# Patient Record
Sex: FEMALE | Race: ASIAN | NOT HISPANIC OR LATINO | Employment: UNEMPLOYED | URBAN - METROPOLITAN AREA
[De-identification: names, ages, dates, MRNs, and addresses within clinical notes are randomized per-mention and may not be internally consistent; named-entity substitution may affect disease eponyms.]

---

## 2017-03-20 ENCOUNTER — OFFICE VISIT (OUTPATIENT)
Dept: FAMILY MEDICINE | Facility: CLINIC | Age: 32
End: 2017-03-20

## 2017-03-20 ENCOUNTER — ALLIED HEALTH/NURSE VISIT (OUTPATIENT)
Dept: FAMILY MEDICINE | Facility: CLINIC | Age: 32
End: 2017-03-20

## 2017-03-20 VITALS
DIASTOLIC BLOOD PRESSURE: 73 MMHG | HEART RATE: 108 BPM | HEIGHT: 59 IN | SYSTOLIC BLOOD PRESSURE: 107 MMHG | BODY MASS INDEX: 28.51 KG/M2 | WEIGHT: 141.4 LBS

## 2017-03-20 DIAGNOSIS — Z72.0 TOBACCO ABUSE: ICD-10-CM

## 2017-03-20 DIAGNOSIS — Z60.3 LANGUAGE BARRIER, CULTURAL DIFFERENCES: ICD-10-CM

## 2017-03-20 DIAGNOSIS — O09.92 SUPERVISION OF HIGH RISK PREGNANCY IN SECOND TRIMESTER: ICD-10-CM

## 2017-03-20 DIAGNOSIS — N76.0 BACTERIAL VAGINOSIS: ICD-10-CM

## 2017-03-20 DIAGNOSIS — O09.893 SUPERVISION OF OTHER HIGH RISK PREGNANCIES, THIRD TRIMESTER: Primary | ICD-10-CM

## 2017-03-20 DIAGNOSIS — O09.32 LATE PRENATAL CARE AFFECTING PREGNANCY IN SECOND TRIMESTER: Primary | ICD-10-CM

## 2017-03-20 DIAGNOSIS — B96.89 BACTERIAL VAGINOSIS: ICD-10-CM

## 2017-03-20 LAB
BACTERIA: NORMAL
BILIRUBIN UR: NEGATIVE
BLOOD UR: NEGATIVE
CLUE CELLS: NORMAL
GLUCOSE URINE: NEGATIVE
HBV SURFACE AG SERPL QL IA: NEGATIVE
HEMOGLOBIN: 12.4 G/DL (ref 11.7–15.7)
HIV 1+2 AB+HIV1 P24 AG SERPL QL IA: NEGATIVE
KETONES UR QL: NEGATIVE
LEUKOCYTE ESTERASE UR: ABNORMAL
MOTILE TRICHOMONAS: NEGATIVE
NITRITE UR QL STRIP: NEGATIVE
ODOR: NORMAL
PH UR STRIP: 7.5 [PH] (ref 5–7)
PH WET PREP: NORMAL
PLATELET # BLD AUTO: 243 K/UL (ref 150–450)
PROTEIN UR: NEGATIVE
SP GR UR STRIP: 1.01
UROBILINOGEN UR STRIP-ACNC: ABNORMAL
WBC WET PREP: NORMAL
YEAST: NORMAL

## 2017-03-20 RX ORDER — PRENATAL VIT/IRON FUM/FOLIC AC 27MG-0.8MG
1 TABLET ORAL DAILY
Qty: 100 TABLET | Refills: 3 | Status: SHIPPED | OUTPATIENT
Start: 2017-03-20 | End: 2018-05-31

## 2017-03-20 RX ORDER — ACETAMINOPHEN 325 MG/1
650 TABLET ORAL EVERY 4 HOURS PRN
Qty: 100 TABLET | Refills: 0 | Status: SHIPPED | OUTPATIENT
Start: 2017-03-20 | End: 2019-05-10

## 2017-03-20 SDOH — SOCIAL STABILITY - SOCIAL INSECURITY: ACCULTURATION DIFFICULTY: Z60.3

## 2017-03-20 NOTE — MR AVS SNAPSHOT
After Visit Summary   3/20/2017    Sampson Fonseca    MRN: [id number]           Patient Information     Date Of Birth          1985        Visit Information        Provider Department      3/20/2017 2:30 PM Corine Ibarra MD [facility]        Today's Diagnoses     Supervision of other high risk pregnancies, third trimester    -  1    Tobacco abuse          Care Instructions    For pregnancy:  1. Take prenatal vitamin everyday  2. Schedule US for fetal survey and confirm dates  3. Okay to use tylenol as needed for pain    For tobacco abuse:  1. Schedule lifestyle clinic visit    Follow up with Dr. Ibarra in 2 weeks for OB check  We will do glucose screening test at your next visit        Follow-ups after your visit        Additional Services     MENTAL HEALTH REFERRAL       Your provider has referred you to: Lifestyle clinic with Dr. Brown for help quitting chewing tobacco and betelnut    All scheduling is subject to the client's specific insurance plan & benefits, provider/location availability, and provider clinical specialities.  Please arrive 15 minutes early for your first appointment and bring your completed paperwork.    Please be aware that coverage of these services is subject to the terms and limitations of your health insurance plan.  Call member services at your health plan with any benefit or coverage questions.                  Future tests that were ordered for you today     Open Future Orders        Priority Expected Expires Ordered    US OB 14 +WKS SINGLE OR FIRST GESTATION Routine  3/20/2018 3/20/2017            Who to contact     Please call your clinic at [phone] to:    Ask questions about your health    Make or cancel appointments    Discuss your medicines    Learn about your test results    Speak to your doctor   If you have compliments or concerns about an experience at your clinic, or if you wish to file a complaint, please contact [facility]  "Physicians Patient Relations at [phone] or email us at [email or web address]         Additional Information About Your Visit        eZWay Information     [employer]Way is an electronic gateway that provides easy, online access to your medical records. With eZWay, you can request a clinic appointment, read your test results, renew a prescription or communicate with your care team.     To sign up for e[employer]Way visit the website at [email or web address]   You will be asked to enter the access code listed below, as well as some personal information. Please follow the directions to create your username and password.     Your access code is: [email or web address]  Expires: 2017  3:18 PM     Your access code will  in 90 days. If you need help or a new code, please contact your [facility] or call [phone] for assistance.        Care EveryWhere ID     This is your Care EveryWhere ID. This could be used by other organizations to access your [place] medical records  [id number]        Your Vitals Were     Pulse Height Last Period BMI (Body Mass Index)          108 4' 11\" (149.9 cm) 2016 (Exact Date) 28.56 kg/m2         Blood Pressure from Last 3 Encounters:   17 107/73    Weight from Last 3 Encounters:   17 141 lb 6.4 oz (64.1 kg)              We Performed the Following     ABO/Rh Type-HML ([facility])     Antibody Screen ([facility])     Chlamydia/Gono Amplified ([facility])     Culture Urine ([facility])     GYN Cytology ([facility])     Hemoglobin (HGB) ([facility])     Hepatitis B Surface Ab ([facility])     Hepatitis B Surface Ag ([facility])     HIV Ag/Ab Screen [place] ([facility])     Lead, Blood ([facility])     MENTAL HEALTH REFERRAL     Platelets ([facility])     Rubella  IgG ([facility])     Syphilis Screen [place] ([facility])     Urinalysis([facility])     Abdelrahman Zoster Imm Status Aleena ([facility])          Today's Medication Changes          These " changes are accurate as of: 3/20/17  3:18 PM.  If you have any questions, ask your nurse or doctor.               Start taking these medicines.        Dose/Directions    acetaminophen 325 MG tablet   Commonly known as:  TYLENOL   Used for:  Supervision of other high risk pregnancies, third trimester   Started by:  Corine Ibarra MD        Dose:  650 mg   Take 2 tablets (650 mg) by mouth every 4 hours as needed for mild pain   Quantity:  100 tablet   Refills:  0       prenatal multivitamin  plus iron 27-0.8 MG Tabs per tablet   Used for:  Supervision of other high risk pregnancies, third trimester   Started by:  Corine Ibarra MD        Dose:  1 tablet   Take 1 tablet by mouth daily   Quantity:  100 tablet   Refills:  3            Where to get your medicines      These medications were sent to [facility] - [place], [place] - [address], [place] [address]     Phone:  [phone]     acetaminophen 325 MG tablet    prenatal multivitamin  plus iron 27-0.8 MG Tabs per tablet                Primary Care Provider Office Phone # Fax #    Corine Ibarra [phone]       [address] [address]        Thank you!     Thank you for choosing [facility]  for your care. Our goal is always to provide you with excellent care. Hearing back from our patients is one way we can continue to improve our services. Please take a few minutes to complete the written survey that you may receive in the mail after your visit with us. Thank you!             Your Updated Medication List - Protect others around you: Learn how to safely use, store and throw away your medicines at www.disposemymeds.org.          This list is accurate as of: 3/20/17  3:18 PM.  Always use your most recent med list.                   Brand Name Dispense Instructions for use    acetaminophen 325 MG tablet    TYLENOL    100 tablet    Take 2 tablets  (650 mg) by mouth every 4 hours as needed for mild pain       prenatal multivitamin  plus iron 27-0.8 MG Tabs per tablet     100 tablet    Take 1 tablet by mouth daily

## 2017-03-20 NOTE — PATIENT INSTRUCTIONS
For pregnancy:  1. Take prenatal vitamin everyday  2. Schedule US for fetal survey and confirm dates  3. Okay to use tylenol as needed for pain    For tobacco abuse:  1. Schedule lifestyle clinic visit    Follow up with Dr. Ibarra in 2 weeks for OB check  We will do glucose screening test at your next visit    LIFESTYLE REFERRAL:  Date:  Wednesday April 12, 2017  Time:  1:30 PM with Dr. Brown for 1 hour.    OB Ultrasound referral:  [facility]  [address] [place] [address]  [phone]  Date:  Friday March 31, 2017  Time:  1:00 PM  Please have a full bladder by drinking at least 32 ounces of water and refrain from using the bathroom 1 hour prior to appointment.   Sanjiv Bravo has been requested. ([facility])  Given to Sanjiv Shah  3/23/17    Referral Tracking: Lifestyle Clinic  Patient met with Dr. Brown on 5/26/17  Completed 5/26/17  Madeline Shah  6/7/17

## 2017-03-20 NOTE — PROGRESS NOTES
"  First Obstetric Visit       HPI       Sampson Fonseca is a 31 year old woman who presents for an initial prenatal visit at 26w1d weeks of pregnancy with DUNCAN of 2017 by LMP of Patient's last menstrual period was 2016 (exact date)..      She has not had bleeding since her LMP.   She has had mild nausea. This has improved significantly.  Weight loss has not occurred.    This was a planned pregnancy.  She had been on nexplanon, so periods were not regular. The nexplanon came out at the beginning of September of last year.    OTHER CONCERNS: No concerns.    Moved here from [place] in January. This will be her first delivery in the .  She moved here with her family 3 years ago.            Labor Risk Assessment     Is the patient's age <18 or >40?     No  Patint's BMI is Body mass index is 28.56 kg/(m^2).   Does patient have a BMI < 18.5?     No  Prior delivery within 6 months?      No  Ever delivered prior to 37 weeks gestation?  No  Pregnancy occur via In Vitro Fertilization?   No  Are you carrying twins?       No    The patient has the following additional risk factors for  labor:   none     Labor Risk Summary:  Pt is high risk for  Labor  No             Past Medical History     Past Medical History   Diagnosis Date     NO ACTIVE PROBLEMS      See nurse history note, reviewed in detail.             Current Medications      Current Outpatient Prescriptions   Medication Sig Dispense Refill     Prenatal Vit-Fe Fumarate-FA (PRENATAL MULTIVITAMIN  PLUS IRON) 27-0.8 MG TABS per tablet Take 1 tablet by mouth daily 100 tablet 3     acetaminophen (TYLENOL) 325 MG tablet Take 2 tablets (650 mg) by mouth every 4 hours as needed for mild pain 100 tablet 0           Review of Systems      12 point ROS is negative, other than mentioned above.  ====================================================           Physical Exam      /73  Pulse 108  Ht 4' 11\" (149.9 cm)  Wt 141 lb 6.4 oz (64.1 kg)  " LMP 09/18/2016 (Exact Date)  BMI 28.56 kg/m2  GENERAL: healthy, alert and no distress  EYES: Eyes grossly normal to inspection, extraocular movements - intact, and PERRL  HENT: ear canals- normal; TMs- normal; Nose- normal; Mouth- no ulcers, no lesions  NECK: no tenderness, no adenopathy, no asymmetry, no masses, no stiffness; thyroid- normal to palpation  RESP: lungs clear to auscultation - no rales, no rhonchi, no wheezes  BREAST: no masses, no tenderness, no nipple discharge, no palpable axillary masses or adenopathy  CV: regular rates and rhythm, normal S1 S2, no S3 or S4 and no murmur, no click or rub -  ABDOMEN: soft, no tenderness, no  hepatosplenomegaly, no masses, normal bowel sounds. , Fundal height 26.  MS: extremities- no gross deformities noted, no edema  SKIN: no suspicious lesions, no rashes  GENITALIA:  BUS WNL, no lesions noted   VAGINA:  Pink, normal rugae and discharge white  CERVIX:  smooth, without discharge or CMT and parous os,   firm/ closed  UTERUS: Anteverted, nontender 26 weeks in size.  ADNEXA: Without masses or tenderness    Past labs reviewed:  None available      =========================================         Assessment and Plan     Diagnoses and all orders for this visit:    Supervision of other high risk pregnancies, third trimester.  New OB.  Recently moved here from [place], patient has not had prenatal care yet this pregnancy.  Based on her LMP she is approximately 26 weeks and 1 day.  Her fundal height is consistent with this date.  She has not had any ultrasounds since being pregnant.  We'll plan to obtain fetal survey as well as confirm dates as soon as possible.  She will be due for her glucose challenge test at follow-up visit.  Labs obtained today.  Pap smear performed today.  -     Lead, Blood ([facility])  -     Platelets ([facility])  -     Prenatal Vit-Fe Fumarate-FA (PRENATAL MULTIVITAMIN  PLUS IRON) 27-0.8 MG TABS per tablet; Take 1 tablet by mouth daily  -      acetaminophen (TYLENOL) 325 MG tablet; Take 2 tablets (650 mg) by mouth every 4 hours as needed for mild pain  -     ABO/Rh Type-HML ([facility])  -     Antibody Screen ([facility])  -     Urinalysis([facility])  -     Hemoglobin (HGB) ([facility])  -     Hepatitis B Surface Ag ([facility])  -     Syphilis Screen [place] ([facility])  -     HIV Ag/Ab Screen [place] ([facility])  -     Culture Urine ([facility])  -     Rubella  IgG ([facility])  -     Cancel: Chlamydia/Gono Amplified ([facility])  -     Hepatitis B Surface Ab ([facility])  -     Abdelrahman Zoster Imm Status Aleena ([facility])  -     US OB 14 +WKS SINGLE OR FIRST GESTATION; Future  -     GYN Cytology ([facility])  -     Wet Prep ([facility])  -     Chlamydia/Gono Amplified ([facility])  -     Glucose Challenge  1 Hr  ([facility]); Future    Tobacco abuse  -     MENTAL HEALTH REFERRAL ([facility])    Bacterial vaginosis. Wet prep with clue cells   - 1 week PO metronidazole sent to pharmacy, will have  call patient.    31 year old  , 26w1d weeks of pregnancy with DUNCAN of 2017 by LMP of Patient's last menstrual period was 2016 (exact date)..      Pregnancy Risk Assessment: High Risk pregnancy  Discussed high risk conditions as follows: Late prenatal care, non-English speaking    -Dating US obtained and dating confirmed?   NO (Recommended)  -Taking PNV/Folate?     NO (Recommended)      - Ordered new OB labs: blood type and antibody screen, HIV, VDRL, hep B, rubella, UA/UC, gonorrhea/chlamydia, Pap smear done today.  - Discussed genetic screening. Patient is not in correct [place] for this.  - The patient does not h/o severe, early preeclampsia with delivery <34weeks, preeclampsia in more than one pregnancy, pre-gestational diabetes, chronic hypertension, renal disease, or autoimmune disease so WILL NOT start low dose aspirin (81mg) and calcium supplementation (1g-1.5g/day) to prevent preeclampsia.  - Prenatal vitamins ordered.    Counseling given:    - Follow up in 2 weeks for return OB visit.  - Recommended weight gain for pregnancy: 25-35 lbs.     - Instructed on best evidence for: healthy diet and foods to avoid; exercise and activity during pregnancy; avoiding exposure to toxoplasmosis; safe use of seatbelts during pregnancy; and maintenance of a generally healthy lifestyle  -Patient to see OB educator/ RN today and/or next visit.    Discussed the harms, benefits, side effects and alternative therapies for current prescribed and OTC medications.       Options for treatment and follow-up care were reviewed with the patient and/or guardian. Sampson D Mo and/or guardian engaged in the decision making process and verbalized understanding of the options discussed and agreed with the final plan.    Staffed with Dr. Soliman.    Corine Ibarra MD ([place]) PGY-2  [facility]  3/21/2017

## 2017-03-20 NOTE — PROGRESS NOTES
Past Medical History     Do you have a history of any of the following medical conditions?    Condition No/Yes/Details   Hypertension No    Heart disease, mitral valve prolapse, rheumatic fever No    Asthma or another chronic lung disease No    An autoimmune disorder No    Kidney disease No    Frequent urinary tract infections No    Epilepsy, seizures, or spells No    Migraine headaches No    Stroke, loss of sensation/function, seizures, or other neuro problem No    Diabetes No    Thyroid problems or have you taken thyroid medication No    Hepatitis, liver disease, jaundice No    Blood clots, phlebitis, pulmonary embolism or varicose veins No    Excessive bleeding after surgery or dental work No    Do you have more bleeding than other women after a cut or a scratch? No    Anemia No    Blood transfusions No         Would you refuse a blood transfusion?       No   If yes, then ask next question. If no, skip next question.   Would you rather die than receive a blood transfusion? No    Breast problems No    Have you ever ?  YES plans to breastfeed   Abnormalities of the uterus No    Abnormal pap smear No    Have you ever been treated for depression? No    Are you having problems with crying spells or loss of self-esteem? No    Have you ever required psychiatric care? No    Have you been physically, sexually, or emotionally hurt by someone? No    Have you been in a major accident or suffered serious trauma? No    Have you ever had any gynecological surgical procedures such as cervical conization, LEEP, laser treatment, cryosurgery of the cervix, or a dilatation and curettage? No    Have you had any other surgical procedures? No         Have you ever had any complications from anesthesia? No    Have you ever been hospitalized for a nonsurgical reason? No             Substance use and exposure     Does anyone in your home smoke? No    Do you use tobacco products or betel nut?  YES chews betel nut and  tobacco up to 10 times per day and interested in quitting   Do you drink beer, wine, or hard liquor? No    Do you use any of the following: marijuana, speed, cocaine, heroin, hallucinogens, or other drugs? No               Symptoms since Last Menstrual Period     Do you currently have any of the following symptoms: No/Yes/Details        Abdominal pain No         Blood in the stools or urine No         Chest pain No         Shortness of breath No         coughing or vomiting up blood No         Your heart racing or skipping beats No         Nausea or vomiting  YES once a month feels like heart is beating faster on and off for 1 day.  Drinks lemon juice and it goes away for the last year.        Pain on urination  YES burning with urination 2 x's per month in the last 2 months         Vaginal discharge or bleeding No                Genetic Screening          Is the patient 35 years or older? No      Do you have a history of any of the following No/Yes/Details        A metabolic disorder (e.g. Insulin-dependent DM, PKU) No         Recurrent pregnancy loss or still birth No      Do you, the baby's father, or anyone in your families have          Thalassemia AND MCV <80 No         Hemophilia No         Neural tube defect No }        Congenital heart defect No         Sickle cell disease or trait No         Muscular dystrophy No         Cystic fibrosis No         Mental retardation or autism No         Down's syndrome No         Ladarius-Sach's disease No         Oakley's chorea No         Any other inherited genetic or chromosomal disorder No         A child with birth defects not listed above No                Infection History     Ever treated for tuberculosis or had a positive skin test No    Ever had genital herpes (or has your partner) No    Had a rash or viral illness since LMP No    Ever had a sexually transmitted infection No    Ever had chicken pox or the vaccine  YES had chicken pox as a child   Have you  had a sexual partner who is HIV positive No    Ever had any other serious infectious disease No                Risk Assessment     Average Risk Category  No significant risk factors: Yes    At Risk Category (up to 3)  Teen pregnancy: No  Poor social situation: No  Domestic abuse: No  Financial difficulties: No  Smoker: Yes- chewing tobacco and betel nut  H/O  deliver: No  H/O drug abuse: No  Non-English speaking: Yes  Advanced maternal age: No  GDM risks: No  Previous C/S: No  H/O PIH: No  H/O STIs: No  H/O mental health concerns: No  Onset care > 20 weeks: Yes      High Risk Category (4 or more At Risk or)  Diabetes/GDM: No  Multiple gestation: No  Chronic hypertension: No  Significant hx of asthma: No  Fetal demise > 20 weeks: No  Positive tox screen: No  Current mental health treatment: No      Risk: High Risk   Date determined: 3/20/17

## 2017-03-20 NOTE — MR AVS SNAPSHOT
After Visit Summary   3/20/2017    Sampson Fonseca    MRN: [id number]           Patient Information     Date Of Birth          1985        Visit Information        Provider Department      3/20/2017 1:40 PM Educator, Brown [facility]        Today's Diagnoses     Late prenatal care affecting pregnancy in second trimester    -  1    Supervision of high risk pregnancy in second trimester        Language barrier, cultural differences           Follow-ups after your visit        Your next 10 appointments already scheduled     Apr 06, 2017  1:30 PM CDT   RETURN OB with Corine Ibarra MD   [facility] ([facility])    [address] [place] [address]   [phone]            Apr 12, 2017  1:30 PM CDT   Return Lifestyle with Marsha Brown PhD   [facility] ([facility])    [address] [place] [address]   [phone]            Apr 26, 2017  1:30 PM CDT   Return Visit with Marsha Brown, PhD   [facility] ([facility])    [address] [place] [address]   [phone]              Future tests that were ordered for you today     Open Future Orders        Priority Expected Expires Ordered    Glucose Challenge  1 Hr  ([facility]) Routine 4/3/2017 6/20/2017 3/20/2017     OB 14 +WKS SINGLE OR FIRST GESTATION Routine  3/20/2018 3/20/2017            Who to contact     Please call your clinic at [phone] to:    Ask questions about your health    Make or cancel appointments    Discuss your medicines    Learn about your test results    Speak to your doctor   If you have compliments or concerns about an experience at your clinic, or if you wish to file a complaint, please contact [facility] Patient Relations at [phone] or email us at [email or web address]         Additional Information About Your Visit        MyChart Information     Rylat is an electronic gateway that provides easy, online access to your medical  records. With Nettle, you can request a clinic appointment, read your test results, renew a prescription or communicate with your care team.     To sign up for Nettle visit the website at [email or web address]   You will be asked to enter the access code listed below, as well as some personal information. Please follow the directions to create your username and password.     Your access code is: [email or web address]  Expires: 2017  3:18 PM     Your access code will  in 90 days. If you need help or a new code, please contact your [facility] or call [phone] for assistance.        Care EveryWhere ID     This is your Care EveryWhere ID. This could be used by other organizations to access your [facility] medical records  [id number]        Your Vitals Were     Last Period                   2016 (Exact Date)            Blood Pressure from Last 3 Encounters:   17 107/73    Weight from Last 3 Encounters:   17 141 lb 6.4 oz (64.1 kg)              We Performed the Following     AT RISK ANTEPARTUM MANAGEMENT     CARE COORDINATION          Today's Medication Changes          These changes are accurate as of: 3/20/17 11:59 PM.  If you have any questions, ask your nurse or doctor.               Start taking these medicines.        Dose/Directions    acetaminophen 325 MG tablet   Commonly known as:  TYLENOL   Used for:  Supervision of other high risk pregnancies, third trimester   Started by:  Corine Ibarra MD        Dose:  650 mg   Take 2 tablets (650 mg) by mouth every 4 hours as needed for mild pain   Quantity:  100 tablet   Refills:  0       metroNIDAZOLE 500 MG tablet   Commonly known as:  FLAGYL   Used for:  Bacterial vaginosis   Started by:  Corine Ibarra MD        Dose:  500 mg   Take 1 tablet (500 mg) by mouth 2 times daily   Quantity:  14 tablet   Refills:  0       prenatal multivitamin  plus iron 27-0.8 MG Tabs per tablet   Used  for:  Supervision of other high risk pregnancies, third trimester   Started by:  Corine Ibarra MD        Dose:  1 tablet   Take 1 tablet by mouth daily   Quantity:  100 tablet   Refills:  3            Where to get your medicines      These medications were sent to [facility] - [place], [place] - [address], [place] [address]     Phone:  [phone]     acetaminophen 325 MG tablet    metroNIDAZOLE 500 MG tablet    prenatal multivitamin  plus iron 27-0.8 MG Tabs per tablet                Primary Care Provider Office Phone # Fax #    Corine Ibarra [phone]       [facility] [address] [facility] [address]        Thank you!     Thank you for choosing [facility]  for your care. Our goal is always to provide you with excellent care. Hearing back from our patients is one way we can continue to improve our services. Please take a few minutes to complete the written survey that you may receive in the mail after your visit with us. Thank you!             Your Updated Medication List - Protect others around you: Learn how to safely use, store and throw away your medicines at www.disposemymeds.org.          This list is accurate as of: 3/20/17 11:59 PM.  Always use your most recent med list.                   Brand Name Dispense Instructions for use    acetaminophen 325 MG tablet    TYLENOL    100 tablet    Take 2 tablets (650 mg) by mouth every 4 hours as needed for mild pain       metroNIDAZOLE 500 MG tablet    FLAGYL    14 tablet    Take 1 tablet (500 mg) by mouth 2 times daily       prenatal multivitamin  plus iron 27-0.8 MG Tabs per tablet     100 tablet    Take 1 tablet by mouth daily

## 2017-03-21 PROBLEM — Z60.3 LANGUAGE BARRIER, CULTURAL DIFFERENCES: Status: ACTIVE | Noted: 2017-03-20

## 2017-03-21 PROBLEM — O09.32 LATE PRENATAL CARE AFFECTING PREGNANCY IN SECOND TRIMESTER: Status: ACTIVE | Noted: 2017-03-20

## 2017-03-21 PROBLEM — O09.92 SUPERVISION OF HIGH RISK PREGNANCY IN SECOND TRIMESTER: Status: ACTIVE | Noted: 2017-03-20

## 2017-03-21 LAB
ABO + RH BLD: NORMAL
BLD GP AB SCN SERPL QL: NEGATIVE
C TRACH RRNA SPEC QL NAA+PROBE: NEGATIVE
CULTURE: NORMAL
HBV SURFACE AB SER-ACNC: NEGATIVE M[IU]/ML
N GONORRHOEA RRNA SPEC QL NAA+PROBE: NEGATIVE
REPEAT ABO/RH TYPING (HML): NORMAL
RPR SER QL: NORMAL
RUBV IGG SERPL QL IA: NORMAL

## 2017-03-21 RX ORDER — METRONIDAZOLE 500 MG/1
500 TABLET ORAL 2 TIMES DAILY
Qty: 14 TABLET | Refills: 0 | Status: SHIPPED | OUTPATIENT
Start: 2017-03-21 | End: 2017-05-01

## 2017-03-21 NOTE — NURSING NOTE
Family Medicine OB Education    I provided the following OB education to Sampson Fonseca.    Discussed that Dr Ibarra should be the doctor that she sees for her prenatal visits and that Dr Ibarra will try hard to be the one to deliver her baby.  Discussed that if Dr Ibarra was unavailable that one of our other physicians would deliver the baby and that this could be a male or female provider.  Briefly discussed residency program and that multiple doctors would be present at time of delivery.  Sheet given and discussed fetal growth and development.  Sheet given and discussed warning signs with reasons to call clinic, L&D, or 24 hr HE  line with questions or concerns (phone numbers given).  Sheet given and discussed Tdap to be given after 27 weeks gestation and the importance of family members get immunized before delivery.  Proof of pregnancy completed and given to pt.  Gave pt preregistration form for [facility] to complete.  See questionnaire and pregnancy risk assessment for further information in provider encounter.       Name of provider who requested the OB education: Dr Ibarra  Name of provider on site at the time of performing the OB education: Dr Janny Mccollum, RN BSN

## 2017-03-21 NOTE — PROGRESS NOTES
Preceptor attestation:  Patient seen and discussed with the resident. Assessment and plan reviewed with resident and agreed upon.  Supervising physician: Gregg Soliman  [facility]

## 2017-03-22 PROBLEM — Z23 NEED FOR VARICELLA VACCINE: Status: ACTIVE | Noted: 2017-03-20

## 2017-03-22 LAB
COLLECTION METHOD: NORMAL
LEAD BLD-MCNC: <1.9 UG/DL
VZV IGG SER QL IF: ABNORMAL

## 2017-03-24 ENCOUNTER — RECORDS - HEALTHEAST (OUTPATIENT)
Dept: ADMINISTRATIVE | Facility: OTHER | Age: 32
End: 2017-03-24

## 2017-03-24 LAB
HIGH RISK?: NO
HPV REFLEX?: NORMAL
INTERPRETATION: NORMAL
INTERPRETER: NORMAL
LAB AP ABNORMAL BLEEDING: NO
LAB AP BIRTH CONTROL/HORMONES: NORMAL
LAB AP CASE REPORT: NORMAL
LAB AP CERVICAL APPEARANCE: NORMAL
LAB AP GYN INTERPRETATION: NORMAL
LAB AP MALIGNANT?: NORMAL
LAB AP PATIENT STATUS: NORMAL
LAB AP PREVIOUS ABNL: NORMAL
LAB AP PREVIOUS NORMAL: NORMAL
LAST MENS PERIOD: NORMAL
SPECIMEN ADEQUACY:: NORMAL

## 2017-04-03 DIAGNOSIS — O09.893 SUPERVISION OF OTHER HIGH RISK PREGNANCIES, THIRD TRIMESTER: ICD-10-CM

## 2017-04-06 ENCOUNTER — OFFICE VISIT (OUTPATIENT)
Dept: FAMILY MEDICINE | Facility: CLINIC | Age: 32
End: 2017-04-06

## 2017-04-06 VITALS
HEIGHT: 59 IN | OXYGEN SATURATION: 100 % | BODY MASS INDEX: 29.43 KG/M2 | SYSTOLIC BLOOD PRESSURE: 107 MMHG | DIASTOLIC BLOOD PRESSURE: 70 MMHG | TEMPERATURE: 98.6 F | WEIGHT: 146 LBS | HEART RATE: 100 BPM

## 2017-04-06 DIAGNOSIS — L98.9 SCALP LESION: ICD-10-CM

## 2017-04-06 DIAGNOSIS — O09.893 SUPERVISION OF OTHER HIGH RISK PREGNANCIES, THIRD TRIMESTER: Primary | ICD-10-CM

## 2017-04-06 DIAGNOSIS — M54.50 PREGNANCY WITH LOW BACK PAIN IN THIRD TRIMESTER, ANTEPARTUM: ICD-10-CM

## 2017-04-06 DIAGNOSIS — O26.893 PREGNANCY WITH LOW BACK PAIN IN THIRD TRIMESTER, ANTEPARTUM: ICD-10-CM

## 2017-04-06 DIAGNOSIS — R30.0 DYSURIA: ICD-10-CM

## 2017-04-06 DIAGNOSIS — Z23 NEED FOR VACCINATION: ICD-10-CM

## 2017-04-06 LAB
BILIRUBIN UR: NEGATIVE
BLOOD UR: NEGATIVE
GLU GEST SCREEN 1HR 50G: 101 (ref 0–129)
GLUCOSE URINE: NEGATIVE
KETONES UR QL: NEGATIVE
LEUKOCYTE ESTERASE UR: NEGATIVE
NITRITE UR QL STRIP: NEGATIVE
PH UR STRIP: 6.5 [PH] (ref 5–7)
PROTEIN UR: NEGATIVE
SP GR UR STRIP: 1.01
UROBILINOGEN UR STRIP-ACNC: NORMAL

## 2017-04-06 NOTE — PATIENT INSTRUCTIONS
For bumps for head:  1. Use warm packs or warm rags to help with pain and possible drainage    For pain:  1. Use tylenol 2 tablets as needed for pain  2. Use warm or cold packs to help with pain  3. We will check to make sure you don't have a urinary tract infection    Follow up with Dr. Ibarra in 2 weeks

## 2017-04-06 NOTE — MR AVS SNAPSHOT
After Visit Summary   4/6/2017    Sampson Fonseca    MRN: [id number]           Patient Information     Date Of Birth          1985        Visit Information        Provider Department      4/6/2017 1:30 PM Corine Ibarra MD [facility]        Today's Diagnoses     Supervision of other high risk pregnancies, third trimester    -  1    Dysuria          Care Instructions    For bumps for head:  1. Use warm packs or warm rags to help with pain and possible drainage    For pain:  1. Use tylenol 2 tablets as needed for pain  2. Use warm or cold packs to help with pain  3. We will check to make sure you don't have a urinary tract infection    Follow up with Dr. Ibarra in 2 weeks        Follow-ups after your visit        Your next 10 appointments already scheduled     Apr 12, 2017  1:30 PM CDT   Return Lifestyle with Marsha Brown PhD   [facility] ([facility])    [address] [place] [id number] [phone]            Apr 26, 2017  1:30 PM CDT   Return Visit with Marsha Brown PhD   [facility] ([facility])    [address] [place] [address]   [phone]              Who to contact     Please call your clinic at [phone] to:    Ask questions about your health    Make or cancel appointments    Discuss your medicines    Learn about your test results    Speak to your doctor   If you have compliments or concerns about an experience at your clinic, or if you wish to file a complaint, please contact [facility] Patient Relations at [phone] or email us at [email or web address]         Additional Information About Your Visit        Socialarehart Information     HigherNext is an electronic gateway that provides easy, online access to your medical records. With HigherNext, you can request a clinic appointment, read your test results, renew a prescription or communicate with your care team.     To sign up for HigherNext visit the website at  "[email or web address]   You will be asked to enter the access code listed below, as well as some personal information. Please follow the directions to create your username and password.     Your access code is: [email or web address]  Expires: 2017  3:18 PM     Your access code will  in 90 days. If you need help or a new code, please contact your [facility] or call [phone] for assistance.        Care EveryWhere ID     This is your Care EveryWhere ID. This could be used by other organizations to access your [facility] medical records  [id number]        Your Vitals Were     Pulse Temperature Height Last Period Pulse Oximetry BMI (Body Mass Index)    100 98.6  F (37  C) (Oral) 4' 10.75\" (149.2 cm) 2016 (Exact Date) 100% 29.74 kg/m2       Blood Pressure from Last 3 Encounters:   17 107/70   17 107/73    Weight from Last 3 Encounters:   17 146 lb (66.2 kg)   17 141 lb 6.4 oz (64.1 kg)              We Performed the Following     Urinalysis([facility])     Urine Culture ([facility])        Primary Care Provider Office Phone # Fax #    Corine Alivia Ibarra [phone]       [address] [address]        Thank you!     Thank you for choosing [facility]  for your care. Our goal is always to provide you with excellent care. Hearing back from our patients is one way we can continue to improve our services. Please take a few minutes to complete the written survey that you may receive in the mail after your visit with us. Thank you!             Your Updated Medication List - Protect others around you: Learn how to safely use, store and throw away your medicines at www.disposemymeds.org.          This list is accurate as of: 17  2:11 PM.  Always use your most recent med list.                   Brand Name Dispense Instructions for use    acetaminophen 325 MG tablet    TYLENOL    100 tablet    Take 2 " tablets (650 mg) by mouth every 4 hours as needed for mild pain       metroNIDAZOLE 500 MG tablet    FLAGYL    14 tablet    Take 1 tablet (500 mg) by mouth 2 times daily       prenatal multivitamin  plus iron 27-0.8 MG Tabs per tablet     100 tablet    Take 1 tablet by mouth daily

## 2017-04-06 NOTE — PROGRESS NOTES
"Subjective  Concerns: She states that things are going well.    About 4-5 days ago, she started having pain in the right side of her low back.  Pain is worse at night, when laying down.  When she is awake she does not have any problems.  Tylenol has not been helpful.  She has not tried warm or ice packs.      She does endorse dysuria.  This started 2 days ago.  She denies any blood in the urine.  Denies fevers or chills.  She has had UTIs in the past, last .      She has a bump on her scalp, and she has had this in the past.  She had to have these drained in the past.  There has not been drainage from this recently.    Headache absent Changes in vision absent Chest pain  absent Dyspnea absent Nausea absent without vomiting. Abdominal pain absent. Contractions absent  Dysuria present, see above. Vaginal Discharge absent Vaginal bleeding absent.  Loss of Fluid absent   Extremity swelling absent.   Fetal movement present.    STD risk screening   Have you used alcohol or illicit drugs?No    Do you have multiple sex partners? No    Have you had a sexually transmitted disease?No    Have you had syphilis?No    Have you had a sexual partner who is HIV positive?No    Have you travelled during the pregnancy?No  Have your sexual partner(s) travelled during the pregnancy?No      Patient Active Problem List   Diagnosis     Supervision of high risk pregnancy in second trimester     Late prenatal care affecting pregnancy in second trimester     Language barrier, cultural differences     Need for varicella vaccine       Sampson Fonseca speaks Omani so an  was used today.    Guidance:  seatbelt use  fetal growth and movement  signs of  labor    Objective  /70  Pulse 100  Temp 98.6  F (37  C) (Oral)  Ht 4' 10.75\" (149.2 cm)  Wt 146 lb (66.2 kg)  LMP 2016 (Exact Date)  SpO2 100%  BMI 29.74 kg/m2  No distress.  Gravid abdomen.  .  Fundal height 28 cm.  no edema.  Scalp reveals tender, 0.5 cm " firm nodule overlying the right parietal bone, near sagittal suture; no evidence of infection--erythema, warmth, drainage.  There is tenderness over right paraspinal muscles and pain over the greater trochanter.  Mild right CVA tenderness.    Results  Blood type: B POS  Results for orders placed or performed in visit on 03/20/17   Lead, Blood ([facility])   Result Value Ref Range    Lead <1.9 <5.0 ug/dL    Collection Method Venous     Narrative    Test performed by:  [facility] [address] [place] [address]   Platelets ([facility])   Result Value Ref Range    Platelets 243.0 150.0 - 450.0 K/uL   ABO/Rh Type-HML ([facility])   Result Value Ref Range    ABO/Rh(D) B POS     Repeat ABO/Rh Typing ([facility]) B POS     Narrative    Test performed by:  [facility] [address], [place] [address]   Antibody Screen ([facility])   Result Value Ref Range    Antibody Screen Negative Negative    Narrative    Test performed by:  [facility] [address], [place] [address]   Urinalysis([facility])   Result Value Ref Range    Specific Gravity Urine 1.015 1.005 - 1.030    pH Urine 7.5 4.5 - 8.0    Leukocyte Esterase UR Trace (A) NEGATIVE    Nitrite Urine Negative NEGATIVE    Protein UR Negative NEGATIVE    Glucose Urine Negative NEGATIVE    Ketones Urine Negative NEGATIVE    Urobilinogen mg/dL 0.2 E.U./dL 0.2 E.U./dL    Bilirubin UR Negative NEGATIVE    Blood UR Negative NEGATIVE   Hemoglobin (HGB) ([facility])   Result Value Ref Range    Hemoglobin 12.4 11.7 - 15.7 g/dL   Hepatitis B Surface Ag ([facility])   Result Value Ref Range    Hepatitis B Surface Antigen Negative Negative    Narrative    Test performed by:  [facility] [address] [place] [address]   Syphilis Screen Bison ([facility])   Result Value Ref Range    Syphilis Screen Cascade Non-Reactive Non-Reactive    Narrative    Test performed by:  [facility] [address] [place] [address]   HIV Ag/Ab Screen [place]  ([facility])   Result Value Ref Range    HIV Antigen/Antibody Negative Negative    Narrative    Test performed by:  [facility] [address] [place] [address]   Culture Urine ([facility])   Result Value Ref Range    Culture SEE RESULTS BELOW     Narrative    Test performed by:  [facility] [address] [place] [address]   Rubella  IgG ([facility])   Result Value Ref Range    Rubella IgG Immune Immune    Narrative    Test performed by:  [facility] [address] [place] [address]   Hepatitis B Surface Ab ([facility])   Result Value Ref Range    Hepatitis B Surface Antibody Negative Negative    Narrative    Test performed by:  [facility] [address] [place] [address]   Abdelrahman Zoster Imm Status Aleena ([facility])   Result Value Ref Range    V.zoster Immune Status Equivocal (A) Immune    Narrative    Test performed by:  [facility] [address] [place] [address]   GYN Cytology ([facility])   Result Value Ref Range    Lab AP Case Report SEE RESULTS BELOW     Lab AP Gyn Interpretation SEE RESULTS BELOW     Lab AP Malignant? Normal Normal    Specimen adequacy:       Satisfactory for evaluation, endocervical/transformation zone component present    HPV Reflex? Yes regardless of result     High Risk? No     Last Mens Period 9/2016     Lab AP Abnormal Bleeding No     Lab AP Patient Status pregnant     Lab AP Birth Control/Hormones None     Lab AP Previous Normal unsure     Lab AP Previous Abnl never     Lab AP Cervical Appearance normal     Narrative    Test performed by:  [facility] [address] [place] [address]   Wet Prep ([facility])   Result Value Ref Range    Yeast Wet Prep None none    Motile Trichomonas Wet Prep Negative Negative    Clue Cells Wet Prep Present <20% NONE    WBC WET PREP 5-10 2 - 5    Bacteria Wet Prep Many None    pH Wet Prep Not performed 3.8 - 4.5    Odor Wet Prep None NONE    Chlam[facility]Gono [facility]ified [facility]   Result Value Ref Range    Chlamydia trac,Amplified Prb Negative Negative    N gonorrhoeae,Amplified Prb Negative Negative    Narrative    Test performed by:  [facility] [address] [place] [address]   HPV Bledsoe PCR ([facility])   Result Value Ref Range    Interpretation No HPV Type(s) Detected No HPV Type(s) Detected, No High Risk HP     Deniz Matos MD, [facility]     Narrative    Test performed by:  [facility] [address] [place] [address]  No HPV Type(s) Detected  Interpretation: The sample is negative for the presence of DNA from one or   more of the following high risk HPV types (16, 18, 31, 33, 35, 39, 45, 51, 52,   56, 58, 66, and 68) and/or probable high or low risk HPV types (2a, 6, 11,   26, 30, 32, 34, 42, 43, 44, 53, 54, 55, 57, 61, 62, 67, 69, 70, 71, 72, 73,   74, 77, 81, 82, 83, 84, 85, 87, 89).  High risk types are classified by the   IARC as carcinogenicity, probably, or possible carcinogenic to humans.    According to the IARC, low risk types are not classifiable as to their   carcingenicity to humans. These results do not exclude the possibility of   additional low or high risk HPV types not detected due to adequacy and   representativeness of sampling or assay sensitivity.  Comments: The absence of low and or high risk HPV types reduces the collective   risk for the development of cervical dysplasia or neoplasia.  This result, in   association with the morphology assessment of the Pap sample are vallejo   information for the determination of follow-up testing and in the clinical   management of the patient.  Methodology:  Genomic DNA was extracted from the submitted specimen and   amplified by the polymerase chain reaction (PCR) using consensus   oligonucleotide primers for the L1 region of the human papillomavirus (HPV)   genome.  Concurrently, the integrity of the extracted DNA was evaluated  by the   amplification of beta-globin, a common housekeeping gene.  Result   interpretation is performed by analysis of peak height and size data generated   through fluorescence detection by automated electrophoresis.  HPV DNA   positive PCR products were subjected to digestion by the restriction   endonucleases Hae III, Pst 1, and Rsa 1.  Digested DNA fragments were    by automated electrophoresis.  Digital electropherograms and gel   images of data were generated and the specific HPV type was determined by   matching the restriction fragment patterns of the respective specimens to that   of known HPV restriction fragment patterns.  The analytical and performance   characteristics of this laboratory-developed test (LDT) were determined by   [facility] pursuant to Clinical Laboratory Improvement Amendments (CLIA 88)   requirements.  It has not been cleared or approved by the U.S. Food and Drug   Administration (FDA).  The FDA has determined that such clearance or approval   is not a requirement prior to use for clinical purposes.       Assessment & Plan  31 year old  at 28w4d with DUNCAN 2017 based on LMP and second trimester US.    Sampson was seen today for prenatal care and other.    Diagnoses and all orders for this visit:    Supervision of other high risk pregnancies, third trimester  Patient is doing well today, has a few concerns (see below).  BP normal, FHT reassuring, and size consistent with dates. Due for 1 hour glucose test today, and it is normal.  Non-immune to hep B and varicella.  Will start Hep B series today, plan for varicella post partum.  Also due for Tdap today.  US showed normal survey, except unable to visualize posterior fossa.  Will need repeat at the end of the month  -     Glucose Challenge  1 Hr  ([facility])  - Tdap  - Hep B  - Follow up fetal survey for posterior fossa in 3 weeks.    Pregnancy with low back pain in third trimester, antepartum  Patient complaining of low  back pain at night when she sleeps, not alleviated by tylenol.  Exam consistent with MSK pain.   - Tylenol   - Warm packs and ice packs.    Dysuria  Burning with urination for 2 days.  UA negative, but will await urine culture.  -     Urinalysis([facility])  -     Urine Culture ([facility])    Scalp lesion  Solitary scalp nodule on right upper parietal area with no evidence of infection.   - Warm packs    Need for vaccination  -     ADMIN VACCINE, INITIAL  -     ADMIN VACCINE, EACH ADDITIONAL  -     HEPATITIS B VACCINE,ADULT,IM  -     TDAP VACCINE ([facility])      Return to clinic in 2 weeks.    Corine Ibarra PGY-2  I precepted today with Mabel James MD.

## 2017-04-07 LAB — CULTURE: NORMAL

## 2017-04-07 NOTE — PROGRESS NOTES
"Preceptor attestation:  Vital signs reviewed: /70  Pulse 100  Temp 98.6  F (37  C) (Oral)  Ht 4' 10.75\" (149.2 cm)  Wt 146 lb (66.2 kg)  LMP 09/18/2016 (Exact Date)  SpO2 100%  BMI 29.74 kg/m2    Patient seen and discussed with the resident. Assessment and plan reviewed with resident and agreed upon.    Supervising physician: Mabel James MD  [facility]  "

## 2017-04-12 ENCOUNTER — OFFICE VISIT (OUTPATIENT)
Dept: PSYCHOLOGY | Facility: CLINIC | Age: 32
End: 2017-04-12

## 2017-04-12 DIAGNOSIS — Z72.0 TOBACCO ABUSE: Primary | ICD-10-CM

## 2017-04-12 PROBLEM — O99.330 TOBACCO USE IN PREGNANCY: Status: ACTIVE | Noted: 2017-04-12

## 2017-04-12 NOTE — Clinical Note
Dr. Ibarra and Desiree CHEN. She's reducing to 4x/day for now, with ultimate goal of totally stopping.

## 2017-04-12 NOTE — PATIENT INSTRUCTIONS
- Use betel nut+tobacco 4 times per day,  Use gum instead on other two times.    - Follow-up with Dr. Brown in 2-3 weeks.

## 2017-04-12 NOTE — PROGRESS NOTES
Health & Behavior Assessment    Visit type: initial  Length of visit:  35  Others present:  (PASQUALE Branch, Vietnamese)    Subjective: Sampson Fonseca is a 31 year old, Vietnamese female who was referred for behavioral health assessment and treatment by Dr. Ibarra to help with the psychosocial aspects of reducing tobacco use during pregnancy.    Patient Goals: Quitting tobacco+betel nut through reducing gradually over the first couple weeks. She would like to stop use completely.    Motivations: Wants to quit for the health of her baby. Has heard that tobacco use during pregnancy can contribute to reduced birth weight.    History of problem/perception of problem: Has been using betel nut+ tobacco 6 times per day recently. She has used for the past 3-4 years. Initially she would only use 1-2 times per day, and use increased gradually over time. Triggers for wanting to use more includes: finishing meals and spending time with friends.     Previous strategies used for change: Has never tried to quit.      Barriers to change: Rates confidence as 3/3. Cannot think of any barriers. Her  and father (who lives nearby) both use tobacco+betel nut.    Strengths: High reported motivation and confidence. Supportive family. Able to generate ideas for change (gradually reducing, substituting with gum).    Medical conditions:   Patient Active Problem List   Diagnosis     Supervision of high risk pregnancy in second trimester     Late prenatal care affecting pregnancy in second trimester     Language barrier, cultural differences     Need for varicella vaccine     Tobacco abuse     Psychiatric conditions: Denied having any current or past anxiety, depression, or other mental health problems.    Substance use history: Does not use alcohol or other drugs.    Sleep: Sleep is typically good, but worsens during pregnancy typically, as it has in this pregnancy as well. She sleeps about 7-8 hours per night, but has trouble staying  "asleep and wakes throughout the night.     Other relevant history: Lives with her  and their 3 children.     Objective: Ms. Fonseca is awake and alert for today's visit.        Importance level: 3/3   Confidence level: 3/3      Assessment: Ms. Fonseca is a Afghan female who was [facility] to [facility] for assistance with quitting tobacco use. She appeared to be active and engaged in today's meet and appeared receptive to goal-setting and feedback. Mood was reportedly \"good\" and affect was euthymic. Insight and judgement appears to be good. She has no past experience trying to quit tobacco, so provided education about urges and cravings and how to manage these. She had reasonable and creative ideas for change, which I support. Made a concrete plan for change and plan to meet again in about 2 weeks to continue reducing use.     Stage of change: PREPARATION (Decided to change - considering how)   Diagnosis: Tobacco Abuse    Plan:  1.  Described integrated care team and shared chart with primary care provider.  2.  Made the plan for Sampson to reduce tobacco+betel nut use gradually. She would like to start by reducing to only 4 times/day for the next 2 weeks. Will replace with gum.   3. Discussed distracting her mind from urges or cravings, as they are temporary. Also described picturing the craving as a wave, and she can imagine surfing over that wave.  "

## 2017-04-12 NOTE — MR AVS SNAPSHOT
After Visit Summary   4/12/2017    Sampson Fonseca    MRN: [id number]           Patient Information     Date Of Birth          1985        Visit Information        Provider Department      4/12/2017 1:30 PM Marsha Brown, PhD [facility]        Care Instructions    - Use betel nut+tobacco 4 times per day,  Use gum instead on other two times.    - Follow-up with Dr. Brown in 2-3 weeks.        Follow-ups after your visit        Your next 10 appointments already scheduled     Apr 17, 2017  8:20 AM CDT   RETURN OB with Corine Ibarra MD   [facility] ([facility])    [address] [place] [address]   [phone]            Apr 26, 2017  1:30 PM CDT   Return Visit with Marsha Brown PhD   [facility] ([facility])    [address] [place] [address]   [phone]            May 01, 2017  8:20 AM CDT   RETURN OB with Corine Ibarra MD   [facility] ([facility])    [address] [place] [address]   [phone]              Who to contact     Please call your clinic at [phone] to:    Ask questions about your health    Make or cancel appointments    Discuss your medicines    Learn about your test results    Speak to your doctor   If you have compliments or concerns about an experience at your clinic, or if you wish to file a complaint, please contact [facility] Patient Relations at [phone] or email us at [email or web address]         Additional Information About Your Visit        MyChart Information     [employer] is an electronic gateway that provides easy, online access to your medical records. With [employer], you can request a clinic appointment, read your test results, renew a prescription or communicate with your care team.     To sign up for [employer] visit the website at [email or web address]   You will be asked to enter the access code listed below, as well as some personal information.  Please follow the directions to create your username and password.     Your access code is: [email or web address]  Expires: 2017  3:18 PM     Your access code will  in 90 days. If you need help or a new code, please contact your [facility] or call [phone] for assistance.        Care EveryWhere ID     This is your Care EveryWhere ID. This could be used by other organizations to access your [facility] medical records  [id number]        Your Vitals Were     Last Period                   2016 (Exact Date)            Blood Pressure from Last 3 Encounters:   17 107/70   17 107/73    Weight from Last 3 Encounters:   17 146 lb (66.2 kg)   17 141 lb 6.4 oz (64.1 kg)              Today, you had the following     No orders found for display       Primary Care Provider Office Phone # Fax #    Corine Alivia Ibarra [phone]       [place] [address]        Thank you!     Thank you for choosing [facility]  for your care. Our goal is always to provide you with excellent care. Hearing back from our patients is one way we can continue to improve our services. Please take a few minutes to complete the written survey that you may receive in the mail after your visit with us. Thank you!             Your Updated Medication List - Protect others around you: Learn how to safely use, store and throw away your medicines at www.disposemymeds.org.          This list is accurate as of: 17  1:47 PM.  Always use your most recent med list.                   Brand Name Dispense Instructions for use    acetaminophen 325 MG tablet    TYLENOL    100 tablet    Take 2 tablets (650 mg) by mouth every 4 hours as needed for mild pain       metroNIDAZOLE 500 MG tablet    FLAGYL    14 tablet    Take 1 tablet (500 mg) by mouth 2 times daily       prenatal multivitamin  plus iron 27-0.8 MG Tabs per tablet     100 tablet     Take 1 tablet by mouth daily

## 2017-04-13 NOTE — PROGRESS NOTES
I have reviewed and agree with the behavioral health fellow's documentation for this visit.  I did not personally see the patient.  Marie Sweet, PhD., LP

## 2017-04-17 ENCOUNTER — OFFICE VISIT (OUTPATIENT)
Dept: FAMILY MEDICINE | Facility: CLINIC | Age: 32
End: 2017-04-17

## 2017-04-17 VITALS
TEMPERATURE: 98 F | DIASTOLIC BLOOD PRESSURE: 67 MMHG | BODY MASS INDEX: 29.31 KG/M2 | HEART RATE: 94 BPM | HEIGHT: 59 IN | SYSTOLIC BLOOD PRESSURE: 100 MMHG | WEIGHT: 145.4 LBS

## 2017-04-17 DIAGNOSIS — J30.2 SEASONAL ALLERGIC RHINITIS, UNSPECIFIED ALLERGIC RHINITIS TRIGGER: ICD-10-CM

## 2017-04-17 DIAGNOSIS — O09.893 SUPERVISION OF OTHER HIGH RISK PREGNANCIES, THIRD TRIMESTER: Primary | ICD-10-CM

## 2017-04-17 RX ORDER — LORATADINE 10 MG/1
10 TABLET ORAL DAILY
Qty: 30 TABLET | Refills: 0 | Status: SHIPPED | OUTPATIENT
Start: 2017-04-17 | End: 2017-05-01

## 2017-04-17 NOTE — PATIENT INSTRUCTIONS
For allergies:  1. Take loratidine 10 mg every day    For pregnancy;  1. Schedule ultrasound to look at baby's brain again after 4/28/17    Follow up with Dr. Ibarra in 2 weeks    OB ULTRASOUND REFERRAL:  [facility]  Radiology Department  [address] [place] [address]  [phone]  Date:  Monday May 1, 2017  Time:  9:15 AM  Please have a full bladder by drinking at least 24 ounces of water and refrain from using the bathroom 1 hour prior to appointment.  Jose Manuel  has been requested.  Given to Sanjiv Bravo.  Madeline SAENZ Pack  4/19/17

## 2017-04-17 NOTE — MR AVS SNAPSHOT
After Visit Summary   4/17/2017    Sampson Fonseca    MRN: [id number]           Patient Information     Date Of Birth          1985        Visit Information        Provider Department      4/17/2017 8:20 AM Corine Ibarra MD [facility]        Today's Diagnoses     Seasonal allergic rhinitis, unspecified allergic rhinitis trigger    -  1    Supervision of other high risk pregnancies, third trimester          Care Instructions    For allergies:  1. Take loratidine 10 mg every day    For pregnancy;  1. Schedule ultrasound to look at baby's brain again after 4/28/17    Follow up with Dr. Ibarra in 2 weeks        Follow-ups after your visit        Your next 10 appointments already scheduled     Apr 26, 2017  1:30 PM CDT   Return Visit with Marsha Brown, PhD   [facility] ([facility])    [address] [place] [id number] [phone]            May 01, 2017  8:20 AM CDT   RETURN OB with Corine Ibarra MD   [facility] ([facility])    [address] [place] [id number] [phone]              Future tests that were ordered for you today     Open Future Orders        Priority Expected Expires Ordered    US OB LIMITED FOLLOW UP/REPEAT Routine 7/16/2017 4/17/2018 4/17/2017            Who to contact     Please call your clinic at [phone] to:    Ask questions about your health    Make or cancel appointments    Discuss your medicines    Learn about your test results    Speak to your doctor   If you have compliments or concerns about an experience at your clinic, or if you wish to file a complaint, please contact [facility] Patient Relations at [phone] or email us at [email or web address]         Additional Information About Your Visit        MyChart Information     [employer] is an electronic gateway that provides easy, online access to your medical records. With [employer], you can request a clinic appointment, read  "your test results, renew a prescription or communicate with your care team.     To sign up for [employer] visit the website at [email or web address]   You will be asked to enter the access code listed below, as well as some personal information. Please follow the directions to create your username and password.     Your access code is: [email or web address]  Expires: 2017  3:18 PM     Your access code will  in 90 days. If you need help or a new code, please contact your [facility] or call [phone] for assistance.        Care EveryWhere ID     This is your Care EveryWhere ID. This could be used by other organizations to access your [facility] medical records  [id number]        Your Vitals Were     Pulse Temperature Height Last Period BMI (Body Mass Index)       94 98  F (36.7  C) (Oral) 4' 11.25\" (150.5 cm) 2016 (Exact Date) 29.12 kg/m2        Blood Pressure from Last 3 Encounters:   17 100/67   17 107/70   17 107/73    Weight from Last 3 Encounters:   17 145 lb 6.4 oz (66 kg)   17 146 lb (66.2 kg)   17 141 lb 6.4 oz (64.1 kg)                 Today's Medication Changes          These changes are accurate as of: 17  9:00 AM.  If you have any questions, ask your nurse or doctor.               Start taking these medicines.        Dose/Directions    loratadine 10 MG tablet   Commonly known as:  CLARITIN   Used for:  Seasonal allergic rhinitis, unspecified allergic rhinitis trigger   Started by:  Corine Ibarra MD        Dose:  10 mg   Take 1 tablet (10 mg) by mouth daily   Quantity:  30 tablet   Refills:  0            Where to get your medicines      These medications were sent to [facility] - [place], [place] - [address], [place] [address]     Phone:  [phone]     loratadine 10 MG tablet                Primary Care Provider Office Phone # Fax #    Corine Ibarra MD " [phone]       [address] [address]        Thank you!     Thank you for choosing [facility]  for your care. Our goal is always to provide you with excellent care. Hearing back from our patients is one way we can continue to improve our services. Please take a few minutes to complete the written survey that you may receive in the mail after your visit with us. Thank you!             Your Updated Medication List - Protect others around you: Learn how to safely use, store and throw away your medicines at www.disposemymeds.org.          This list is accurate as of: 4/17/17  9:00 AM.  Always use your most recent med list.                   Brand Name Dispense Instructions for use    acetaminophen 325 MG tablet    TYLENOL    100 tablet    Take 2 tablets (650 mg) by mouth every 4 hours as needed for mild pain       loratadine 10 MG tablet    CLARITIN    30 tablet    Take 1 tablet (10 mg) by mouth daily       metroNIDAZOLE 500 MG tablet    FLAGYL    14 tablet    Take 1 tablet (500 mg) by mouth 2 times daily       prenatal multivitamin  plus iron 27-0.8 MG Tabs per tablet     100 tablet    Take 1 tablet by mouth daily

## 2017-04-17 NOTE — PROGRESS NOTES
Preceptor attestation:  Patient seen and discussed with the resident. Assessment and plan reviewed with resident and agreed upon.  Supervising physician: Mitul Trimble  [facility]

## 2017-04-17 NOTE — PROGRESS NOTES
"Subjective  Concerns: She has had a runny nose for the last 2 days.  Things seem to be staying about the same. No fever. She has also had a cough--non-productive.  No ill contacts. She states that her eyes are itchy all the time.  She has tried tylenol with little relief.  Has never been told she has allergies.    She is trying to cut back on betel nut--now chewing 5 times per day, down from 6 times per day.     Headache absent  Changes in vision absent  Chest pain: chest tightness with cough  Dyspnea absent  Nausea absent without vomiting.  Abdominal pain absent.  Contractions present, 3-4 times per day, [place]  Dysuria absent  Vaginal Discharge absent  Vaginal bleeding absent.  Loss of Fluid absent   Extremity swelling absent.   Fetal movement present.    STD risk screening   Have you used alcohol or illicit drugs?No    Do you have multiple sex partners? No    Have you had a sexually transmitted disease?No    Have you had syphilis?No    Have you had a sexual partner who is HIV positive?No    Patient Active Problem List   Diagnosis     Supervision of high risk pregnancy in second trimester     Late prenatal care affecting pregnancy in second trimester     Language barrier, cultural differences     Need for varicella vaccine     Tobacco abuse       Sampson Fonseca speaks Romanian so an  was used today.    Guidance:  seatbelt use  fetal growth and movement  signs of  labor    Objective  /67  Pulse 94  Temp 98  F (36.7  C) (Oral)  Ht 4' 11.25\" (150.5 cm)  Wt 145 lb 6.4 oz (66 kg)  LMP 2016 (Exact Date)  BMI 29.12 kg/m2  No distress.  Nasal turbinates pale and boggy bilaterally. Gravid abdomen.  .  Fundal height 31 cm.  no edema.    Results  Blood type: B POS  Results for orders placed or performed in visit on 17   Urinalysis([facility])   Result Value Ref Range    Specific Gravity Urine 1.010 1.005 - 1.030    pH Urine 6.5 4.5 - 8.0    Leukocyte Esterase UR Negative NEGATIVE "    Nitrite Urine Negative NEGATIVE    Protein UR Negative NEGATIVE    Glucose Urine Negative NEGATIVE    Ketones Urine Negative NEGATIVE    Urobilinogen mg/dL 0.2 E.U./dL 0.2 E.U./dL    Bilirubin UR Negative NEGATIVE    Blood UR Negative NEGATIVE   Urine Culture ([facility])   Result Value Ref Range    Culture SEE RESULTS BELOW     Narrative    Test performed by:  [facility]  [address] [place] [address]   Glucose Challenge  1 Hr  ([facility])   Result Value Ref Range    Glu Gest Screen 1hr 50g 101 0 - 129       Assessment & Plan  31 year old  at 30w1d with DUNCAN 2017 based on LMP and second trimester US.    Sampson was seen today for prenatal care.    Diagnoses and all orders for this visit:    Supervision of other high risk pregnancies, third trimester  Patient is doing well today, no pregnancy related concerns.  FHT reassuring, fundal height consistent with dates.  BP normal today.  High risk because of language barrier and late onset PNC.  Fetal survey normal, and confirmed dates.  Unable to visualize posterior fossa due to baby's position.  -     US OB LIMITED FOLLOW UP/REPEAT; follow up posterior fossa    Seasonal allergic rhinitis, unspecified allergic rhinitis trigger  Patient with runny nose, itchy eyes, and and cough starting this week.  Peak allergy season. Nasal turbinates are boggy and pale, consistent with allergic rhinitis.  -     loratadine (CLARITIN) 10 MG tablet; Take 1 tablet (10 mg) by mouth daily      Return to clinic in 2 weeks.    Corine Ibarra PGY-2  I precepted today with Mitul Trimble MD.

## 2017-04-26 ENCOUNTER — OFFICE VISIT (OUTPATIENT)
Dept: PSYCHOLOGY | Facility: CLINIC | Age: 32
End: 2017-04-26

## 2017-04-26 DIAGNOSIS — Z72.0 TOBACCO ABUSE: Primary | ICD-10-CM

## 2017-04-26 NOTE — PROGRESS NOTES
"[facility] Follow Up Note    Length of visit: 30 minutes  Others present:  (PASQUALE Branch Burmese)  Number of visits post initial assessment: 1    Identifying Information and Presenting Problem:  The patient is a 31 year old  female who was referred to [facility] by Dr. Ibarra for help with reducing tobacco use during pregnancy.    Visit Summary:  -Sampson has reduced tobacco+betel nut use from 6x/day to 4-5x/day. Our goal from last visit was to reduce to 4x/day. Congratulated her on this success. She said that she was successful with this goal because she committed herself to achieving it and reminded herself of her main motivation for reducing, her baby's health, whenever she felt cravings (usually after eating).     -Sampson was ambivalent about reducing more today. Used Motivational Interviewing approach to explore ambivalence. She feels unable to totally quit because of the cravings, its constant availability in the home, and because she worries she would be \"bored\" if she never used. Cravings last about 5-10 minutes, and then go away. Discussed reducing boredom to help her with this change. She enjoys watching her children read and play, so we discussed doing that when she is starting to feel either cravings or boredom. At this point it does not seem feasible to remove tobacco from the home because other family members use it and are not willing to quit. Will discuss at upcoming visit strategies to limit access.    -After this discussion she was willing to reduce to 3-4 times per day. She felt 2/3 (somewhat) confident she can make this change and said she will try her best.      Objective: Pt appears awake and alert for today's visit.       Diagnosis: Tobacco Abuse    Plan:  1. Follow-up in 2 weeks.  2. Reduce tobacco use to 3-4x/day  3. Plan to explore if family would be willing to limit her access to tobacco to support her behavior change.      "

## 2017-05-01 ENCOUNTER — OFFICE VISIT (OUTPATIENT)
Dept: FAMILY MEDICINE | Facility: CLINIC | Age: 32
End: 2017-05-01

## 2017-05-01 ENCOUNTER — RECORDS - HEALTHEAST (OUTPATIENT)
Dept: ADMINISTRATIVE | Facility: OTHER | Age: 32
End: 2017-05-01

## 2017-05-01 VITALS
HEIGHT: 59 IN | TEMPERATURE: 98 F | DIASTOLIC BLOOD PRESSURE: 71 MMHG | SYSTOLIC BLOOD PRESSURE: 107 MMHG | BODY MASS INDEX: 30.04 KG/M2 | HEART RATE: 98 BPM | WEIGHT: 149 LBS

## 2017-05-01 DIAGNOSIS — Z72.0 TOBACCO ABUSE: ICD-10-CM

## 2017-05-01 DIAGNOSIS — O09.93 SUPERVISION OF HIGH RISK PREGNANCY IN THIRD TRIMESTER: Primary | ICD-10-CM

## 2017-05-01 DIAGNOSIS — N89.8 VAGINAL DISCHARGE: ICD-10-CM

## 2017-05-01 PROBLEM — O09.92 SUPERVISION OF HIGH RISK PREGNANCY IN SECOND TRIMESTER: Status: RESOLVED | Noted: 2017-03-20 | Resolved: 2017-05-01

## 2017-05-01 LAB
BACTERIA: NORMAL
CLUE CELLS: NORMAL
HEMOGLOBIN: 11.7 G/DL (ref 11.7–15.7)
MOTILE TRICHOMONAS: NEGATIVE
ODOR: NORMAL
PH WET PREP: NORMAL
WBC WET PREP: NORMAL
YEAST: NORMAL

## 2017-05-01 NOTE — PROGRESS NOTES
"Subjective  Concerns: No concerns today, feeling well.     She stopped the allergy pill already, because    She is down to chewing betel nut 3-4 times per day.  It is very difficult for her, because her family chews it frequently.  She has been going to lifestyle clinic with Dr. Brown and finds this helpful.    OB Review of Systems  Headache only once in a while, uses Tylenol with good relief  Changes in vision absent   Chest pain  absent   Dyspnea absent   Nausea absent without vomiting.   Abdominal pain absent.   Contractions absent    Dysuria absent   Vaginal Discharge present, white discharge  Vaginal bleeding absent.    Loss of Fluid absent     Extremity swelling absent.     Fetal movement absent.    STD risk screening     Have you used alcohol or illicit drugs?No    Do you have multiple sex partners? No    Have you had a sexually transmitted disease?No    Have you had syphilis?No    Have you had a sexual partner who is HIV positive?No    Have you travelled during the pregnancy?No  Have your sexual partner(s) travelled during the pregnancy?No     Patient Active Problem List   Diagnosis     Late prenatal care affecting pregnancy in second trimester     Language barrier, cultural differences     Need for varicella vaccine     Tobacco abuse     Supervision of high risk pregnancy in third trimester       Sampson MAIN Fonseca speaks Singaporean so an  was used today.    Guidance:  Triage phone number  signs of  labor    Objective  /71  Pulse 98  Temp 98  F (36.7  C) (Oral)  Ht 4' 11\" (149.9 cm)  Wt 149 lb (67.6 kg)  LMP 2016 (Exact Date)  Breastfeeding? No  BMI 30.09 kg/m2  No distress.  Gravid abdomen.  .  Fundal height 32 cm.  no edema.   appears to have physiologic discharge    Results  Blood type: B POS  Results for orders placed or performed in visit on 17   Urinalysis([facility])   Result Value Ref Range    Specific Gravity Urine 1.010 1.005 - 1.030    pH Urine 6.5 4.5 - 8.0    " Leukocyte Esterase UR Negative NEGATIVE    Nitrite Urine Negative NEGATIVE    Protein UR Negative NEGATIVE    Glucose Urine Negative NEGATIVE    Ketones Urine Negative NEGATIVE    Urobilinogen mg/dL 0.2 E.U./dL 0.2 E.U./dL    Bilirubin UR Negative NEGATIVE    Blood UR Negative NEGATIVE   Urine Culture ([facility])   Result Value Ref Range    Culture SEE RESULTS BELOW     Narrative    Test performed by:  [facility]  [address] [place] [address]   Glucose Challenge  1 Hr  ([facility])   Result Value Ref Range    Glu Gest Screen 1hr 50g 101 0 - 129       Assessment & Plan  31 year old  at 32w1d with DUNCAN 2017 based on LMP    Diagnoses and all orders for this visit:    Supervision of high risk pregnancy in third trimester  Patient doing well today, no concerns.  High risk for language barrier, tobacco use during pregnancy, and late onset prenatal care.   During ROS, does note some increased vaginal discharge--appears physiologic, but will check wet prep. FHT reassuring, FH consistent with dates. BP normal.  She had normal GCT last week.  Due for hemoglobin and RPR today (did not have at last visit due to late onset PNC).  Plan for GBS at next visit or the following.    -     Hemoglobin (HGB) ([facility])  -     Syphilis Screen [place] (RPR/VDRL) ([facility])  -  Wet prep    Tobacco abuse  She has been going to lifestyle clinic with Dr. Brown to work on tobacco cessation, as she chews Betel nut.  She has cut back to 3-4 times per day, which was her identified goal at her last visit with Dr. Brown.   - Continue lifestyle clinic    Unsure total weight gain, as patient does not know how much she weighed before getting pregnant, but weight gain velocity has been appropriate since she established at our clinic.    Return to clinic in 2 weeks.    Corine Ibarra PGY-2  I precepted today with Gregg Soliman MD.

## 2017-05-01 NOTE — PROGRESS NOTES
Preceptor attestation:  Patient seen and discussed with the resident. Assessment and plan reviewed with resident and agreed upon.  Supervising physician: Gregg Soliman  [facility]

## 2017-05-01 NOTE — PATIENT INSTRUCTIONS
[facility]   Phone number: [phone]    [facility]  Phone number: [phone]  Address: [address]     Follow up with Dr. Ibarra in 2 weeks

## 2017-05-01 NOTE — MR AVS SNAPSHOT
After Visit Summary   5/1/2017    Sampson Fonseca    MRN: [id number]           Patient Information     Date Of Birth          1985        Visit Information        Provider Department      5/1/2017 8:20 AM Corine Ibarra MD [facility]        Today's Diagnoses     Supervision of high risk pregnancy in third trimester    -  1    Tobacco abuse        Vaginal discharge          Care Instructions    [facility]   Phone number: [phone]    [facility]  Phone number: [phone]  Address: [address] [place] [address]     Follow up with Dr. Ibarra in 2 weeks        Follow-ups after your visit        Your next 10 appointments already scheduled     May 10, 2017  4:00 PM CDT   Return Visit with Marsha Brown, PhD   [facility] ([facility])    [address] [place] [address] [phone]            May 15, 2017  1:10 PM CDT   RETURN OB with Corine Ibarra MD   [facility] ([facility])    [address] [place] [address] [phone]              Who to contact     Please call your clinic at [phone] to:    Ask questions about your health    Make or cancel appointments    Discuss your medicines    Learn about your test results    Speak to your doctor   If you have compliments or concerns about an experience at your clinic, or if you wish to file a complaint, please contact [facility] Patient Relations at [phone] or email us at [email or web address]         Additional Information About Your Visit        MyChart Information     Wireless Generationt is an electronic gateway that provides easy, online access to your medical records. With TruQC, you can request a clinic appointment, read your test results, renew a prescription or communicate with your care team.     To sign up for Wireless Generationt visit the website at [email or web address]   You will be asked to enter the access code listed below, as well as  "some personal information. Please follow the directions to create your username and password.     Your access code is: [email or web address]  Expires: 2017  3:18 PM     Your access code will  in 90 days. If you need help or a new code, please contact your [facility] or call [phone] for assistance.        Care EveryWhere ID     This is your Care EveryWhere ID. This could be used by other organizations to access your [place] medical records  [id number]        Your Vitals Were     Pulse Temperature Height Last Period Breastfeeding? BMI (Body Mass Index)    98 98  F (36.7  C) (Oral) 4' 11\" (149.9 cm) 2016 (Exact Date) No 30.09 kg/m2       Blood Pressure from Last 3 Encounters:   17 107/71   17 100/67   17 107/70    Weight from Last 3 Encounters:   17 149 lb (67.6 kg)   17 145 lb 6.4 oz (66 kg)   17 146 lb (66.2 kg)              We Performed the Following     Hemoglobin (HGB) ([facility])     Syphilis Screen [place] (RPR/VDRL) ([facility])     Wet Prep ([facility])          Today's Medication Changes          These changes are accurate as of: 17  9:06 AM.  If you have any questions, ask your nurse or doctor.               Stop taking these medicines if you haven't already. Please contact your care team if you have questions.     loratadine 10 MG tablet   Commonly known as:  CLARITIN   Stopped by:  Corine Ibarra MD                    Primary Care Provider Office Phone # Fax #    Corine Ibarra [phone]       [address] [address]        Thank you!     Thank you for choosing [facility]  for your care. Our goal is always to provide you with excellent care. Hearing back from our patients is one way we can continue to improve our services. Please take a few minutes to complete the written survey that you may receive in the mail after your visit with us. Thank you!   "           Your Updated Medication List - Protect others around you: Learn how to safely use, store and throw away your medicines at www.disposemymeds.org.          This list is accurate as of: 5/1/17  9:06 AM.  Always use your most recent med list.                   Brand Name Dispense Instructions for use    acetaminophen 325 MG tablet    TYLENOL    100 tablet    Take 2 tablets (650 mg) by mouth every 4 hours as needed for mild pain       prenatal multivitamin  plus iron 27-0.8 MG Tabs per tablet     100 tablet    Take 1 tablet by mouth daily

## 2017-05-02 LAB — RPR SER QL: NORMAL

## 2017-05-10 ENCOUNTER — OFFICE VISIT (OUTPATIENT)
Dept: PSYCHOLOGY | Facility: CLINIC | Age: 32
End: 2017-05-10

## 2017-05-10 DIAGNOSIS — Z72.0 TOBACCO ABUSE: Primary | ICD-10-CM

## 2017-05-10 NOTE — MR AVS SNAPSHOT
After Visit Summary   5/10/2017    Sampson Fonseca    MRN: [id number]           Patient Information     Date Of Birth          1985        Visit Information        Provider Department      5/10/2017 4:00 PM Marsha Brown, PhD [facility]        Today's Diagnoses     Tobacco abuse    -  1       Follow-ups after your visit        Follow-up notes from your care team     Return in about 2 weeks (around 2017).      Your next 10 appointments already scheduled     May 15, 2017  1:10 PM CDT   RETURN OB with Corine Ibarra MD   [facility] ([facility])    [address] [place] [address]   [phone]            May 26, 2017  8:30 AM CDT   Return Visit with Marsha Brown PhD   [facility] ([facility])    [address] [place] [address]   [phone]              Who to contact     Please call your clinic at [phone] to:    Ask questions about your health    Make or cancel appointments    Discuss your medicines    Learn about your test results    Speak to your doctor   If you have compliments or concerns about an experience at your clinic, or if you wish to file a complaint, please contact [facility] Patient Relations at [phone] or email us at [email or web address]         Additional Information About Your Visit        MyChart Information     Fourth Wall Studiost is an electronic gateway that provides easy, online access to your medical records. With Overture Technologies, you can request a clinic appointment, read your test results, renew a prescription or communicate with your care team.     To sign up for Fourth Wall Studiost visit the website at [email or web address]   You will be asked to enter the access code listed below, as well as some personal information. Please follow the directions to create your username and password.     Your access code is: [email or web address]  Expires: 2017  3:18 PM     Your access code will  in 90 days. If you need  help or a new code, please contact your [facility] or call [phone] for assistance.        Care EveryWhere ID     This is your Care EveryWhere ID. This could be used by other organizations to access your [place] medical records  [id number]        Your Vitals Were     Last Period                   09/18/2016 (Exact Date)            Blood Pressure from Last 3 Encounters:   05/01/17 107/71   04/17/17 100/67   04/06/17 107/70    Weight from Last 3 Encounters:   05/01/17 149 lb (67.6 kg)   04/17/17 145 lb 6.4 oz (66 kg)   04/06/17 146 lb (66.2 kg)              We Performed the Following     Health & Behavior Intervention 15 min (91966, 1 unit)        Primary Care Provider Office Phone # Fax #    Corine Alivia Ibarra [phone]       [place] [address]        Thank you!     Thank you for choosing [facility]  for your care. Our goal is always to provide you with excellent care. Hearing back from our patients is one way we can continue to improve our services. Please take a few minutes to complete the written survey that you may receive in the mail after your visit with us. Thank you!             Your Updated Medication List - Protect others around you: Learn how to safely use, store and throw away your medicines at www.disposemymeds.org.          This list is accurate as of: 5/10/17 11:59 PM.  Always use your most recent med list.                   Brand Name Dispense Instructions for use    acetaminophen 325 MG tablet    TYLENOL    100 tablet    Take 2 tablets (650 mg) by mouth every 4 hours as needed for mild pain       prenatal multivitamin  plus iron 27-0.8 MG Tabs per tablet     100 tablet    Take 1 tablet by mouth daily

## 2017-05-10 NOTE — PROGRESS NOTES
[facility] Follow Up Note    Length of visit: 20 minutes  Others present:  (APSQUALE Branch Burmese)  Number of visits post initial assessment: 2    Identifying Information and Presenting Problem:  The patient is a 31 year old  female who was referred to [facility] by Dr. Ibarra for help with reducing tobacco use during pregnancy.    Visit Summary:  Sampson has maintained use at typically 4x/day, whereas during last visit she was using 4-5x/day, so congratulated her on this continued downtrend in use. Her main trigger is seeing her  and father use around her. She has not asked them to limit use around her or possibly limit her access to tobacco, but was willing to have this conversation with them. She is not sure if they will be willing to make these changes, and will talk with them to see what they think. They have been supportive of her own change, and I encouraged her to share that her main trigger is seeing their use, so that they are aware this is an important area they could support her.    Also discussed how the betel nut combination she uses is a combination of betel nut, lime, and tobacco. Asked if she would be willing to continue using 4x a day, but cut out the tobacco portion of the combination. She was willing to try this strategy.      Objective: Pt appears awake and alert for today's visit.       Diagnosis: Tobacco Abuse    Plan:  1. Follow-up in 2-3 weeks.  2. Continue to use betel nut 4x/day but do not use tobacco.  3. Discuss with family willingness to limit use of betel nut around Sampson.

## 2017-05-15 ENCOUNTER — OFFICE VISIT (OUTPATIENT)
Dept: FAMILY MEDICINE | Facility: CLINIC | Age: 32
End: 2017-05-15

## 2017-05-15 VITALS
HEART RATE: 118 BPM | SYSTOLIC BLOOD PRESSURE: 103 MMHG | TEMPERATURE: 98.6 F | WEIGHT: 150.2 LBS | DIASTOLIC BLOOD PRESSURE: 69 MMHG | BODY MASS INDEX: 30.34 KG/M2

## 2017-05-15 DIAGNOSIS — O09.93 SUPERVISION OF HIGH RISK PREGNANCY IN THIRD TRIMESTER: Primary | ICD-10-CM

## 2017-05-15 NOTE — PROGRESS NOTES
Preceptor attestation:  Patient seen and discussed with the resident. Assessment and plan reviewed with resident and agreed upon.  Supervising physician: Reggie Restrepo  [facility]

## 2017-05-15 NOTE — PROGRESS NOTES
Subjective  Concerns: Doing well today, no concerns.    Seeing Dr. Brown at [facility], back to 4 times per day with betelnut.     Headache occasionally (3 times per week), tylenol helps  Changes in vision absent   Chest pain  absent   Dyspnea absent   Nausea absent without vomiting.  Abdominal pain absent.   Contractions present, having them 3-4 times per day--described as tightening of the belly   Dysuria absent   Vaginal Discharge absent   Vaginal bleeding absent.    Loss of Fluid absent     Extremity swelling mild.     Fetal movement present.    Have you travelled during the pregnancy?No  Have your sexual partner(s) travelled during the pregnancy?No    Patient Active Problem List   Diagnosis     Late prenatal care affecting pregnancy in second trimester     Language barrier, cultural differences     Need for varicella vaccine     Tobacco abuse     Supervision of high risk pregnancy in third trimester       Sampson Fonseca speaks Citizen of the [place] so an  was used today.    Guidance:  birth control  travel  warning signs/PIH    Do you need help getting a car seat? YES  Do you need help getting a breast pump? No    Objective  /69 (BP Location: Left arm, Patient Position: Chair, Cuff Size: Adult Regular)  Pulse 118  Temp 98.6  F (37  C) (Oral)  Wt 150 lb 3.2 oz (68.1 kg)  LMP 09/18/2016 (Exact Date)  BMI 30.34 kg/m2  No distress.  Gravid abdomen.  .  Fundal height 33 cm.  no edema.    Results  Blood type: B POS  Results for orders placed or performed in visit on 05/01/17   Hemoglobin (HGB) ([facility])   Result Value Ref Range    Hemoglobin 11.7 11.7 - 15.7 g/dL   Syphilis Screen [place] (RPR/VDRL) ([facility])   Result Value Ref Range    Syphilis Screen [place] Non-Reactive Non-Reactive    Narrative    Test performed by:  [facility] [address] [place] [address]   Wet Prep ([facility])   Result Value Ref Range    Yeast Wet Prep None none    Motile Trichomonas Wet Prep Negative  Negative    Clue Cells Wet Prep None NONE    WBC WET PREP 2-5 2 - 5    Bacteria Wet Prep Few None    pH Wet Prep Not performed 3.8 - 4.5    Odor Wet Prep None NONE       Assessment & Plan  31 year old  at 34w1d with DUNCAN 2017 based on LMP    Sampson was seen today for prenatal care.    Diagnoses and all orders for this visit:    Supervision of high risk pregnancy in third trimester  Doing well today, no concerns.  High risk due to tobacco use, language barrier, and late prenatal care.  BP normal today. FHT reassuring. Size consistent with dates.  Unable to make it to her follow up US due to schedule conflicts, planning to reschedule this week.  Does not have a car seat and needs help getting one.   -  Needs follow up US for posterior fossa as soon as possible   - Will have OB coordinator help locate car seat   -  GBS to be done next week   - Plan for position and cervical check next week      Weight gain velocity is normal.    Return to clinic in 2 weeks.    Corine Ibarra PGY-2  I precepted today with Reggie Restrepo MD.

## 2017-05-15 NOTE — PATIENT INSTRUCTIONS
Schedule ultrasound as soon as possible    Follow up in 2 weeks with Dr. Ibarra    [facility] Labor and Delivery: [phone]  [address] [facility]: [phone]

## 2017-05-15 NOTE — MR AVS SNAPSHOT
After Visit Summary   5/15/2017    Sampson Fonseca    MRN: [id number]           Patient Information     Date Of Birth          1985        Visit Information        Provider Department      5/15/2017 1:10 PM Corine Ibarra MD [facility]        Today's Diagnoses     Supervision of high risk pregnancy in third trimester    -  1      Care Instructions    Schedule ultrasound as soon as possible    Follow up in 2 weeks with Dr. Ibarra    [facility] Labor and Delivery: [phone]  [address] [facility]: [phone]        Follow-ups after your visit        Your next 10 appointments already scheduled     May 26, 2017  8:30 AM CDT   Return Visit with Marsha Brown PhD   [facility] ([facility])    [address] [facility] [id number] [phone]              Who to contact     Please call your clinic at [phone] to:    Ask questions about your health    Make or cancel appointments    Discuss your medicines    Learn about your test results    Speak to your doctor   If you have compliments or concerns about an experience at your clinic, or if you wish to file a complaint, please contact [facility] Patient Relations at [phone] or email us at [email or web address]         Additional Information About Your Visit        MyChart Information     Starfish Retention Solutionst is an electronic gateway that provides easy, online access to your medical records. With Fermentalg, you can request a clinic appointment, read your test results, renew a prescription or communicate with your care team.     To sign up for Starfish Retention Solutionst visit the website at [email or web address]   You will be asked to enter the access code listed below, as well as some personal information. Please follow the directions to create your username and password.     Your access code is: [email or web address]  Expires: 2017  3:18 PM     Your access code will  in 90 days. If  you need help or a new code, please contact your [facility] or call [phone] for assistance.        Care EveryWhere ID     This is your Care EveryWhere ID. This could be used by other organizations to access your [place] medical records  [id number]        Your Vitals Were     Pulse Temperature Last Period BMI (Body Mass Index)          118 98.6  F (37  C) (Oral) 09/18/2016 (Exact Date) 30.34 kg/m2         Blood Pressure from Last 3 Encounters:   05/15/17 103/69   05/01/17 107/71   04/17/17 100/67    Weight from Last 3 Encounters:   05/15/17 150 lb 3.2 oz (68.1 kg)   05/01/17 149 lb (67.6 kg)   04/17/17 145 lb 6.4 oz (66 kg)              Today, you had the following     No orders found for display       Primary Care Provider Office Phone # Fax #    Corine Alivia Ibarra [phone]       [place] [address]        Thank you!     Thank you for choosing [facility]  for your care. Our goal is always to provide you with excellent care. Hearing back from our patients is one way we can continue to improve our services. Please take a few minutes to complete the written survey that you may receive in the mail after your visit with us. Thank you!             Your Updated Medication List - Protect others around you: Learn how to safely use, store and throw away your medicines at www.disposemymeds.org.          This list is accurate as of: 5/15/17  1:47 PM.  Always use your most recent med list.                   Brand Name Dispense Instructions for use    acetaminophen 325 MG tablet    TYLENOL    100 tablet    Take 2 tablets (650 mg) by mouth every 4 hours as needed for mild pain       prenatal multivitamin  plus iron 27-0.8 MG Tabs per tablet     100 tablet    Take 1 tablet by mouth daily

## 2017-05-15 NOTE — PROGRESS NOTES
Completed online referral for free car seat from [employer].  They will call pt to arrange delivery and education on how to use the car seat./NG

## 2017-05-26 ENCOUNTER — OFFICE VISIT (OUTPATIENT)
Dept: PSYCHOLOGY | Facility: CLINIC | Age: 32
End: 2017-05-26

## 2017-05-26 DIAGNOSIS — Z72.0 TOBACCO ABUSE: Primary | ICD-10-CM

## 2017-05-26 NOTE — MR AVS SNAPSHOT
After Visit Summary   5/26/2017    Sampson Fonseca    MRN: [id number]           Patient Information     Date Of Birth          1985        Visit Information        Provider Department      5/26/2017 8:30 AM Marsha Brown, PhD [facility]        Today's Diagnoses     Tobacco abuse    -  1       Follow-ups after your visit        Your next 10 appointments already scheduled     Jun 01, 2017  1:30 PM CDT   RETURN OB with Corine Ibarra MD   [facility] ([facility])    [address] [place] [address]   [phone]            Jun 08, 2017  1:30 PM CDT   RETURN OB with Corine Ibarra MD   [facility] ([facility])    [address] [place] [id number] [phone]            Jeanmarie 15, 2017  1:30 PM CDT   RETURN OB with Corine Ibarra MD   [facility] ([facility])    [address] [place] [address]   [phone]              Who to contact     Please call your clinic at [phone] to:    Ask questions about your health    Make or cancel appointments    Discuss your medicines    Learn about your test results    Speak to your doctor   If you have compliments or concerns about an experience at your clinic, or if you wish to file a complaint, please contact [facility] Patient Relations at [phone] or email us at [email or web address]         Additional Information About Your Visit        MyChart Information     ENBALA Power Networks is an electronic gateway that provides easy, online access to your medical records. With ENBALA Power Networks, you can request a clinic appointment, read your test results, renew a prescription or communicate with your care team.     To sign up for Snuppst visit the website at [email or web address]   You will be asked to enter the access code listed below, as well as some personal information. Please follow the directions to create your username and password.     Your access code is:  [email or web address]  Expires: 2017  3:18 PM     Your access code will  in 90 days. If you need help or a new code, please contact your [facility] or call [phone] for assistance.        Care EveryWhere ID     This is your Care EveryWhere ID. This could be used by other organizations to access your [place] medical records  [id number]        Your Vitals Were     Last Period                   2016 (Exact Date)            Blood Pressure from Last 3 Encounters:   05/15/17 103/69   17 107/71   17 100/67    Weight from Last 3 Encounters:   05/15/17 150 lb 3.2 oz (68.1 kg)   17 149 lb (67.6 kg)   17 145 lb 6.4 oz (66 kg)              We Performed the Following     Health & Behavior Intervention 15 min (37900, 1 unit)        Primary Care Provider Office Phone # Fax #    Corine Alivia Ibarra [phone]       [place] [address]        Thank you!     Thank you for choosing [facility]  for your care. Our goal is always to provide you with excellent care. Hearing back from our patients is one way we can continue to improve our services. Please take a few minutes to complete the written survey that you may receive in the mail after your visit with us. Thank you!             Your Updated Medication List - Protect others around you: Learn how to safely use, store and throw away your medicines at www.disposemymeds.org.          This list is accurate as of: 17 11:59 PM.  Always use your most recent med list.                   Brand Name Dispense Instructions for use    acetaminophen 325 MG tablet    TYLENOL    100 tablet    Take 2 tablets (650 mg) by mouth every 4 hours as needed for mild pain       prenatal multivitamin  plus iron 27-0.8 MG Tabs per tablet     100 tablet    Take 1 tablet by mouth daily

## 2017-06-01 ENCOUNTER — OFFICE VISIT (OUTPATIENT)
Dept: FAMILY MEDICINE | Facility: CLINIC | Age: 32
End: 2017-06-01

## 2017-06-01 VITALS
DIASTOLIC BLOOD PRESSURE: 67 MMHG | BODY MASS INDEX: 30.36 KG/M2 | SYSTOLIC BLOOD PRESSURE: 103 MMHG | TEMPERATURE: 98.1 F | WEIGHT: 150.6 LBS | HEART RATE: 98 BPM | HEIGHT: 59 IN

## 2017-06-01 DIAGNOSIS — O09.93 SUPERVISION OF HIGH RISK PREGNANCY IN THIRD TRIMESTER: Primary | ICD-10-CM

## 2017-06-01 NOTE — MR AVS SNAPSHOT
After Visit Summary   2017    Sampson Fonseca    MRN: [id number]           Patient Information     Date Of Birth          1985        Visit Information        Provider Department      2017 1:30 PM Corine Ibarra MD [facility]        Today's Diagnoses     Supervision of high risk pregnancy in third trimester    -  1      Care Instructions    Delivery Plan     I NEED A Gibraltarian      Take me to: [facility]  [address] [place], [place]  Phone number: [phone]    My name is: Sampson Fonseca  : 1985    My Doctor is: Corine Ibarra   Attending Physician: [facility]  Prenatal care was at [facility] [phone].    31 year old         -para:   Estimated Date of Delivery: 2017  At 36w4d on 2017  Delivery type: Vaginal Delivery    Patient Active Problem List   Diagnosis     Late prenatal care affecting pregnancy in second trimester     Language barrier, cultural differences     Need for varicella vaccine     Tobacco abuse     Supervision of high risk pregnancy in third trimester     Allergies:No Known Allergies    Lab Results:  Blood Type: B POS  GBS: pending Date completed: 17    Rubella IgG   Date/Time Value Ref Range Status   2017 03:12 PM Immune Immune Final     HIV Antigen/Antibody   Date/Time Value Ref Range Status   2017 03:12 PM Negative Negative Final     Syphilis Screen Cascade   Date/Time Value Ref Range Status   2017 09:08 AM Non-Reactive Non-Reactive Final     Hepatitis B Surface Antigen   Date/Time Value Ref Range Status   2017 03:12 PM Negative Negative Final     Hemoglobin   Date Value Ref Range Status   2017 11.7 11.7 - 15.7 g/dL Final   2017 12.4 11.7 - 15.7 g/dL Final       Last cervical check: 2017 Cervical Dilation:  1 cm, 10%, -2, posterior. Cephalic position    Date of Flu shot: N/A  Date of TDAP: 17    Immunizations  needed postpartum: Varicella    Who will be present at the delivery?  (if he is not at work), father, and sister in law    Do you have a ? No    What are your plans for pain control? Natural    Who will cut the umbilical cord?     Do you plan to breast feed?Yes    If your baby is a boy, would you like him circumcised?N/A    Name of baby's clinic: [facility]      Next Appointment is: 1 week            Follow-ups after your visit        Your next 10 appointments already scheduled     Jun 08, 2017  1:30 PM CDT   RETURN OB with Corine Ibarra MD   [facility] ([facility])    [address] [place] [address]   [phone]            Jeanmarie 15, 2017  1:30 PM CDT   RETURN OB with Corine Ibarra MD   [facility] ([facility])    [address] [place] [address]   [phone]              Who to contact     Please call your clinic at [phone] to:    Ask questions about your health    Make or cancel appointments    Discuss your medicines    Learn about your test results    Speak to your doctor   If you have compliments or concerns about an experience at your clinic, or if you wish to file a complaint, please contact [facility] Patient Relations at [phone] or email us at [email or web address]         Additional Information About Your Visit        Motallyhart Information     [employer] is an electronic gateway that provides easy, online access to your medical records. With [employer], you can request a clinic appointment, read your test results, renew a prescription or communicate with your care team.     To sign up for Mak[employer] visit the website at [email or web address]   You will be asked to enter the access code listed below, as well as some personal information. Please follow the directions to create your username and password.     Your access code is: [email or web address]  Expires: 6/18/2017  3:18 PM     Your access code  "will  in 90 days. If you need help or a new code, please contact your [facility] or call [phone] for assistance.        Care EveryWhere ID     This is your Care EveryWhere ID. This could be used by other organizations to access your [place] medical records  [id number]        Your Vitals Were     Pulse Temperature Height Last Period BMI (Body Mass Index)       98 98.1  F (36.7  C) (Oral) 4' 10.75\" (149.2 cm) 2016 (Exact Date) 30.68 kg/m2        Blood Pressure from Last 3 Encounters:   17 103/67   05/15/17 103/69   17 107/71    Weight from Last 3 Encounters:   17 150 lb 9.6 oz (68.3 kg)   05/15/17 150 lb 3.2 oz (68.1 kg)   17 149 lb (67.6 kg)              We Performed the Following     Clt. Grp B Strp Screen ([facility])        Primary Care Provider Office Phone # Fax #    Corine Alivia Ibarra [phone]       [place] [address]        Thank you!     Thank you for choosing [facility]  for your care. Our goal is always to provide you with excellent care. Hearing back from our patients is one way we can continue to improve our services. Please take a few minutes to complete the written survey that you may receive in the mail after your visit with us. Thank you!             Your Updated Medication List - Protect others around you: Learn how to safely use, store and throw away your medicines at www.disposemymeds.org.          This list is accurate as of: 17  2:22 PM.  Always use your most recent med list.                   Brand Name Dispense Instructions for use    acetaminophen 325 MG tablet    TYLENOL    100 tablet    Take 2 tablets (650 mg) by mouth every 4 hours as needed for mild pain       prenatal multivitamin  plus iron 27-0.8 MG Tabs per tablet     100 tablet    Take 1 tablet by mouth daily         "

## 2017-06-01 NOTE — PROGRESS NOTES
Primary Care Behavioral Health Consult Note    Meeting lasted: 5 minutes  Others present:  (Jose Manuel Branch KTTS)    Identifying Information and Presenting Problem:    Dr. Ibarra requested behavioral health consultation for this patient regarding ongoing assistance with tobacco cessation.  The patient is a 31 year old American female and agreed to be seen by behavioral health today.    Topics Discussed/Interventions Provided:       Sampson is very proud to report she is only using tobacco 1x/day. The fast for Ramadan is going well. The tobacco does not make her feel too dizzy. She would like to reduce to no use during Ramadan. Congratulated her on her amazing progress and encouraged her continued progress toward her goal.    Plan:      Will check-in with Sampson in 2 weeks, at upcoming [facility] visit with Dr. Ibarra on 6/15/17

## 2017-06-01 NOTE — PROGRESS NOTES
"Subjective  Concerns: She is feeling happy today.  Had some contractions last night from 3-9 pm.  Had some pain in lower belly and sometimes in the back. 2-3 times per hour.  Had been fasting that day.  Resolved once fast was broken.    Ramadan is going well.  Fasting some days, but not other days.    She continues to work on quitting betelnut, only chewing 1 time per day.  She is working on cutting back to no use.     She is worried about leg cramping during delivery.     Headache absent   Changes in vision absent   Chest pain  absent   Dyspnea absent   Nausea absent without vomiting.   Abdominal pain absent.   Contractions present   Dysuria absent   Vaginal Discharge absent   Vaginal bleeding absent.    Loss of Fluid absent     Extremity swelling absent.     Fetal movement present.      Patient Active Problem List   Diagnosis     Late prenatal care affecting pregnancy in second trimester     Language barrier, cultural differences     Need for varicella vaccine     Tobacco abuse     Supervision of high risk pregnancy in third trimester       Sampson Fonseca speaks Mongolian so an  was used today.    Guidance:  warning signs/PIH    Do you need help getting a car seat? No--already received her car seat.  Do you need help getting a breast pump? No    Objective  /67  Pulse 98  Temp 98.1  F (36.7  C) (Oral)  Ht 4' 10.75\" (149.2 cm)  Wt 150 lb 9.6 oz (68.3 kg)  LMP 09/18/2016 (Exact Date)  BMI 30.68 kg/m2  No distress.  Gravid abdomen.  .  Fundal height 35 cm.  no edema.  Cervical exam: 1 cm, 10% effaced, -2 station, posterior.   Position: Cephalic--suture lines palpable on cervical check    Results  Blood type: B POS  Results for orders placed or performed in visit on 05/01/17   Hemoglobin (HGB) ([facility])   Result Value Ref Range    Hemoglobin 11.7 11.7 - 15.7 g/dL   Syphilis Screen Curry (RPR/VDRL) ([facility])   Result Value Ref Range    Syphilis Screen Cascade Non-Reactive Non-Reactive    " Narrative    [facility] by:  [facility]  [address] [place] [address]   Wet Prep ([facility])   Result Value Ref Range    Yeast Wet Prep None none    Motile Trichomonas Wet Prep Negative Negative    Clue Cells Wet Prep None NONE    WBC WET PREP 2-5 2 - 5    Bacteria Wet Prep Few None    pH Wet Prep Not performed 3.8 - 4.5    Odor Wet Prep None NONE       Assessment & Plan  31 year old  at 36w4d with DUNCAN 2017 based on LMP    Sampson was seen today for prenatal care.    Diagnoses and all orders for this visit:    Supervision of high risk pregnancy in third trimester  Patient is doing well today.  High risk for language barrier, late onset prenatal care, tobacco use in pregnancy.  She is doing very well.  Has some concerns about the logistics of delivery.  BP normal, FHT reassuring, size consistent for dates.  Fasting for Ramadan at times. Has follow up ultrasound for posterior fossa (unable to see on fetal survey due to position--she has missed previous appointments.  -     Clt. Grp B Strp Screen ([facility])  - Go to follow up US tomorrow for posterior fossa  - Delivery plan completed today      Weight gain adequate    Return to clinic in 1 weeks.    Corine Ibarra PGY-2  I precepted today with Mabel James MD.

## 2017-06-01 NOTE — PATIENT INSTRUCTIONS
Delivery Plan     I NEED A Taiwanese      Take me to:   [address] [place], [place]  Phone number: [phone]    My name is: Sampson Fonseca  : 1985    My Doctor is: Corine Ibarra   Attending Physician: [facility]  Prenatal care was at [facility] [phone].    31 year old         -para:   Estimated Date of Delivery: 2017  At 36w4d on 2017  Delivery type: Vaginal Delivery    Patient Active Problem List   Diagnosis     Late prenatal care affecting pregnancy in second trimester     Language barrier, cultural differences     Need for varicella vaccine     Tobacco abuse     Supervision of high risk pregnancy in third trimester     Allergies:No Known Allergies    Lab Results:  Blood Type: B POS  GBS: pending Date completed: 17    Rubella IgG   Date/Time Value Ref Range Status   2017 03:12 PM Immune Immune Final     HIV Antigen/Antibody   Date/Time Value Ref Range Status   2017 03:12 PM Negative Negative Final     Syphilis Screen Cascade   Date/Time Value Ref Range Status   2017 09:08 AM Non-Reactive Non-Reactive Final     Hepatitis B Surface Antigen   Date/Time Value Ref Range Status   2017 03:12 PM Negative Negative Final     Hemoglobin   Date Value Ref Range Status   2017 11.7 11.7 - 15.7 g/dL Final   2017 12.4 11.7 - 15.7 g/dL Final       Last cervical check: 2017 Cervical Dilation:  1 cm, 10%, -2, posterior. Cephalic position    Date of Flu shot: N/A  Date of TDAP: 17    Immunizations needed postpartum: Varicella    Who will be present at the delivery?  (if he is not at work), father, and sister in law    Do you have a ? No    What are your plans for pain control? Natural    Who will cut the umbilical cord?     Do you plan to breast feed?Yes    If your baby is a boy, would you like him circumcised?N/A    Name of baby's clinic[facility]      Next Appointment is: 1 week

## 2017-06-01 NOTE — PROGRESS NOTES
"Preceptor attestation:  Vital signs reviewed: /67  Pulse 98  Temp 98.1  F (36.7  C) (Oral)  Ht 4' 10.75\" (149.2 cm)  Wt 150 lb 9.6 oz (68.3 kg)  LMP 09/18/2016 (Exact Date)  BMI 30.68 kg/m2    Patient seen and discussed with the resident.  Assessment and plan reviewed with resident and agreed upon.    Supervising physician: Mabel James MD  [facility]  "

## 2017-06-02 DIAGNOSIS — O09.893 SUPERVISION OF OTHER HIGH RISK PREGNANCIES, THIRD TRIMESTER: ICD-10-CM

## 2017-06-03 LAB
ALLERGIC TO PENICILLIN: NO
GP B STREP AG SPEC QL LA: NEGATIVE

## 2017-06-08 ENCOUNTER — OFFICE VISIT (OUTPATIENT)
Dept: FAMILY MEDICINE | Facility: CLINIC | Age: 32
End: 2017-06-08

## 2017-06-08 VITALS
DIASTOLIC BLOOD PRESSURE: 63 MMHG | BODY MASS INDEX: 30.76 KG/M2 | HEART RATE: 93 BPM | HEIGHT: 59 IN | SYSTOLIC BLOOD PRESSURE: 101 MMHG | TEMPERATURE: 98 F | WEIGHT: 152.6 LBS

## 2017-06-08 DIAGNOSIS — O09.93 SUPERVISION OF HIGH RISK PREGNANCY IN THIRD TRIMESTER: Primary | ICD-10-CM

## 2017-06-08 NOTE — PROGRESS NOTES
"Subjective  Concerns: No concerns today, feeling well.    Has really cut back on betelnut, and is now chewing just once every other day.    Fasting at times for Ramadan.  Occasionally having dizziness, but she eats on those days and feels better after eating.     Clarified today that she plans on delivering at [facility].  She had originally thought she was supposed to go to [facility].    Headaches are occasional. Last 15-20 min. No vision changes.    Has had some contractions--mostly in the late afternoon the last couple of days. No more than 3 contractions per hour. Moderate in intensity.     Headache present, see above  Changes in vision absent   Chest pain  absent   Dyspnea absent   Nausea absent without vomiting.   Abdominal pain absent.   Contractions present, see above  Dysuria absent   Vaginal Discharge absent   Vaginal bleeding absent.    Loss of Fluid absent     Extremity swelling present.     Fetal movement present.    Patient Active Problem List   Diagnosis     Late prenatal care affecting pregnancy in second trimester     Language barrier, cultural differences     Need for varicella vaccine     Tobacco abuse     Supervision of high risk pregnancy in third trimester       Sampson D Mo speaks Albanian so an  was used today.    Guidance:  post-partum care  signs of labor    Do you need help getting a car seat? No  Do you need help getting a breast pump? No    Objective  /63  Pulse 93  Temp 98  F (36.7  C)  Ht 4' 10.5\" (148.6 cm)  Wt 152 lb 9.6 oz (69.2 kg)  LMP 09/18/2016 (Exact Date)  BMI 31.35 kg/m2  No distress.  Gravid abdomen.  .  Fundal height 36 cm.  trace edema.  Cervix: not examined    Results  Blood type: B POS  Results for orders placed or performed in visit on 06/01/17   Clt. Grp B Strp Screen ([facility])   Result Value Ref Range    Group B Strep Antigen Negative Negative    Allergic To Penicillin No     Narrative    Test performed by:  [facility]  [address] [place] [address]  Intended Use:  The illumigene Group B Streptococcus (GBS) DNA amplification assay is a   qualitative in-vitro diagnostic for the detection of Streptococcus agalactiae   in enriched cultures obtained from vaginal/rectal swabs from antepartum women.    Results from this assay can be used as an aide in establishing the GBS   colonization status of antepartum women.  This assay does not diagnose or   monitor treatment for GBS infections.  The illumigene GBS assay is intended   for use in hospital, reference or state laboratory settings.  The device is   not intended for point-of-care use.  Methodology:  The illumigene GBS molecular assay is based on loop mediated amplification   technology, which uses specifically designed primers to Streptococcus   agalactiae to provide for specific and continuous isothermal DNA   amplification.  A by-product of this amplification is magnesium pyrophosphate,   which forms a white precipitate leading to a turbid reaction solution.    Change in sample absorbance indicates the presence of GBS and is reported as   positive.  The absence of target DNA results is no detectable change in   absorbance and is reported as negative.  The illumigene GBS assay specifically   targets a highly conserved 213 base pair sequence of the Streptococcus   agalactiae genome.       Assessment & Plan  31 year old  at 37w4d with DUNCAN 2017 based on LMP    Sampson was seen today for prenatal care.    Diagnoses and all orders for this visit:    Supervision of high risk pregnancy in third trimester  Doing well today no concerns.  Occasional contractions, but no more than 3 contractions per hour.  Only chewing betelnut every other day.  FHT reassuring today, FH consistent with dates, BP normal.  Delivery plan completed today, plans to deliver at [facility].  Did not check cervix today per patient's request   - Follow up in 1 week    Weight gain has been adequate.    Return to  clinic in 1 week.    Corine Ibarra PGY-2  I precepted today with Mary Muhammad MD.

## 2017-06-08 NOTE — PROGRESS NOTES
Preceptor attestation:  Patient seen and discussed with the resident.  Assessment and plan reviewed with resident and agreed upon.  Supervising physician: Mary Muhammad MD  [facility]

## 2017-06-08 NOTE — MR AVS SNAPSHOT
After Visit Summary   2017    Sampson Fonseca    MRN: [id number]           Patient Information     Date Of Birth          1985        Visit Information        Provider Department      2017 1:30 PM Corine Ibarra MD [facility]        Today's Diagnoses     Supervision of high risk pregnancy in third trimester    -  1      Care Instructions    Delivery Plan      I NEED A Puerto Rican       Take me to: [facility]  [address] [place], [place]  Phone number: [phone]     My name is: Sampson Fonseca  : 1985     My Doctor is: Corine Ibarra   Attending Physician: [facility]  Prenatal care was at [facility] [phone].     30yearold                                                                                                                                                      -para:   Estimated Date of Delivery: 2017  At 36w4d on 2017  Delivery type: Vaginal Delivery     Patient Active Problem List   Diagnosis     Late prenatal care affecting pregnancy in second trimester     Language barrier, cultural differences     Need for varicella vaccine     Tobacco abuse     Supervision of high risk pregnancy in third trimester      Allergies:No Known Allergies     Lab Results:  Blood Type: B POS  GBS: pending Date completed: 17           Rubella IgG   Date/Time Value Ref Range Status   2017 03:12 PM Immune Immune Final            HIV Antigen/Antibody   Date/Time Value Ref Range Status   2017 03:12 PM Negative Negative Final            Syphilis Screen Cascade   Date/Time Value Ref Range Status   2017 09:08 AM Non-Reactive Non-Reactive Final            Hepatitis B Surface Antigen   Date/Time Value Ref Range Status   2017 03:12 PM Negative Negative Final            Hemoglobin   Date Value Ref Range Status   2017 11.7 11.7 - 15.7 g/dL Final   2017 12.4 11.7 -  15.7 g/dL Final         Last cervical check: June 1, 2017 Cervical Dilation:  1 cm, 10%, -2, posterior. Cephalic position     Date of Flu shot: N/A  Date of TDAP: 4/6/17     Immunizations needed postpartum: Varicella     Who will be present at the delivery?  (if he is not at work), father, and sister in law     Do you have a ? No     What are your plans for pain control? Natural     Who will cut the umbilical cord?      Do you plan to breast feed?Yes     If your baby is a boy, would you like him circumcised?N/A     Name of baby's clinic: [facility]    Delivery meds: Okay with hepatitis b, erythromycin ointment, and vitamin K injection        Next Appointment is: 1 week            Follow-ups after your visit        Your next 10 appointments already scheduled     Jeanmarie 15, 2017  1:30 PM CDT   RETURN OB with Corine Ibarra MD   [facility] ([facility])    [address] [place] [address]   [phone]              Who to contact     Please call your clinic at [phone] to:    Ask questions about your health    Make or cancel appointments    Discuss your medicines    Learn about your test results    Speak to your doctor   If you have compliments or concerns about an experience at your clinic, or if you wish to file a complaint, please contact [facility] Patient Relations at [phone] or email us at [email or web address]         Additional Information About Your Visit        MyChart Information     [email or web address] is an electronic gateway that provides easy, online access to your medical records. With [email or web address], you can request a clinic appointment, read your test results, renew a prescription or communicate with your care team.     To sign up for 5to1t visit the website at [email or web address]   You will be asked to enter the access code listed below, as well as some personal information. Please follow the directions to create your  "username and password.     Your access code is: [email or web address]  Expires: 2017  3:18 PM     Your access code will  in 90 days. If you need help or a new code, please contact your [facility] or call [phone] for assistance.        Care EveryWhere ID     This is your Care EveryWhere ID. This could be used by other organizations to access your [place] medical records  [id number]        Your Vitals Were     Pulse Temperature Height Last Period BMI (Body Mass Index)       93 98  F (36.7  C) 4' 10.5\" (148.6 cm) 2016 (Exact Date) 31.35 kg/m2        Blood Pressure from Last 3 Encounters:   17 101/63   17 103/67   05/15/17 103/69    Weight from Last 3 Encounters:   17 152 lb 9.6 oz (69.2 kg)   17 150 lb 9.6 oz (68.3 kg)   05/15/17 150 lb 3.2 oz (68.1 kg)              Today, you had the following     No orders found for display       Primary Care Provider Office Phone # Fax #    Corine Alivia Ibarra [phone]       [place] [address]        Thank you!     Thank you for choosing [facility]  for your care. Our goal is always to provide you with excellent care. Hearing back from our patients is one way we can continue to improve our services. Please take a few minutes to complete the written survey that you may receive in the mail after your visit with us. Thank you!             Your Updated Medication List - Protect others around you: Learn how to safely use, store and throw away your medicines at www.disposemymeds.org.          This list is accurate as of: 17  2:00 PM.  Always use your most recent med list.                   Brand Name Dispense Instructions for use    acetaminophen 325 MG tablet    TYLENOL    100 tablet    Take 2 tablets (650 mg) by mouth every 4 hours as needed for mild pain       prenatal multivitamin  plus iron 27-0.8 MG Tabs per tablet     100 tablet    Take 1 " tablet by mouth daily

## 2017-06-08 NOTE — PATIENT INSTRUCTIONS
Delivery Plan      I NEED A Danish       Take me to: [facility]  [address] [place], [place]  Phone number: [phone]     My name is: Sampson Fonseca  : 1985     My Doctor is: Corine Ibarra   Attending Physician: [facility] Faculty  Prenatal care was at [facility] [phone].     32yearold                                                                                                                                                      -para:   Estimated Date of Delivery: 2017  At 36w4d on 2017  Delivery type: Vaginal Delivery     Patient Active Problem List   Diagnosis     Late prenatal care affecting pregnancy in second trimester     Language barrier, cultural differences     Need for varicella vaccine     Tobacco abuse     Supervision of high risk pregnancy in third trimester      Allergies:No Known Allergies     Lab Results:  Blood Type: B POS  GBS: pending Date completed: 17           Rubella IgG   Date/Time Value Ref Range Status   2017 03:12 PM Immune Immune Final            HIV Antigen/Antibody   Date/Time Value Ref Range Status   2017 03:12 PM Negative Negative Final            Syphilis Screen Cascade   Date/Time Value Ref Range Status   2017 09:08 AM Non-Reactive Non-Reactive Final            Hepatitis B Surface Antigen   Date/Time Value Ref Range Status   2017 03:12 PM Negative Negative Final            Hemoglobin   Date Value Ref Range Status   2017 11.7 11.7 - 15.7 g/dL Final   2017 12.4 11.7 - 15.7 g/dL Final         Last cervical check: 2017 Cervical Dilation:  1 cm, 10%, -2, posterior. Cephalic position     Date of Flu shot: N/A  Date of TDAP: 17     Immunizations needed postpartum: Varicella     Who will be present at the delivery?  (if he is not at work), father, and sister in law     Do you have a ? No     What are your plans for pain  control? Natural     Who will cut the umbilical cord?      Do you plan to breast feed?Yes     If your baby is a boy, would you like him circumcised?N/A     Name of baby's clinic: [facility]    Delivery meds: Okay with hepatitis b, erythromycin ointment, and vitamin K injection        Next Appointment is: 1 week

## 2017-06-15 ENCOUNTER — OFFICE VISIT (OUTPATIENT)
Dept: FAMILY MEDICINE | Facility: CLINIC | Age: 32
End: 2017-06-15

## 2017-06-15 DIAGNOSIS — O09.93 SUPERVISION OF HIGH RISK PREGNANCY IN THIRD TRIMESTER: Primary | ICD-10-CM

## 2017-06-15 NOTE — PATIENT INSTRUCTIONS
Follow up with one of Dr. Ibarra's colleagues next week, and with Dr. Ibarra the week after that (okay to double book Dr. Ibarra if necessary, or use 3/ spots on )    Delivery Plan       I NEED A Angolan        Take me to: [facility]  [address] [place], [place]  Phone number: [phone]      My name is: Sampson Fonseca  : 1985      My Doctor is: Corine Ibarra   Attending Physician: [facility]  Prenatal care was at [facility] [phone].      30yearold                                                                                                                                                      -para:   Estimated Date of Delivery: 2017  At 38w4d on Payal 15, 2017  Delivery type: Vaginal Delivery          Patient Active Problem List   Diagnosis     Late prenatal care affecting pregnancy in second trimester     Language barrier, cultural differences     Need for varicella vaccine     Tobacco abuse     Supervision of high risk pregnancy in third trimester       Allergies:No Known Allergies      Lab Results:  Blood Type: B POS  GBS: pending Date completed: 17                Rubella IgG   Date/Time Value Ref Range Status   2017 03:12 PM Immune Immune Final                 HIV Antigen/Antibody   Date/Time Value Ref Range Status   2017 03:12 PM Negative Negative Final                 Syphilis Screen Cascade   Date/Time Value Ref Range Status   2017 09:08 AM Non-Reactive Non-Reactive Final                 Hepatitis B Surface Antigen   Date/Time Value Ref Range Status   2017 03:12 PM Negative Negative Final                 Hemoglobin   Date Value Ref Range Status   2017 11.7 11.7 - 15.7 g/dL Final   2017 12.4 11.7 - 15.7 g/dL Final           Last cervical check: 2017 Cervical Dilation:  1 cm, 10%, -2, posterior. Cephalic position      Date of Flu shot: N/A  Date of TDAP:  4/6/17      Immunizations needed postpartum: Varicella      Who will be present at the delivery?  (if he is not at work), father, and sister in law      Do you have a ? No      What are your plans for pain control? Natural      Who will cut the umbilical cord?       Do you plan to breast feed?Yes      If your baby is a boy, would you like him circumcised?N/A      Name of baby's clinic: [facility]     Delivery meds: Okay with hepatitis b, erythromycin ointment, and vitamin K injection          Next Appointment is: 1 week

## 2017-06-15 NOTE — PROGRESS NOTES
"Subjective  Concerns: No concerns today, feeling well.      Headache present-last night, better today.   Changes in vision absent   Chest pain  absent   Dyspnea absent   Nausea absent without vomiting.   Abdominal pain absent.   Contractions present. Had occasional contractions last night, no more than 3 per hour  Dysuria absent   Vaginal Discharge absent   Vaginal bleeding absent.    Loss of Fluid absent     Extremity swelling present--trace  Fetal movement present.      Patient Active Problem List   Diagnosis     Late prenatal care affecting pregnancy in second trimester     Language barrier, cultural differences     Need for varicella vaccine     Tobacco abuse     Supervision of high risk pregnancy in third trimester       Sampson Fonseca speaks Vincentian so an  was used today.    Guidance:  post-partum care  signs of labor  pain control during labor    Has car seat    Objective  BP 94/63  Pulse 109  Temp 98.7  F (37.1  C) (Oral)  Ht 4' 10.75\" (149.2 cm)  Wt 151 lb 9.6 oz (68.8 kg)  LMP 09/18/2016 (Exact Date)  SpO2 96%  BMI 30.88 kg/m2  No distress.  Gravid abdomen.  .  Fundal height 39 cm.  no edema.  Cervix: not examined    Results  Blood type: B POS  Results for orders placed or performed in visit on 06/01/17   Clt. Grp B Strp Screen ([employer])   Result Value Ref Range    Group B Strep Antigen Negative Negative    Allergic To Penicillin No     Narrative    Test performed by:  [facility] [address] [place] [address]  Intended Use:  The [employer] Group B Streptococcus (GBS) DNA amplification assay is a   qualitative in-vitro diagnostic for the detection of Streptococcus agalactiae   in enriched cultures obtained from vaginal/rectal swabs from antepartum women.    Results from this assay can be used as an aide in establishing the GBS   colonization status of antepartum women.  This assay does not diagnose or   monitor treatment for GBS infections.  The illumigene GBS " assay is intended   for use in hospital, reference or state laboratory settings.  The device is   not intended for point-of-care use.  Methodology:  The illumigene GBS molecular assay is based on loop mediated amplification   technology, which uses specifically designed primers to Streptococcus   agalactiae to provide for specific and continuous isothermal DNA   amplification.  A by-product of this amplification is magnesium pyrophosphate,   which forms a white precipitate leading to a turbid reaction solution.    Change in sample absorbance indicates the presence of GBS and is reported as   positive.  The absence of target DNA results is no detectable change in   absorbance and is reported as negative.  The illumigene GBS assay specifically   targets a highly conserved 213 base pair sequence of the Streptococcus   agalactiae genome.       Assessment & Plan  31 year old  at 38w4d with DUNCAN 2017 based on LMP    Sampson was seen today for prenatal care and headache.    Diagnoses and all orders for this visit:    Supervision of high risk pregnancy in third trimester  Patient is doing well today, no concerns.  She is high risk for late prenatal care, language barrier, and tobacco use (betelnut) during pregnancy.  Her pregnancy has otherwise been uncomplicated.  FH consistent with dates, FHT reassuring today.  Did not check cervix per patient's request. BP within normal limits.  Delivery plan updated today.  Will see one of my colleagues next week since I will be on night float.  If patient is still pregnant in 2 weeks, I will see her and discuss induction plan.    Patient also saw Dr. Brown today for lifestyle clinic regarding betelnut use.    Return to clinic in 1 week.    Corine Ibarra PGY-2  I precepted today with Mary Muhammad MD.

## 2017-06-15 NOTE — MR AVS SNAPSHOT
After Visit Summary   6/15/2017    Sampson Fonseca    MRN: [id number]           Patient Information     Date Of Birth          1985        Visit Information        Provider Department      6/15/2017 1:30 PM Corine Ibarra MD [facility]        Care Instructions    Follow up with one of Dr. Ibarra's colleagues next week, and with Dr. Ibarra the week after that (okay to double book Dr. Ibarra if necessary, or use 3/ spots on )    Delivery Plan       I NEED A Indian        Take me to: [facility]  [address] [place], [place]  Phone number: [phone]      My name is: Sampson Fonseca  : 1985      My Doctor is: Corine Ibarra   Attending Physician: [facility] Faculty  Prenatal care was at [facility] [phone].      30yearold                                                                                                                                                      -para:   Estimated Date of Delivery: 2017  At 38w4d on Payal 15, 2017  Delivery type: Vaginal Delivery          Patient Active Problem List   Diagnosis     Late prenatal care affecting pregnancy in second trimester     Language barrier, cultural differences     Need for varicella vaccine     Tobacco abuse     Supervision of high risk pregnancy in third trimester       Allergies:No Known Allergies      Lab Results:  Blood Type: B POS  GBS: pending Date completed: 17                Rubella IgG   Date/Time Value Ref Range Status   2017 03:12 PM Immune Immune Final                 HIV Antigen/Antibody   Date/Time Value Ref Range Status   2017 03:12 PM Negative Negative Final                 Syphilis Screen Cascade   Date/Time Value Ref Range Status   2017 09:08 AM Non-Reactive Non-Reactive Final                 Hepatitis B Surface Antigen   Date/Time Value Ref Range Status   2017 03:12 PM Negative Negative  Final                 Hemoglobin   Date Value Ref Range Status   05/01/2017 11.7 11.7 - 15.7 g/dL Final   03/20/2017 12.4 11.7 - 15.7 g/dL Final           Last cervical check: June 1, 2017 Cervical Dilation:  1 cm, 10%, -2, posterior. Cephalic position      Date of Flu shot: N/A  Date of TDAP: 4/6/17      Immunizations needed postpartum: Varicella      Who will be present at the delivery?  (if he is not at work), father, and sister in law      Do you have a ? No      What are your plans for pain control? Natural      Who will cut the umbilical cord?       Do you plan to breast feed?Yes      If your baby is a boy, would you like him circumcised?N/A      Name of baby's clinic: [facility]     Delivery meds: Okay with hepatitis b, erythromycin ointment, and vitamin K injection          Next Appointment is: 1 week            Follow-ups after your visit        Who to contact     Please call your clinic at [phone] to:    Ask questions about your health    Make or cancel appointments    Discuss your medicines    Learn about your test results    Speak to your doctor   If you have compliments or concerns about an experience at your clinic, or if you wish to file a complaint, please contact [facility] Patient Relations at [phone] or email us at [email or web address]         Additional Information About Your Visit        [employer] Information     [facility] is an electronic gateway that provides easy, online access to your medical records. With [facility], you can request a clinic appointment, read your test results, renew a prescription or communicate with your care team.     To sign up for [facility] visit the website at [email or web address]   You will be asked to enter the access code listed below, as well as some personal information. Please follow the directions to create your username and password.     Your access code is: [email or web address]  Expires: 6/18/2017   "3:18 PM     Your access code will  in 90 days. If you need help or a new code, please contact your [facility] or call [phone] for assistance.        Care EveryWhere ID     This is your Care EveryWhere ID. This could be used by other organizations to access your [facility] medical records  [id number]        Your Vitals Were     Pulse Temperature Height Last Period Pulse Oximetry BMI (Body Mass Index)    109 98.7  F (37.1  C) (Oral) 4' 10.75\" (149.2 cm) 2016 (Exact Date) 96% 30.88 kg/m2       Blood Pressure from Last 3 Encounters:   06/15/17 94/63   17 101/63   17 103/67    Weight from Last 3 Encounters:   06/15/17 151 lb 9.6 oz (68.8 kg)   17 152 lb 9.6 oz (69.2 kg)   17 150 lb 9.6 oz (68.3 kg)              Today, you had the following     No orders found for display       Primary Care Provider Office Phone # Fax #    Corine Alivia Ibarra [phone]       [place] [address]        Thank you!     Thank you for choosing [facility]  for your care. Our goal is always to provide you with excellent care. Hearing back from our patients is one way we can continue to improve our services. Please take a few minutes to complete the written survey that you may receive in the mail after your visit with us. Thank you!             Your Updated Medication List - Protect others around you: Learn how to safely use, store and throw away your medicines at www.disposemymeds.org.          This list is accurate as of: 6/15/17  2:12 PM.  Always use your most recent med list.                   Brand Name Dispense Instructions for use    acetaminophen 325 MG tablet    TYLENOL    100 tablet    Take 2 tablets (650 mg) by mouth every 4 hours as needed for mild pain       prenatal multivitamin  plus iron 27-0.8 MG Tabs per tablet     100 tablet    Take 1 tablet by mouth daily         "

## 2017-06-15 NOTE — PROGRESS NOTES
Preceptor attestation:  Patient seen and discussed with the resident.  Assessment and plan reviewed with resident and agreed upon.  Supervising physician: Mary Muhammad MD  [facility]

## 2017-06-15 NOTE — PROGRESS NOTES
Primary Care Behavioral Health Consult Note    Meeting lasted: 7 minutes  Others present:  - Sanjiv Bravo    Identifying Information and Presenting Problem:    Dr. Ibarra requested behavioral health consultation for this patient regarding ongoing behavioral health support with reducing tobacco use.  The patient is a 31 year old Lithuanian female and agreed to be seen by behavioral health today.    Topics Discussed/Interventions Provided:       Has continued to use 1x/day, and continues to be proud to have sustained this reduction for the past 3 weeks. Not sure if she wants to quit all together and doesn't feel able to think beyond the end of the pregnancy. At the very least, wants to stay at 1x/day. Discussed tools that will support that success -- mostly using gum and candies to deal with cravings. Supported her continued behavior change.      Assessment: In the action phase of behavior change, and has maintained lower level of use for about 3-4 weeks. Will continue to support positive behavior change during rest of pregnancy, and following pregnancy.       Plan:      Plan to follow-up with Sampson at [facility] visits, and will provide outreach after she delivers to support continued behavior change.

## 2017-06-16 ENCOUNTER — TRANSFERRED RECORDS (OUTPATIENT)
Dept: HEALTH INFORMATION MANAGEMENT | Facility: CLINIC | Age: 32
End: 2017-06-16

## 2017-06-19 VITALS
WEIGHT: 151.6 LBS | HEART RATE: 109 BPM | HEIGHT: 59 IN | SYSTOLIC BLOOD PRESSURE: 94 MMHG | TEMPERATURE: 98.7 F | BODY MASS INDEX: 30.56 KG/M2 | DIASTOLIC BLOOD PRESSURE: 63 MMHG | OXYGEN SATURATION: 96 %

## 2017-06-19 PROBLEM — O09.32 LATE PRENATAL CARE AFFECTING PREGNANCY IN SECOND TRIMESTER: Status: RESOLVED | Noted: 2017-03-20 | Resolved: 2017-06-19

## 2017-06-19 PROBLEM — O09.93 SUPERVISION OF HIGH RISK PREGNANCY IN THIRD TRIMESTER: Status: RESOLVED | Noted: 2017-05-01 | Resolved: 2017-06-19

## 2017-08-31 ENCOUNTER — OFFICE VISIT (OUTPATIENT)
Dept: FAMILY MEDICINE | Facility: CLINIC | Age: 32
End: 2017-08-31

## 2017-08-31 VITALS
TEMPERATURE: 98.8 F | SYSTOLIC BLOOD PRESSURE: 123 MMHG | BODY MASS INDEX: 29.14 KG/M2 | DIASTOLIC BLOOD PRESSURE: 76 MMHG | WEIGHT: 138.8 LBS | HEIGHT: 58 IN | HEART RATE: 96 BPM

## 2017-08-31 DIAGNOSIS — Z30.017 ENCOUNTER FOR INITIAL PRESCRIPTION OF NEXPLANON: ICD-10-CM

## 2017-08-31 DIAGNOSIS — Z23 NEED FOR VACCINATION: Primary | ICD-10-CM

## 2017-08-31 LAB — HCG UR QL: NEGATIVE

## 2017-08-31 ASSESSMENT — PATIENT HEALTH QUESTIONNAIRE - PHQ9: SUM OF ALL RESPONSES TO PHQ QUESTIONS 1-9: 0

## 2017-08-31 NOTE — PROGRESS NOTES
HPI-Post Partum Visit -       Sampson Fonseca is a 32 year old female  with a pmh of chewing tobacco use who presents for a postpartum visit. Patient delivered a healthy baby girl by  over an intact perineum on 17 at [facility]. Delivery was uncomplicated. Prenatal history was significant for late prenatal care, language barrier and chewing tobacco use during pregnancy.     A [employer]  was used for this visit.       Patient Active Problem List   Diagnosis     Language barrier, cultural differences     Need for varicella vaccine     Tobacco abuse       Current Outpatient Prescriptions   Medication Sig Dispense Refill     Prenatal Vit-Fe Fumarate-FA (PRENATAL MULTIVITAMIN  PLUS IRON) 27-0.8 MG TABS per tablet Take 1 tablet by mouth daily 100 tablet 3     acetaminophen (TYLENOL) 325 MG tablet Take 2 tablets (650 mg) by mouth every 4 hours as needed for mild pain 100 tablet 0       Accompanying Signs & Symptoms:                        Breast feeding: breastfeeding going well, every 1-3 hrs, 8-12 times/24 hours.   Abdominal pain: no                       Bleeding since delivery: Yes, has been bleeding since delivery. The bleeding has slowly tapered to very minimal red discharge. She is not using a pad at all anymore, the bleeding is very minimal. She has no fevers, chills, vaginal discharge or abdominal pain. She says this is exactly how her bleeding pattern has been with her other deliveries and she is not concerned.        Contraception choice: Wants to do Nexplanon. Has had this before and had irregular periods, but it didn't bother her and she wants to get it again. Does not want to hear about other contraception options.        Patient has not had intercourse since delivery        Last PAP: 3/20/2017 - normal, HPV negative.  Pt screened for postpartum depression and complaints are: NEGATIVE         PHQ9= 0    Receiving dental care No - recommend yearly visits.  Calcium intake  "is adequate     Not on File         Review of Systems:   CONSTITUTIONAL: no fatigue, no unexpected change in weight  SKIN: no worrisome rashes or lesions  EYES: no acute vision problems or changes  ENT: no ear problems, no mouth problems, no throat problems  RESP: no significant cough, no shortness of breath  CV: no chest pain, no palpitations, no new or worsening peripheral edema  GI: no nausea, no vomiting, no constipation, no diarrhea  : no frequency, no dysuria, no hematuria. +vaginal bleeding.  NEURO: no weakness, no dizziness, no headaches  ENDOCRINE: no temperature intolerance, no skin/hair changes  PSYCHIATRIC: NEGATIVE for changes in mood or trouble with sleep            Physical Exam:     Vitals:    08/31/17 1118   BP: 123/76   BP Location: Left arm   Patient Position: Sitting   Cuff Size: Adult Regular   Pulse: 96   Temp: 98.8  F (37.1  C)   TempSrc: Oral   Weight: 138 lb 12.8 oz (63 kg)   Height: 4' 10.25\" (148 cm)       GENERAL: healthy, alert, well nourished, well hydrated, no distress  HENT: ear canals- normal; TMs- normal; Nose- normal; Mouth- no ulcers, no lesions  NECK: no tenderness, no adenopathy, no asymmetry, no masses, no stiffness; thyroid- normal to palpation  RESP: lungs clear to auscultation - no rales, no rhonchi, no wheezes  CV: regular rates and rhythm, normal S1 S2, no S3 or S4 and no murmur, no click or rub -  ABDOMEN: soft, no tenderness, no  hepatosplenomegaly, no masses, normal bowel sounds      Office Visit on 06/01/2017   Component Date Value Ref Range Status     Group B Strep Antigen 06/01/2017 Negative  Negative Final     Allergic To Penicillin 06/01/2017 No   Final       Assessment and Plan   Sampson was seen today for post partum exam.    Diagnoses and all orders for this visit:    Need for vaccination  -     ADMIN VACCINE, INITIAL  -     ADMIN VACCINE, EACH ADDITIONAL  -     HEPATITIS B VACCINE,ADULT,IM  -     HEPATITIS A VACCINE ADULT IM    Contraception: Desires nexplanon " insertion. She is >6 weeks out from delivery, so will need HCG today and repeat in 2 weeks. She will return in 2 weeks for repeat HCG and nexplanon insertion. I do not think her vaginal bleeding is a contraindication, but will assess again at her follow up visit and if it has worsened we may need to consider uterine ultrasound before insertion. She will abstain from intercourse or use condoms in the interim.    Vaginal Bleeding: She declines vaginal exam today. She is not concerned about this bleeding. It is likely just her regular pattern, I have very low suspicion for retained products. No fevers, chills or abdominal pain. Will have her let us know if it continue over the next 4-6 weeks and consider ultrasound if this is the case.    -Pap not indicated    Options for treatment and follow-up care were reviewed with the patient and/or guardian. Sampson QUACH Mo and/or guardian engaged in the decision making process and verbalized understanding of the options discussed and agreed with the final plan.    Jessica Palma, DO

## 2017-08-31 NOTE — PROGRESS NOTES
Preceptor attestation:  Patient seen and discussed with the resident. Assessment and plan reviewed with resident and agreed upon.  Supervising physician: Shimon Carroll  [facility]

## 2017-08-31 NOTE — MR AVS SNAPSHOT
After Visit Summary   2017    Sampson Fonseca    MRN: [id number]           Patient Information     Date Of Birth          1985        Visit Information        Provider Department      2017 11:00 AM Jessica Palma DO [facility]        Today's Diagnoses     Need for vaccination    -  1    Encounter for initial prescription of Nexplanon          Care Instructions    Return to clinic in 2 weeks for nexplanon insertion. Abstain from sexual intercourse during this time or use condoms.           Follow-ups after your visit        Who to contact     Please call your clinic at [phone] to:    Ask questions about your health    Make or cancel appointments    Discuss your medicines    Learn about your test results    Speak to your doctor   If you have compliments or concerns about an experience at your clinic, or if you wish to file a complaint, please contact [facility] Patient Relations at [phone] or email us at [email or web address]         Additional Information About Your Visit        MyChart Information     [employer] is an electronic gateway that provides easy, online access to your medical records. With [employer], you can request a clinic appointment, read your test results, renew a prescription or communicate with your care team.     To sign up for [employer] visit the website at [email or web address]   You will be asked to enter the access code listed below, as well as some personal information. Please follow the directions to create your username and password.     Your access code is: [email or web address]  Expires: 2017 11:45 AM     Your access code will  in 90 days. If you need help or a new code, please contact your [facility] or call [phone] for assistance.        Care EveryWhere ID     This is your Care EveryWhere ID. This could be used by other organizations to access your [facility]  "records  [id number]        Your Vitals Were     Pulse Temperature Height Last Period BMI (Body Mass Index)       96 98.8  F (37.1  C) (Oral) 4' 10.25\" (148 cm) 08/24/2017 28.76 kg/m2        Blood Pressure from Last 3 Encounters:   08/31/17 123/76   06/15/17 94/63   06/08/17 101/63    Weight from Last 3 Encounters:   08/31/17 138 lb 12.8 oz (63 kg)   06/15/17 151 lb 9.6 oz (68.8 kg)   06/08/17 152 lb 9.6 oz (69.2 kg)              We Performed the Following     ADMIN VACCINE, EACH ADDITIONAL     ADMIN VACCINE, INITIAL     HCG Qualitative Urine (UPT)  ([facility])     HEPATITIS A VACCINE ADULT IM     HEPATITIS B VACCINE,ADULT,IM        Primary Care Provider Office Phone # Fax #    Corine Alivia Ibrara [phone]       [place] [address]        Equal Access to Services     GAURAV BOBO AH: Hadii aad ku hadasho Soomaali, waaxda luqadaha, qaybta kaalmada adeegyada, waxay idiin haypauln jcarlos kearney . So [facility] [phone].    ATENCIÓN: Si habla español, tiene a devi disposición servicios gratuitos de asistencia lingüística. Llame al [phone].    We comply with applicable federal civil rights laws and [place] laws. We do not discriminate on the basis of race, color, national origin, age, disability sex, sexual orientation or gender identity.            Thank you!     Thank you for choosing [facility]  for your care. Our goal is always to provide you with excellent care. Hearing back from our patients is one way we can continue to improve our services. Please take a few minutes to complete the written survey that you may receive in the mail after your visit with us. Thank you!             Your Updated Medication List - Protect others around you: Learn how to safely use, store and throw away your medicines at www.disposemymeds.org.          This list is accurate as of: 8/31/17 11:46 AM.  Always use your most recent med list.                   Brand Name " Dispense Instructions for use Diagnosis    acetaminophen 325 MG tablet    TYLENOL    100 tablet    Take 2 tablets (650 mg) by mouth every 4 hours as needed for mild pain    Supervision of other high risk pregnancies, third trimester       prenatal multivitamin plus iron 27-0.8 MG Tabs per tablet     100 tablet    Take 1 tablet by mouth daily    Supervision of other high risk pregnancies, third trimester

## 2017-08-31 NOTE — PATIENT INSTRUCTIONS
Return to clinic in 2 weeks for nexplanon insertion. Abstain from sexual intercourse during this time or use condoms.

## 2017-10-19 ENCOUNTER — OFFICE VISIT (OUTPATIENT)
Dept: FAMILY MEDICINE | Facility: CLINIC | Age: 32
End: 2017-10-19

## 2017-10-19 VITALS
TEMPERATURE: 98.2 F | WEIGHT: 138 LBS | RESPIRATION RATE: 16 BRPM | BODY MASS INDEX: 28.59 KG/M2 | DIASTOLIC BLOOD PRESSURE: 77 MMHG | SYSTOLIC BLOOD PRESSURE: 115 MMHG | OXYGEN SATURATION: 100 % | HEART RATE: 85 BPM

## 2017-10-19 DIAGNOSIS — Z23 NEED FOR PROPHYLACTIC VACCINATION AND INOCULATION AGAINST INFLUENZA: ICD-10-CM

## 2017-10-19 DIAGNOSIS — Z30.09 ENCOUNTER FOR OTHER GENERAL COUNSELING OR ADVICE ON CONTRACEPTION: Primary | ICD-10-CM

## 2017-10-19 LAB — HCG UR QL: NEGATIVE

## 2017-10-19 NOTE — PROGRESS NOTES
SUBJECTIVE       Sampson Fonseca is a 32 year old  female with a PMH significant for:     Patient Active Problem List   Diagnosis     Language barrier, cultural differences     Need for varicella vaccine     Tobacco abuse     She presents for discussion regarding contraception.    At patient's post partum visit back in August, patient had stated that she would like to have nexplanon for contraception.  She had a negative UPT, but has not been seen since then.    Today, she presents to discuss contraception again. She had been without insurance for a couple months, which is why she didn't come back until now.    LMP was 10/3/2017.  No concerns with her periods.    PMH, Medications and Allergies were reviewed and updated as needed.    ROS as above        OBJECTIVE     Vitals:    10/19/17 1417   BP: 115/77   Pulse: 85   Resp: 16   Temp: 98.2  F (36.8  C)   TempSrc: Oral   SpO2: 100%   Weight: 138 lb (62.6 kg)     Body mass index is 28.59 kg/(m^2).    General: Alert, appropriate, and cooperative.  Appears stated age.  In no acute distress  HEENT:  Atraumatic.  Pupils equal, round, and reactive bilaterally.  Extraocular movements intact.  Mouth shows moist mucous membranes.    Neck:  Supple.  No adenopathy.    CV:  Regular rhythm and rate.  No murmurs, rubs, or gallops.  Lungs:  Clear to auscultation bilaterally.  No wheezes, rhonchi, or rales.  Abd:  Soft, non-distended, non-tender.  Bowel sounds present.  No masses or organomegaly to palpation.  Ext:  No lower extremity edema.  Skin:  No obvious rashes, jaundice, or suspicious lesions.      Results for orders placed or performed in visit on 10/19/17 (from the past 24 hour(s))   HCG Qualitative Urine (UPT)  ([facility])   Result Value Ref Range    HCG Qual Urine NEGATIVE Negative       ASSESSMENT AND PLAN     (Z30.09) Encounter for other general counseling or advice on contraception  (primary encounter diagnosis)  Comment: Patient would like to have nexplanon for  contraception.  UPT is negative today.  Patient advised to schedule nexplanon insertion appointment in 2 weeks, and she will abstain from intercourse during that time (or use condoms every time).  Plan: Follow up in 2 weeks for nexplanon insertion.      RTC in 2 weeks for follow up of nexplanon insertion.  Staffed with Dr. Muhammad.    Corine Ibarra MD ([place]) PGY-3  [facility]  10/19/2017

## 2017-10-19 NOTE — PROGRESS NOTES
Preceptor attestation:  Patient seen and discussed with the resident.  Assessment and plan reviewed with resident and agreed upon.  Supervising physician: Mary Muhammad MD  [facility]

## 2017-10-19 NOTE — NURSING NOTE
"Injectable Influenza Immunization Documentation    1.  Has the patient received the information for the injectable influenza vaccine? YES     2. Is the patient 6 months of age or older? YES     3. Does the patient have any of the following contraindications?         Severe allergy to eggs? No     Severe allergic reaction to previous influenza vaccines? No   Severe allergy to latex? No       History of Guillain-Newnan syndrome? No     Currently have a temperature greater than 100.4F? No        4.  Severely egg allergic patients should have flu vaccine eligibility assessed by an MD, RN, or pharmacist, and those who received flu vaccine should be observed for 15 min by an MD, RN, Pharmacist, Medical Technician, or member of clinic staff.\": YES    5. Latex-allergic patients should be given latex-free influenza vaccine Yes. Please reference the Vaccine latex table to determine if your clinic s product is latex-containing.       Vaccination given by Elva Swanson CMA        "

## 2017-10-19 NOTE — PATIENT INSTRUCTIONS
For nexplanon:  1. Schedule 40 minute appointment for insertion in 2 weeks  2. Avoid intercourse or use condoms every time

## 2017-11-10 ENCOUNTER — OFFICE VISIT (OUTPATIENT)
Dept: FAMILY MEDICINE | Facility: CLINIC | Age: 32
End: 2017-11-10

## 2017-11-10 VITALS
HEART RATE: 91 BPM | BODY MASS INDEX: 27.66 KG/M2 | SYSTOLIC BLOOD PRESSURE: 113 MMHG | DIASTOLIC BLOOD PRESSURE: 78 MMHG | OXYGEN SATURATION: 100 % | HEIGHT: 59 IN | TEMPERATURE: 98.1 F | WEIGHT: 137.2 LBS

## 2017-11-10 DIAGNOSIS — Z30.017 NEXPLANON INSERTION: ICD-10-CM

## 2017-11-10 DIAGNOSIS — Z30.017 INSERTION OF IMPLANTABLE SUBDERMAL CONTRACEPTIVE: ICD-10-CM

## 2017-11-10 DIAGNOSIS — Z30.09 ENCOUNTER FOR OTHER GENERAL COUNSELING OR ADVICE ON CONTRACEPTION: Primary | ICD-10-CM

## 2017-11-10 LAB — HCG UR QL: NEGATIVE

## 2017-11-10 NOTE — MR AVS SNAPSHOT
After Visit Summary   11/10/2017    Sampson Fonseca    MRN: [id number]           Patient Information     Date Of Birth          1985        Visit Information        Provider Department      11/10/2017 8:20 AM Kenny Jones MD [facility]        Today's Diagnoses     Encounter for other general counseling or advice on contraception    -  1    Nexplanon insertion        Insertion of implantable subdermal contraceptive           Follow-ups after your visit        Who to contact     Please call your clinic at [phone] to:    Ask questions about your health    Make or cancel appointments    Discuss your medicines    Learn about your test results    Speak to your doctor   If you have compliments or concerns about an experience at your clinic, or if you wish to file a complaint, please contact [facility] Patient Relations at [phone] or email us at [email or web address]         Additional Information About Your Visit        MyChart Information     [employer] is an electronic gateway that provides easy, online access to your medical records. With [facility], you can request a clinic appointment, read your test results, renew a prescription or communicate with your care team.     To sign up for [employer] visit the website at [email or web address]   You will be asked to enter the access code listed below, as well as some personal information. Please follow the directions to create your username and password.     Your access code is: [email or web address]  Expires: 2017 10:45 AM     Your access code will  in 90 days. If you need help or a new code, please contact your [facility] or call [phone] for assistance.        Care EveryWhere ID     This is your Care EveryWhere ID. This could be used by other organizations to access your [facility] medical records  [id number]        Your Vitals Were     Pulse Temperature Height Pulse  "Oximetry BMI (Body Mass Index)       91 98.1  F (36.7  C) (Oral) 4' 10.75\" (149.2 cm) 100% 27.95 kg/m2        Blood Pressure from Last 3 Encounters:   11/10/17 113/78   10/19/17 115/77   08/31/17 123/76    Weight from Last 3 Encounters:   11/10/17 137 lb 3.2 oz (62.2 kg)   10/19/17 138 lb (62.6 kg)   08/31/17 138 lb 12.8 oz (63 kg)              We Performed the Following     ETONOGESTREL IMPLANT SYSTEM     HCG Qualitative Urine (UPT)  ([facility])     INSERTION NON-BIODEGRADABLE DRUG DELIVERY IMPLANT          Today's Medication Changes          These changes are accurate as of: 11/10/17 11:59 PM.  If you have any questions, ask your nurse or doctor.               Start taking these medicines.        Dose/Directions    etonogestrel 68 MG Impl   Commonly known as:  IMPLANON/NEXPLANON   Used for:  Nexplanon insertion, Insertion of implantable subdermal contraceptive   Started by:  Kenny Jones MD        Dose:  1 each   1 each (68 mg) by Subdermal route continuous   Refills:  0            Where to get your medicines      Some of these will need a paper prescription and others can be bought over the counter.  Ask your nurse if you have questions.     You don't need a prescription for these medications     etonogestrel 68 MG Impl                Primary Care Provider Office Phone # Fax #    Corine Alivia Ibarra [phone]       [place] [address]        Equal Access to Services     GAURAV BOBO AH: Hadii florentin ku hadasho Soomaali, waaxda luqadaha, qaybta kaalmada adeegyada, waxay ravin tucker. So [facility] [phone].    ATENCIÓN: Si habla español, tiene a devi disposición servicios gratuitos de asistencia lingüística. Llame al [phone].    We comply with applicable federal civil rights laws and [place] laws. We do not discriminate on the basis of race, color, national origin, age, disability, sex, sexual orientation, or gender identity.          "   Thank you!     Thank you for choosing [facility]  for your care. Our goal is always to provide you with excellent care. Hearing back from our patients is one way we can continue to improve our services. Please take a few minutes to complete the written survey that you may receive in the mail after your visit with us. Thank you!             Your Updated Medication List - Protect others around you: Learn how to safely use, store and throw away your medicines at www.disposemymeds.org.          This list is accurate as of: 11/10/17 11:59 PM.  Always use your most recent med list.                   Brand Name Dispense Instructions for use Diagnosis    acetaminophen 325 MG tablet    TYLENOL    100 tablet    Take 2 tablets (650 mg) by mouth every 4 hours as needed for mild pain    Supervision of other high risk pregnancies, third trimester       etonogestrel 68 MG Impl    IMPLANON/NEXPLANON     1 each (68 mg) by Subdermal route continuous    Nexplanon insertion, Insertion of implantable subdermal contraceptive       prenatal multivitamin plus iron 27-0.8 MG Tabs per tablet     100 tablet    Take 1 tablet by mouth daily    Supervision of other high risk pregnancies, third trimester

## 2017-11-12 NOTE — PROGRESS NOTES
Procedure Note-Nexplanon Insertion    Sampson Fonseca is a patient of Corine Dunaway here for placement of etonogestrel implant (Nexplanon)    Indication:Contraception    Consent: Affirmation of informed consent was signed and scanned into the medical record. Risks, benefits and alternatives were discussed. Patient's questions were elicited and answered.   Procedure safety checklist was completed:  Yes  Time Out (Pause for the Cause) completed: Yes    Labs: UPT:  Negative  LMP:  Nov 4,2017      Previous Contraception:  nexplanon  Counseling:  Pt. counseled on potential side effects of  Nexplanon, including unpredictable spotting/bleeding and plan for removal/replacement of Nexplanon in 3 years or less.    Preoperative Diagnosis: Desires effective contraception  Postoperative Diagnosis: etonogestrel implant in place  Skin prep Betadine  Anesthesia 1% lidocaine    Technique:   Patient was placed supine with LEFT arm exposed.  Zoey was made 8-10cm above medial epicondial and a guiding zoey 4 cm above the first.  Arm was prepped with betadine.Insertion point was anesthetized with 1% lidocaine 2mL. After stretching the skin with thumb and index finger around the insertion site.  Skin punctured with the tip of the needle inserted at 30 degrees and then lowered to horizontal position. While lifting the skin with the tip of the needle, slided the needle to it's full length. Applicator was then stabilized and the purple slider was unlocked by pushing it slightly down. Slider moved back completely until it stopped. Applicator was then removed.  Correct placement of the implant was confirmed by palpation in the patient's arm and visualizing the purple top of the obturator.  Insertion site was dressed with an adhesive covered by a pressure dressing.      User card and patient chart label filled out. User card  given to patient for record. Nexplanon added to medication list     Lot# [  ]see scanned sheet    EBL:  minimal  Complications:  No  Tolerance:  Pt tolerated procedure well and was in stable condition.         Follow up: Pt was instructed to call if bleeding, severe pain or foul smell.     Follow up in 2-4 weeks.  Resident: N/A  Faculty: Kenny Jones MD present for and completed this entire procedure.

## 2018-01-05 ENCOUNTER — OFFICE VISIT (OUTPATIENT)
Dept: FAMILY MEDICINE | Facility: CLINIC | Age: 33
End: 2018-01-05
Payer: COMMERCIAL

## 2018-01-05 VITALS
BODY MASS INDEX: 28.72 KG/M2 | OXYGEN SATURATION: 99 % | WEIGHT: 141 LBS | TEMPERATURE: 98.6 F | HEART RATE: 88 BPM | SYSTOLIC BLOOD PRESSURE: 116 MMHG | DIASTOLIC BLOOD PRESSURE: 80 MMHG

## 2018-01-05 DIAGNOSIS — G44.219 EPISODIC TENSION-TYPE HEADACHE, NOT INTRACTABLE: Primary | ICD-10-CM

## 2018-01-05 RX ORDER — IBUPROFEN 400 MG/1
400 TABLET, FILM COATED ORAL EVERY 6 HOURS PRN
Qty: 100 TABLET | Refills: 0 | Status: SHIPPED | OUTPATIENT
Start: 2018-01-05 | End: 2018-05-31

## 2018-01-05 NOTE — PROGRESS NOTES
Subjective   Sampson Fonseca is a 32 year old female with no significant past medical history who presents with complaints of head pain.    She explains that she has had pain that she believes originates from spots of some lump removals over a decade ago in [place].  She is not sure what the lumps were, because she cannot see them, but the doctor took them out.  The lungs have not returned, but at the sites of those removals she sometimes gets pain that then radiates throughout the whole head.  This pain comes and goes, coming a few times a week and persisting for a couple of hours.  She has not tried anything for this yet.  There is associated neck stiffness with it but no vision changes, photophobia, phonophobia, or nausea.  There is no associated head injury or trauma.  She had something like this when she was pregnant as well.  She denies any pregnancy now, a she currently has a Nexplanon that was placed 2 months ago.    Social: Non-smoker.     name: Sanjiv Bravo  Language: [facility]  Agency: [employer]  Phone number: [phone]    Objective   Vitals: /80  Pulse 88  Temp 98.6  F (37  C)  Wt 141 lb (64 kg)  LMP 01/01/2018 (Exact Date)  SpO2 99%  Breastfeeding? Yes  BMI 28.72 kg/m2  General: Pleasant. Young woman. No distress.  HEENT: Extraocular movements intact. Sclera non-injected. Pupils equal, round, and reactive to light. Neck with full range of motion and negative Spurling's bilaterally, but symmetric tightness and tenderness over posterior neck muscles originating from the occipital notch.    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  However, there is no evidence for return of the cysts, nor does the pain really localize to those areas.  One possibility is that these are related to the recently placed Nexplanon, though she does connect these temporally.  -- Regardless of etiology, we'll treat with heat,  stretches (demonstrated in office and printed out exercises), and ibuprofen intermittently PRN.    Return to clinic if symptoms fail to improve.

## 2018-01-05 NOTE — PATIENT INSTRUCTIONS
Tension Headaches  Tension headaches cause a dull, steady pain on both sides of the head and in the neck and the back of the head. The eyes may also feel tired. Tension headaches can be triggered by lack of sleep, poor posture, eyestrain, stress, and other factors.          To help prevent tension headaches:    Make sure your work area is properly set up to help you avoid neck strain and eyestrain.    Make sure that your eyeglass prescription is current and is appropriate for the work you do.    Learn techniques for relaxing and reducing emotional stress. These include deep breathing, progressive relaxation, and biofeedback.    Maintain a regular exercise regimen under the guidance of a doctor. This can help keep your neck and back flexible, strong, and relaxed.  To relieve the pain:    Use moist heat to relax the muscles. Soak in a hot bath or wrap a warm, moist towel around your neck.    Brush your scalp lightly with a soft hairbrush.    Give yourself a massage. Knead the muscles running from your shoulders up the back of your skull.    Use an ice pack. Apply this directly to the place where you feel pain.    Rest. Sleeping often helps relieve headache pain.    Drink plenty of fluids. Dehydration is another trigger for headaches.    Neck Tension Rehabilitation Exercises   You may do all of these exercises right away but avoid any movements that increase your pain.     Neck rotation with flexion:   Right: Turn your head to the right and clasp your hands behind your head. Let the weight of your arms pull your chin to the right side of your chest. Relax. Hold for a count of 15. Do this 3 times.   Left: Turn your head to the left and clasp your hands behind your head. Let the weight of your arms pull your chin to the left side of your chest. Relax. Hold for a count of 15. Do this 3 times.     Chin tuck: Place your fingertips on your chin and gently push your head straight back as if you  are trying to make a double chin. Keep looking forward as your head moves back. Hold 5 seconds and repeat 5 times.     Scalene stretch: This stretches the neck muscles that attach to your ribs. Sitting in an upright position, clasp both hands behind your back, lower your left shoulder, and tilt your head toward the right. Hold this position for 15 to 30 seconds and then come back to the starting position. Lower your right shoulder and tilt your head toward the left until you feel a stretch. Hold for 15 to 30 seconds. Repeat 3 times on each side.     Neck rotation stretch   Right side: Rotate your neck by looking over your right shoulder. Lift your right hand and place your palm on the left side of your chin. Push your chin with your palm toward your right shoulder. Hold for a count of 10. Do this 3 times.   Left side: Rotate your neck by looking over your left shoulder. Lift your left hand and place your palm on the right side of your chin. Push your chin with your palm toward your left shoulder. Hold for a count of 10. Do this 3 times.     Scapular squeeze: While sitting or standing with your arms by your sides, squeeze your shoulder blades together and hold for 5 seconds. Do 3 sets of 10.     Thoracic extension: While sitting in a chair, clasp both arms behind your head. Gently arch backward and look up toward the ceiling. Repeat 10 times. Do this several times per day.                         1) Ice to neck 2-3 times per day  2)  avoid positions where the head is cocked forward like reading in bed.  3)Analgesics such as Tylenol 500mg, 2 tabs 3 times/day and/or ibuprofen.  4)Neck exercises, instruction sheet given*  5)Aerobic exercise program, this was strongly encouraged as one of the best ways to manage chronic neck and head ache  6) PT if not improving with the above therapies  7)medications for prevention can be helpful in some circumstances.   Thank you for coming to [facility].  **If you had lab testing  today and your results are reassuring or normal they will be be mailed to you within 7 days.   **If the lab tests need quick action we will call you with the results.  The phone number we will call with results is # [phone] (home) . If this is not the best number please call our clinic and change the number.  If you need any refills please call your pharmacy and they will contact us.  If you have any concerns about today's visit or wish to schedule another appointment please call our office during normal business hours [phone] (8-5:00 M-F)  If you have urgent medical concerns please call [phone] at any time of the day.  If you a medical emergency please call [phone]  Again thank you for choosing [facility] and please let us know how we can best partner with you to improve you and your family's health.

## 2018-01-05 NOTE — MR AVS SNAPSHOT
After Visit Summary   1/5/2018    Sampson Fonseca    MRN: [id number]           Patient Information     Date Of Birth          1985        Visit Information        Provider Department      1/5/2018 8:20 AM Mabel James MD [facility]        Today's Diagnoses     Episodic tension-type headache, not intractable    -  1      Care Instructions                                 Tension Headaches  Tension headaches cause a dull, steady pain on both sides of the head and in the neck and the back of the head. The eyes may also feel tired. Tension headaches can be triggered by lack of sleep, poor posture, eyestrain, stress, and other factors.          To help prevent tension headaches:    Make sure your work area is properly set up to help you avoid neck strain and eyestrain.    Make sure that your eyeglass prescription is current and is appropriate for the work you do.    Learn techniques for relaxing and reducing emotional stress. These include deep breathing, progressive relaxation, and biofeedback.    Maintain a regular exercise regimen under the guidance of a doctor. This can help keep your neck and back flexible, strong, and relaxed.  To relieve the pain:    Use moist heat to relax the muscles. Soak in a hot bath or wrap a warm, moist towel around your neck.    Brush your scalp lightly with a soft hairbrush.    Give yourself a massage. Knead the muscles running from your shoulders up the back of your skull.    Use an ice pack. Apply this directly to the place where you feel pain.    Rest. Sleeping often helps relieve headache pain.    Drink plenty of fluids. Dehydration is another trigger for headaches.    Neck Tension Rehabilitation Exercises   You may do all of these exercises right away but avoid any movements that increase your pain.     Neck rotation with flexion:   Right: Turn your head to the right and clasp your hands behind your head. Let the weight of your arms pull your chin to the right  side of your chest. Relax. Hold for a count of 15. Do this 3 times.   Left: Turn your head to the left and clasp your hands behind your head. Let the weight of your arms pull your chin to the left side of your chest. Relax. Hold for a count of 15. Do this 3 times.     Chin tuck: Place your fingertips on your chin and gently push your head straight back as if you are trying to make a double chin. Keep looking forward as your head moves back. Hold 5 seconds and repeat 5 times.     Scalene stretch: This stretches the neck muscles that attach to your ribs. Sitting in an upright position, clasp both hands behind your back, lower your left shoulder, and tilt your head toward the right. Hold this position for 15 to 30 seconds and then come back to the starting position. Lower your right shoulder and tilt your head toward the left until you feel a stretch. Hold for 15 to 30 seconds. Repeat 3 times on each side.     Neck rotation stretch   Right side: Rotate your neck by looking over your right shoulder. Lift your right hand and place your palm on the left side of your chin. Push your chin with your palm toward your right shoulder. Hold for a count of 10. Do this 3 times.   Left side: Rotate your neck by looking over your left shoulder. Lift your left hand and place your palm on the right side of your chin. Push your chin with your palm toward your left shoulder. Hold for a count of 10. Do this 3 times.     Scapular squeeze: While sitting or standing with your arms by your sides, squeeze your shoulder blades together and hold for 5 seconds. Do 3 sets of 10.     Thoracic extension: While sitting in a chair, clasp both arms behind your head. Gently arch backward and look up toward the ceiling. Repeat 10 times. Do this several times per day.                         1) Ice to neck 2-3 times per day  2)  avoid positions where the head is cocked forward like reading in bed.  3)Analgesics such as Tylenol 500mg, 2 tabs 3 times/day  and/or ibuprofen.  4)Neck exercises, instruction sheet given*  5)Aerobic exercise program, this was strongly encouraged as one of the best ways to manage chronic neck and head ache  6) PT if not improving with the above therapies  7)medications for prevention can be helpful in some circumstances.   Thank you for coming to [facility].  **If you had lab testing today and your results are reassuring or normal they will be be mailed to you within 7 days.   **If the lab tests need quick action we will call you with the results.  The phone number we will call with results is # [phone] (home) . If this is not the best number please call our clinic and change the number.  If you need any refills please call your pharmacy and they will contact us.  If you have any concerns about today's visit or wish to schedule another appointment please call our office during normal business hours [phone] (8-5:00 M-F)  If you have urgent medical concerns please call [phone] at any time of the day.  If you a medical emergency please call [phone]  Again thank you for choosing [facility] and please let us know how we can best partner with you to improve you and your family's health.                Follow-ups after your visit        Who to contact     Please call your clinic at [phone] to:    Ask questions about your health    Make or cancel appointments    Discuss your medicines    Learn about your test results    Speak to your doctor   If you have compliments or concerns about an experience at your clinic, or if you wish to file a complaint, please contact [facility] Patient Relations at [phone] or email us at [email or web address]         Additional Information About Your Visit        [employer] Information     Al[employer] is an electronic gateway that provides easy, online access to your medical records. With Al[employer], you can request a clinic appointment, read your test results,  renew a prescription or communicate with your care team.     To sign up for MyChart visit the website at [email or web address]   You will be asked to enter the access code listed below, as well as some personal information. Please follow the directions to create your username and password.     Your access code is: [email or web address]  Expires: 2018  8:58 AM     Your access code will  in 90 days. If you need help or a new code, please contact your [facility] or call [phone] for assistance.        Care EveryWhere ID     This is your Care EveryWhere ID. This could be used by other organizations to access your [place] medical records  [id number]        Your Vitals Were     Pulse Temperature Last Period Pulse Oximetry Breastfeeding? BMI (Body Mass Index)    88 98.6  F (37  C) 2018 (Exact Date) 99% Yes 28.72 kg/m2       Blood Pressure from Last 3 Encounters:   18 116/80   11/10/17 113/78   10/19/17 115/77    Weight from Last 3 Encounters:   18 141 lb (64 kg)   11/10/17 137 lb 3.2 oz (62.2 kg)   10/19/17 138 lb (62.6 kg)              Today, you had the following     No orders found for display         Today's Medication Changes          These changes are accurate as of: 18  9:02 AM.  If you have any questions, ask your nurse or doctor.               Start taking these medicines.        Dose/Directions    ibuprofen 400 MG tablet   Commonly known as:  ADVIL/MOTRIN   Used for:  Episodic tension-type headache, not intractable   Started by:  Mabel James MD        Dose:  400 mg   Take 1 tablet (400 mg) by mouth every 6 hours as needed (headaches)   Quantity:  100 tablet   Refills:  0            Where to get your medicines      [facility] [place], [place] - [address], [place] [address]     Phone:  [phone]     ibuprofen 400 MG tablet                Primary Care Provider Office  Phone # Fax Sofia Pool Alivia Ibarra [phone]       [address] [address]        Equal Access to Services     GAURAV BOBO : Ariela Bermudez, wasalvadorda luqjacquelynha, qaybta kakeyonada alyse, coral scottin hayaasmita sonijessicaberta tucker. So [facility] [phone].    ATENCIÓN: Si habla español, tiene a devi disposición servicios gratuitos de asistencia lingüística. Llame al [phone].    We comply with applicable federal civil rights laws and [place] laws. We do not discriminate on the basis of race, color, national origin, age, disability, sex, sexual orientation, or gender identity.            Thank you!     Thank you for choosing [facility]  for your care. Our goal is always to provide you with excellent care. Hearing back from our patients is one way we can continue to improve our services. Please take a few minutes to complete the written survey that you may receive in the mail after your visit with us. Thank you!             Your Updated Medication List - Protect others around you: Learn how to safely use, store and throw away your medicines at www.disposemymeds.org.          This list is accurate as of: 1/5/18  9:02 AM.  Always use your most recent med list.                   Brand Name Dispense Instructions for use Diagnosis    acetaminophen 325 MG tablet    TYLENOL    100 tablet    Take 2 tablets (650 mg) by mouth every 4 hours as needed for mild pain    Supervision of other high risk pregnancies, third trimester       etonogestrel 68 MG Impl    IMPLANON/NEXPLANON     1 each (68 mg) by Subdermal route continuous    Nexplanon insertion, Insertion of implantable subdermal contraceptive       ibuprofen 400 MG tablet    ADVIL/MOTRIN    100 tablet    Take 1 tablet (400 mg) by mouth every 6 hours as needed (headaches)    Episodic tension-type headache, not intractable       prenatal multivitamin plus iron 27-0.8 MG Tabs per tablet     100 tablet     Take 1 tablet by mouth daily    Supervision of other high risk pregnancies, third trimester

## 2018-05-31 ENCOUNTER — OFFICE VISIT (OUTPATIENT)
Dept: FAMILY MEDICINE | Facility: CLINIC | Age: 33
End: 2018-05-31
Payer: COMMERCIAL

## 2018-05-31 VITALS
RESPIRATION RATE: 16 BRPM | TEMPERATURE: 98.8 F | SYSTOLIC BLOOD PRESSURE: 121 MMHG | DIASTOLIC BLOOD PRESSURE: 83 MMHG | OXYGEN SATURATION: 99 % | BODY MASS INDEX: 28.4 KG/M2 | WEIGHT: 139.4 LBS | HEART RATE: 90 BPM

## 2018-05-31 DIAGNOSIS — M79.675 GREAT TOE PAIN, LEFT: Primary | ICD-10-CM

## 2018-05-31 DIAGNOSIS — G44.219 EPISODIC TENSION-TYPE HEADACHE, NOT INTRACTABLE: ICD-10-CM

## 2018-05-31 PROBLEM — K03.6 BETEL DEPOSIT ON TEETH: Status: ACTIVE | Noted: 2018-05-31

## 2018-05-31 LAB
ERYTHROCYTE [SEDIMENTATION RATE] IN BLOOD: 20 MM/HR (ref 0–20)
URATE SERPL-MCNC: 3.9 MG/DL (ref 2–7.5)

## 2018-05-31 RX ORDER — IBUPROFEN 600 MG/1
600 TABLET, FILM COATED ORAL EVERY 6 HOURS PRN
Qty: 60 TABLET | Refills: 1 | Status: SHIPPED | OUTPATIENT
Start: 2018-05-31 | End: 2019-04-08

## 2018-05-31 NOTE — NURSING NOTE
Due to patient being non-English speaking/uses sign language, an  was used for this visit. Only for face-to-face interpretation by an external agency, date and length of interpretation can be found on the scanned worksheet.     name: Sanjiv Bravo  Agency: [employer]  Language: Jose Manuel   Telephone number: [phone]  Type of interpretation: Face-to-face, spoken

## 2018-05-31 NOTE — LETTER
June 7, 2018      Sampson Fonseca  [address]  [place] [address]        Dear Clarissa Braden, both your lab results and x-ray results are normal: There is no sign of gout.  Hopefully your pain is getting better on the medication.  Please follow-up if you are still having pain, Dr. Shimon Dexter    If you have any questions, please call the clinic to make an appointment.    Sincerely,    Shimon Dexter MD

## 2018-05-31 NOTE — PROGRESS NOTES
This is a 32-year-old female who attends today with a Jordanian .  Her complaint is 3 days of left first MTP joint pain.  She says she woke up with it.  She denies any injury or trauma.  She is wearing flat sandals.  She has no symptoms in the other foot.  She has never had this symptom before.  I inquired about a family history of gout and she believes her father has this as well as generalized arthritis.  She herself does not consume excessive amounts of meat or other proteins.    She is otherwise well and has no other concerns.  She has an infant of less than 1 year.  She has tried some topical traditional remedies but no oral medications.    Objective:  /83 (BP Location: Left arm, Patient Position: Sitting, Cuff Size: Adult Regular)  Pulse 90  Temp 98.8  F (37.1  C) (Oral)  Resp 16  Wt 139 lb 6.4 oz (63.2 kg)  LMP 05/22/2018 (Exact Date)  SpO2 99%  Breastfeeding? Yes  BMI 28.4 kg/m2  Her vitals are good, she is overweight.  Exam of her left foot reveals tenderness on palpation of the left first MTP joint as well as on palpation of the left IP joint.  Compared to the right foot the left first MTP joint appears larger than its right counterpart.    I obtained an x-ray of the joint.  Personal review of this by me reveals no bony injury nor significant degenerative changes.    Sampson was seen today for musculoskeletal problem and medication reconciliation.    Diagnoses and all orders for this visit:    Great toe pain, left  -     Cancel: Erythrocyte Sedimentation Rate ([facility])  -     Uric Acid ([facility])  -     XR TOE LT G/E 2 VW  -     Erythrocyte Sed Rate ([facility])    Episodic tension-type headache, not intractable  -     ibuprofen (ADVIL/MOTRIN) 600 MG tablet; Take 1 tablet (600 mg) by mouth every 6 hours as needed for moderate pain      It is possible that this could reflect gout although her risks for this seem low.  Nonetheless we agreed to check labs and await external reading of the  x-ray.  In the meantime I recommended her to use better supportive footwear with arch support.  I prescribed ibuprofen 600 mg 3 times daily.  I have asked her to return in 1 week's time at which point we can review the results and see how she is responded to treatment.  She expressed agreement and understanding.

## 2018-05-31 NOTE — MR AVS SNAPSHOT
After Visit Summary   2018    Sampson Fonseca    MRN: [id number]           Patient Information     Date Of Birth          1985        Visit Information        Provider Department      2018 10:40 AM Shimon Dexter MD [facility]        Today's Diagnoses     Great toe pain, left    -  1    Episodic tension-type headache, not intractable          Care Instructions    Recommend wearing supportive shoes - such as running shoes / sneakers with arch support          Follow-ups after your visit        Follow-up notes from your care team     Return in about 1 week (around 2018), or if symptoms worsen or fail to improve.      Your next 10 appointments already scheduled     2018  8:00 AM CDT   Return Visit with Shimon Dexter MD   [facility] ([facility])    [address] [place] [address]   [phone]              Who to contact     Please call your clinic at [phone] to:    Ask questions about your health    Make or cancel appointments    Discuss your medicines    Learn about your test results    Speak to your doctor            Additional Information About Your Visit        MyChart Information     MyMosa is an electronic gateway that provides easy, online access to your medical records. With MyMosa, you can request a clinic appointment, read your test results, renew a prescription or communicate with your care team.     To sign up for MyMosa visit the website at [email or web address]   You will be asked to enter the access code listed below, as well as some personal information. Please follow the directions to create your username and password.     Your access code is: [email or web address]  Expires: 2018 12:59 PM     Your access code will  in 90 days. If you need help or a new code, please contact your [facility] or call [phone] for assistance.        Care EveryWhere ID     This is your Care EveryWhere ID. This could be used  by other organizations to access your [place] medical records  [id number]        Your Vitals Were     Pulse Temperature Respirations Last Period Pulse Oximetry Breastfeeding?    90 98.8  F (37.1  C) (Oral) 16 05/22/2018 (Exact Date) 99% Yes    BMI (Body Mass Index)                   28.4 kg/m2            Blood Pressure from Last 3 Encounters:   05/31/18 121/83   01/05/18 116/80   11/10/17 113/78    Weight from Last 3 Encounters:   05/31/18 139 lb 6.4 oz (63.2 kg)   01/05/18 141 lb (64 kg)   11/10/17 137 lb 3.2 oz (62.2 kg)              We Performed the Following     Erythrocyte Sed Rate ([facility])     Uric Acid ([facility])     XR TOE LT G/E 2 VW          Today's Medication Changes          These changes are accurate as of 5/31/18 12:59 PM.  If you have any questions, ask your nurse or doctor.               These medicines have changed or have updated prescriptions.        Dose/Directions    ibuprofen 600 MG tablet   Commonly known as:  ADVIL/MOTRIN   This may have changed:    - medication strength  - how much to take  - reasons to take this   Used for:  Episodic tension-type headache, not intractable   Changed by:  Shimon Dexter MD        Dose:  600 mg   Take 1 tablet (600 mg) by mouth every 6 hours as needed for moderate pain   Quantity:  60 tablet   Refills:  1         Stop taking these medicines if you haven't already. Please contact your care team if you have questions.     prenatal multivitamin plus iron 27-0.8 MG Tabs per tablet   Stopped by:  Shimon Dexter MD                Where to get your medicines      [facility] [place], [place] - [address], [place] [address]     Phone:  [phone]     ibuprofen 600 MG tablet                Primary Care Provider Office Phone # Fax #    Corine Alivia Ibarra [phone]       [address] [address]        Equal Access to Services     GAURAV  GAAR : Hadii florentin huang lashay Bermduez, waaxda luqadaha, qaybta kakeyonada azndraanamariagaurang, waxtello ravin haydeborah sonijessicaberta kearney . So [facility] [phone].    ATENCIÓN: Si habla español, tiene a devi disposición servicios gratuitos de asistencia lingüística. Llame al [phone].    We comply with applicable federal civil rights laws and [place] laws. We do not discriminate on the basis of race, color, national origin, age, disability, sex, sexual orientation, or gender identity.            Thank you!     Thank you for choosing [facility]  for your care. Our goal is always to provide you with excellent care. Hearing back from our patients is one way we can continue to improve our services. Please take a few minutes to complete the written survey that you may receive in the mail after your visit with us. Thank you!             Your Updated Medication List - Protect others around you: Learn how to safely use, store and throw away your medicines at www.disposemymeds.org.          This list is accurate as of 5/31/18 12:59 PM.  Always use your most recent med list.                   Brand Name Dispense Instructions for use Diagnosis    acetaminophen 325 MG tablet    TYLENOL    100 tablet    Take 2 tablets (650 mg) by mouth every 4 hours as needed for mild pain    Supervision of other high risk pregnancies, third trimester       etonogestrel 68 MG Impl    IMPLANON/NEXPLANON     1 each (68 mg) by Subdermal route continuous    Nexplanon insertion, Insertion of implantable subdermal contraceptive       ibuprofen 600 MG tablet    ADVIL/MOTRIN    60 tablet    Take 1 tablet (600 mg) by mouth every 6 hours as needed for moderate pain    Episodic tension-type headache, not intractable

## 2018-06-07 ENCOUNTER — OFFICE VISIT (OUTPATIENT)
Dept: FAMILY MEDICINE | Facility: CLINIC | Age: 33
End: 2018-06-07
Payer: COMMERCIAL

## 2018-06-07 VITALS
TEMPERATURE: 98 F | DIASTOLIC BLOOD PRESSURE: 62 MMHG | RESPIRATION RATE: 18 BRPM | HEIGHT: 59 IN | BODY MASS INDEX: 28.14 KG/M2 | SYSTOLIC BLOOD PRESSURE: 94 MMHG | HEART RATE: 59 BPM | WEIGHT: 139.6 LBS

## 2018-06-07 DIAGNOSIS — L72.3 SEBACEOUS CYST: ICD-10-CM

## 2018-06-07 DIAGNOSIS — M79.675 PAIN OF TOE OF LEFT FOOT: Primary | ICD-10-CM

## 2018-06-07 NOTE — MR AVS SNAPSHOT
After Visit Summary   6/7/2018    Sampson Fonseca    MRN: [id number]           Patient Information     Date Of Birth          1985        Visit Information        Provider Department      6/7/2018 8:00 AM Shimon Dexter MD [facility]        Today's Diagnoses     Pain of toe of left foot    -  1    Sebaceous cyst          Care Instructions    -Use Arch supports.    -The foot should get better.    -Keep using the Ibuprofen.        Results for orders placed or performed in visit on 05/31/18   Uric Acid ([facility])   Result Value Ref Range    Uric Acid 3.9 2.0 - 7.5 mg/dL    Narrative    Test performed by:  [facility] [address] [place] [address]   Erythrocyte Sed Rate ([facility])   Result Value Ref Range    Sed Rate 20 0 - 20 mm/hr    Narrative    Test performed by:  [facility] [address] [place] [address]         Epidermoid Cyst (Sebaceous Cyst), No Infection  An epidermoid cyst (sebaceous cyst) is a term that refers to 2 similar types of cysts: those found in the skin (epidermoid), and those found around hair follicles (pilar).  Some general facts about these cysts:    A cyst is a sac filled with material that is often cheesy, fatty, oily, or fibrous. The material in them can be thick (like cottage cheese) or liquid.    They form slowly under the skin, and can be found on most parts of the body. They are most often found in hairier areas like the scalp, face, upper back, and genitals.    You can usually move them slightly if you try.    They can be smaller than a pea or as large as a few inches.    They are usually not painful, unless they become inflamed or infected.    The area around the cyst may smell bad. If the cyst breaks open, the material inside it often smells bad too.  Causes  Epidermoid cysts are caused when skin (epidermal) cells move under the skin surface, or are covered over by it. These cells continue to multiply, like skin does normally.  They then form a wall around themselves (cyst) and secrete normal skin fluids (keratin). This may be developmental. But it often happens because of an injury to the skin.  Epidermoid cysts are often found around hair follicles. These follicles are like cysts, but they have openings. Normal lubricating oils for your hair are sent out through these openings. A cyst occurs when an opening becomes blocked or the site inflamed. This often occurs when there is damage to the hair follicles by a scrape or wound.    Pilar cysts are similar to epidermoid cysts. But they start from a different part of the hair follicle, and are more likely to be on the scalp.  Symptoms    Feeling a lump just beneath the skin    It may or may not be painful    The cyst may or may not smell bad    The cyst may become inflamed or red    The cyst may leak fluid or thick material  Home care  Epidermoid cysts often go away without any treatment. If your cyst doesn t go away, and it bothers you, it may be drained or removed. If the cyst drains on its own, it may return. Resist the temptation to squeeze, pop, stick a needle in it, or cut it open. This often leads to an infection and scarring. If it gets severely inflamed or infected, you should seek medical care. Be sure to clean the cyst area when bathing or showering. Watch for the signs of infection listed below.  Follow-up care  Follow up with your healthcare provider, or as advised.  When to seek medical advice  Call your healthcare provider right away if any of these occur:    Swelling, redness, or pain    Pus coming from the cyst  Date Last Reviewed: 8/1/2016 2000-2017 [employer]. [address] [place] [address], [place] [address]. All rights reserved. This information is not intended as a substitute for professional medical care. Always follow your healthcare professional's instructions.                Follow-ups after your visit        Who to contact     Please call your clinic at  "[phone] to:    Ask questions about your health    Make or cancel appointments    Discuss your medicines    Learn about your test results    Speak to your doctor            Additional Information About Your Visit        MyChart Information     [employer] is an electronic gateway that provides easy, online access to your medical records. With [employer], you can request a clinic appointment, read your test results, renew a prescription or communicate with your care team.     To sign up for [employer] visit the website at [email or web address]   You will be asked to enter the access code listed below, as well as some personal information. Please follow the directions to create your username and password.     Your access code is: [email or web address]  Expires: 2018 12:59 PM     Your access code will  in 90 days. If you need help or a new code, please contact your [facility] or call [phone] for assistance.        Care EveryWhere ID     This is your Care EveryWhere ID. This could be used by other organizations to access your [facility] medical records  [id number]        Your Vitals Were     Pulse Temperature Respirations Height Last Period BMI (Body Mass Index)    59 98  F (36.7  C) 18 4' 11\" (149.9 cm) 2018 (Exact Date) 28.2 kg/m2       Blood Pressure from Last 3 Encounters:   18 94/62   18 121/83   18 116/80    Weight from Last 3 Encounters:   18 139 lb 9.6 oz (63.3 kg)   18 139 lb 6.4 oz (63.2 kg)   18 141 lb (64 kg)              Today, you had the following     No orders found for display       Primary Care Provider Office Phone # Fax #    Corine Alivia Ibarra [phone]       [place] [id number]        Equal Access to Services     GARUAV BOBO AH: Ariela Bermudez, wasalvadorda luqadaha, qaybta kaalmacoral fu. So [facility] " [phone].    ATENCIÓN: Si carlito araujo, tiene a devi disposición servicios gratuitos de asistencia lingüística. Shilpi soni [phone].    We comply with applicable federal civil rights laws and [place] laws. We do not discriminate on the basis of race, color, national origin, age, disability, sex, sexual orientation, or gender identity.            Thank you!     Thank you for choosing [facility]  for your care. Our goal is always to provide you with excellent care. Hearing back from our patients is one way we can continue to improve our services. Please take a few minutes to complete the written survey that you may receive in the mail after your visit with us. Thank you!             Your Updated Medication List - Protect others around you: Learn how to safely use, store and throw away your medicines at www.disposemymeds.org.          This list is accurate as of 6/7/18  8:27 AM.  Always use your most recent med list.                   Brand Name Dispense Instructions for use Diagnosis    acetaminophen 325 MG tablet    TYLENOL    100 tablet    Take 2 tablets (650 mg) by mouth every 4 hours as needed for mild pain    Supervision of other high risk pregnancies, third trimester       etonogestrel 68 MG Impl    IMPLANON/NEXPLANON     1 each (68 mg) by Subdermal route continuous    Nexplanon insertion, Insertion of implantable subdermal contraceptive       ibuprofen 600 MG tablet    ADVIL/MOTRIN    60 tablet    Take 1 tablet (600 mg) by mouth every 6 hours as needed for moderate pain    Episodic tension-type headache, not intractable

## 2018-06-07 NOTE — PATIENT INSTRUCTIONS
-Use Arch supports.    -The foot should get better.    -Keep using the Ibuprofen.        Results for orders placed or performed in visit on 05/31/18   Uric Acid ([facility])   Result Value Ref Range    Uric Acid 3.9 2.0 - 7.5 mg/dL    Narrative    Test performed by:  [facility] [address] [place] [address]   Erythrocyte Sed Rate ([facility])   Result Value Ref Range    Sed Rate 20 0 - 20 mm/hr    Narrative    Test performed by:  [facility] [address] [place] [address]         Epidermoid Cyst (Sebaceous Cyst), No Infection  An epidermoid cyst (sebaceous cyst) is a term that refers to 2 similar types of cysts: those found in the skin (epidermoid), and those found around hair follicles (pilar).  Some general facts about these cysts:    A cyst is a sac filled with material that is often cheesy, fatty, oily, or fibrous. The material in them can be thick (like cottage cheese) or liquid.    They form slowly under the skin, and can be found on most parts of the body. They are most often found in hairier areas like the scalp, face, upper back, and genitals.    You can usually move them slightly if you try.    They can be smaller than a pea or as large as a few inches.    They are usually not painful, unless they become inflamed or infected.    The area around the cyst may smell bad. If the cyst breaks open, the material inside it often smells bad too.  Causes  Epidermoid cysts are caused when skin (epidermal) cells move under the skin surface, or are covered over by it. These cells continue to multiply, like skin does normally. They then form a wall around themselves (cyst) and secrete normal skin fluids (keratin). This may be developmental. But it often happens because of an injury to the skin.  Epidermoid cysts are often found around hair follicles. These follicles are like cysts, but they have openings. Normal lubricating oils for your hair are sent out through these openings.  A cyst occurs when an opening becomes blocked or the site inflamed. This often occurs when there is damage to the hair follicles by a scrape or wound.    Pilar cysts are similar to epidermoid cysts. But they start from a different part of the hair follicle, and are more likely to be on the scalp.  Symptoms    Feeling a lump just beneath the skin    It may or may not be painful    The cyst may or may not smell bad    The cyst may become inflamed or red    The cyst may leak fluid or thick material  Home care  Epidermoid cysts often go away without any treatment. If your cyst doesn t go away, and it bothers you, it may be drained or removed. If the cyst drains on its own, it may return. Resist the temptation to squeeze, pop, stick a needle in it, or cut it open. This often leads to an infection and scarring. If it gets severely inflamed or infected, you should seek medical care. Be sure to clean the cyst area when bathing or showering. Watch for the signs of infection listed below.  Follow-up care  Follow up with your healthcare provider, or as advised.  When to seek medical advice  Call your healthcare provider right away if any of these occur:    Swelling, redness, or pain    Pus coming from the cyst  Date Last Reviewed: 8/1/2016 2000-2017 [employer]. [address] [place] [address], [place] [address]. All rights reserved. This information is not intended as a substitute for professional medical care. Always follow your healthcare professional's instructions.

## 2019-03-05 ENCOUNTER — OFFICE VISIT (OUTPATIENT)
Dept: FAMILY MEDICINE | Facility: CLINIC | Age: 34
End: 2019-03-05
Payer: COMMERCIAL

## 2019-03-05 VITALS
OXYGEN SATURATION: 99 % | WEIGHT: 132.6 LBS | RESPIRATION RATE: 24 BRPM | DIASTOLIC BLOOD PRESSURE: 67 MMHG | BODY MASS INDEX: 26.73 KG/M2 | SYSTOLIC BLOOD PRESSURE: 102 MMHG | HEIGHT: 59 IN | HEART RATE: 64 BPM | TEMPERATURE: 97.7 F

## 2019-03-05 DIAGNOSIS — R30.0 DYSURIA: ICD-10-CM

## 2019-03-05 DIAGNOSIS — G43.009 MIGRAINE WITHOUT AURA AND WITHOUT STATUS MIGRAINOSUS, NOT INTRACTABLE: Primary | ICD-10-CM

## 2019-03-05 DIAGNOSIS — H10.13 ALLERGIC CONJUNCTIVITIS, BILATERAL: ICD-10-CM

## 2019-03-05 DIAGNOSIS — G47.9 SLEEPING DIFFICULTY: ICD-10-CM

## 2019-03-05 DIAGNOSIS — Z23 NEED FOR IMMUNIZATION AGAINST INFLUENZA: ICD-10-CM

## 2019-03-05 LAB
BACTERIA: NORMAL
BILIRUBIN UR: NEGATIVE
BLOOD UR: ABNORMAL
CASTS: NORMAL /LPF
CRYSTAL URINE: NORMAL /LPF
EPITHELIAL CELLS UR: NORMAL /LPF (ref 0–2)
GLUCOSE URINE: NEGATIVE
KETONES UR QL: NEGATIVE
LEUKOCYTE ESTERASE UR: NEGATIVE
MUCOUS URINE: NORMAL LPF
NITRITE UR QL STRIP: NEGATIVE
PH UR STRIP: 6 [PH] (ref 5–7)
PROTEIN UR: NEGATIVE
RBC URINE: <2 /HPF
SP GR UR STRIP: 1.02
UROBILINOGEN UR STRIP-ACNC: ABNORMAL
WBC URINE: NORMAL /HPF

## 2019-03-05 RX ORDER — IBUPROFEN 200 MG
400-600 TABLET ORAL EVERY 6 HOURS PRN
Qty: 120 TABLET | Refills: 1 | Status: SHIPPED | OUTPATIENT
Start: 2019-03-05 | End: 2021-07-29

## 2019-03-05 ASSESSMENT — MIFFLIN-ST. JEOR: SCORE: 1216.1

## 2019-03-05 ASSESSMENT — PAIN SCALES - GENERAL: PAINLEVEL: EXTREME PAIN (8)

## 2019-03-05 NOTE — PROGRESS NOTES
Preceptor Attestation:   Patient seen, evaluated and discussed with the resident. I have verified the content of the note, which accurately reflects my assessment of the patient and the plan of care.   Supervising Physician:  Jorge Harry MD

## 2019-03-05 NOTE — NURSING NOTE
Chief Complaint   Patient presents with     RECHECK     Lump on Head/ Headaches/ Itching in both eyes and Dysuria      Bobby Moreno CMA    Due to patient being non-English speaking/uses sign language, an  was used for this visit. Only for face-to-face interpretation by an external agency, date and length of interpretation can be found on the scanned worksheet.     name: Sanjiv Bravo  Agency: [employer]  Language: Jose Manuel   Telephone number: [phone]  Type of interpretation: Face-to-face, spoken     Bobby Moreno CMA

## 2019-03-05 NOTE — PATIENT INSTRUCTIONS
To do list:  1) Start nightly medication for sleep and headaches  2) Take 400-600 mg ibuprofen as soon as you notice headache symptoms. Try to get away with tylenol when you can  3) Start eye drops 1 drop twice daily for itchy eyes  4) Urine testing - you will get a call if there is anything to treat    Follow-up in 1 month

## 2019-03-05 NOTE — LETTER
March 12, 2019      Sampson QUACH Mo  [address] [place] [address]        Dear Sampson,    Please see below for your test results.    Your urine testing was negative for signs of infection. Please feel free to call the clinic with questions or concerns.     Resulted Orders   Urinalysis ([facility])   Result Value Ref Range    Specific Gravity Urine 1.025 1.005 - 1.030    pH Urine 6.0 4.5 - 8.0    Leukocyte Esterase UR Negative NEGATIVE    Nitrite Urine Negative NEGATIVE    Protein UR Negative NEGATIVE    Glucose Urine Negative NEGATIVE    Ketones Urine Negative NEGATIVE    Urobilinogen mg/dL 0.2 E.U./dL 0.2 E.U./dL    Bilirubin UR Negative NEGATIVE    Blood UR 2+ (A) NEGATIVE   Urine Microscopic ([facility])   Result Value Ref Range    WBC Urine 2-5 <5 /hpf    RBC Urine <2 <5 /hpf    Epithelial Cells UR 10-25 0 - 2 /lpf    Mucous Urine Many NONE lpf    Casts Urine None NONE /lpf    Crystal Urine None NONE /lpf    Bacteria Wet Prep Few None   Urine Culture ([facility])   Result Value Ref Range    Culture SEE RESULTS BELOW       Comment:      CULTURE, URINE   SOURCE: Urine, Clean Catch   CULTURE RESULTS:    No Growth      Narrative    Test performed by:  [facility]  [address] [place] [address]       If you have any questions, please call the clinic to make an appointment.    Sincerely,    Martha Childress MD

## 2019-03-05 NOTE — NURSING NOTE
Injectable influenza vaccine documentation    1. Has the patient received the information for the influenza vaccine? YES    2. Does the patient have a severe allergy to eggs (Patients with a severe egg allergy should be assessed by a medical provider, RN, or clinical pharmacist. If they receive the influenza vaccine, please have them observed for 15 minutes.)? No    3. Has the patient had an allergic reaction to previous influenza vaccines? No    4. Has the patient had any severe allergic reactions to past influenza vaccines ? No       5. Does patient have a history of Guillain-White Cloud syndrome? No      Based on responses above, I administered the influenza vaccine.  Bobby Moreno, CMA

## 2019-03-05 NOTE — PROGRESS NOTES
"       SUBJECTIVE       Sampson QUACH Mo is a 33 year old  female with a PMH significant for:     Patient Active Problem List   Diagnosis     Language barrier, cultural differences     Need for varicella vaccine     Tobacco abuse     Betel deposit on teeth     Sebaceous cyst     She presents with a few concerns.    First, reports headaches and difficulty sleeping. Describes as shooting pain, mostly on the L side, sometimes on the top. Pt had a scalp abscess or sebaceous cyst drained about 2 years ago. She is wondering if this could be related to her headaches. She does get light sensitivity with these headaches. No nausea or vomiting. Frequency is 4-5 times weekly. Pain usually starts around 10 in the morning and lasts until 6 or 7 at night. She also wonders if this cold be related to her mood. Sometimes feels that they are worse when she is mad or annoyed by her kids. She doesn't have time to rest when she has these.    Also reports sleep difficulty. Trouble falling asleep. Usually stays awake until 2-3 in the morning. States that she feels very tired around 10 pm, but cannot fall asleep. Reports that she worries a lot about her kids at nighttime. Her children are 13, 9, 8 and 2 years old.     Also notes itchy eyes. Denies itchy eyes or ears. States that she has runny nose and sneezing all the time.     Burning with urination present for 3 weeks. No odor or hemarturia. She has a nexplanon in place. Has regular menstrual cycles every months. No abnormal vaginal discharge. No fever, some back pain.     PMH, Medications and Allergies were reviewed and updated as needed.        REVIEW OF SYSTEMS     See HPI        OBJECTIVE     Vitals:    03/05/19 0821   BP: 102/67   Pulse: 64   Resp: 24   Temp: 97.7  F (36.5  C)   TempSrc: Oral   SpO2: 99%   Weight: 60.1 kg (132 lb 9.6 oz)   Height: 1.505 m (4' 11.25\")     Body mass index is 26.55 kg/m .    Gen: Well-appearing adult female. Alert, oriented and appropriate. NAD  HEENT: ZARINA, " conjunctiva injected bilaterally. No nasal discharge. Betel nut present in the mouth. Posterior oropharynx non-erythematous, no tonsillar enlargement   Neck: Supple. No lymphadenopathy or tonsillar enlargement  CV: RRR, no rubs, murmurs or extra heart sounds  Pulm: CTAB, no wheezes, rales or rhonchi  Abdomen: Soft, non-distended. Mild tenderness to palpation over the suprapubic region. Normoactive bowel sounds      No results found for this or any previous visit (from the past 24 hour(s)).        ASSESSMENT AND PLAN     1. Migraine without aura and without status migrainosus, not intractable  - ibuprofen (ADVIL/MOTRIN) 200 MG tablet; Take 2-3 tablets (400-600 mg) by mouth every 6 hours as needed for mild pain  Dispense: 120 tablet; Refill: 1  - amitriptyline (ELAVIL) 25 MG tablet; Take 1 tablet (25 mg) by mouth At Bedtime  Dispense: 30 tablet; Refill: 3    2. Sleeping difficulty  Discussed sleep hygiene practices. Reviewed that sleep difficulty often takes weeks to months to correct with dedicated change in lifestyle. Amitriptyline as prescribed above should also help with sleep    3. Allergic conjunctivitis, bilateral  - ketotifen (ZADITOR/REFRESH ANTI-ITCH) 0.025 % ophthalmic solution; Place 1 drop into both eyes 2 times daily  Dispense: 1 Bottle; Refill: 11    4. Dysuria  - Urinalysis ([facility])  - Urine Microscopic ([facility])  - Urine Culture ([facility])    5. Need for immunization against influenza  - ADMIN VACCINE, INITIAL  - FLU VAC QUADRIVLENT SPLIT VIRUS IM 0.5ml dosage        RTC in 1 month for follow up of migraine and sleep difficulty or sooner if develops new or worsening symptoms.    Martha Childress I precepted today with Jorge Harry MD.

## 2019-03-06 LAB — CULTURE: NORMAL

## 2019-04-08 ENCOUNTER — OFFICE VISIT (OUTPATIENT)
Dept: FAMILY MEDICINE | Facility: CLINIC | Age: 34
End: 2019-04-08
Payer: COMMERCIAL

## 2019-04-08 VITALS
BODY MASS INDEX: 26.55 KG/M2 | RESPIRATION RATE: 16 BRPM | SYSTOLIC BLOOD PRESSURE: 110 MMHG | DIASTOLIC BLOOD PRESSURE: 73 MMHG | HEART RATE: 71 BPM | WEIGHT: 132.6 LBS | TEMPERATURE: 98.3 F | OXYGEN SATURATION: 99 %

## 2019-04-08 DIAGNOSIS — G43.009 MIGRAINE WITHOUT AURA AND WITHOUT STATUS MIGRAINOSUS, NOT INTRACTABLE: Primary | ICD-10-CM

## 2019-04-08 DIAGNOSIS — G47.9 SLEEPING DIFFICULTY: ICD-10-CM

## 2019-04-08 RX ORDER — AMITRIPTYLINE HYDROCHLORIDE 50 MG/1
50 TABLET ORAL AT BEDTIME
Qty: 30 TABLET | Refills: 3 | Status: SHIPPED | OUTPATIENT
Start: 2019-04-08 | End: 2019-08-13

## 2019-04-08 NOTE — NURSING NOTE
Due to patient being non-English speaking/uses sign language, an  was used for this visit. Only for face-to-face interpretation by an external agency, date and length of interpretation can be found on the scanned worksheet.     name: Sanjiv Bravo  Agency: [employer]  Language: Jose Manuel   Telephone number: [phone]  Type of interpretation: Face-to-face, spoken

## 2019-04-08 NOTE — PATIENT INSTRUCTIONS
Try to delay sleep until a couple hours after getting home from work.  This will give you time to wind down before your sleep.    Increase Amytripline to 50mg nightly.    April 8, 2019    Faxed referral to Mental Health the clinic will contact patient to schedule.  Yanet QUINTANILLA

## 2019-04-08 NOTE — PROGRESS NOTES
Nursing Notes:   [place], [facility]  4/8/2019  8:54 AM  Signed  Due to patient being non-English speaking/uses sign language, an  was used for this visit. Only for face-to-face interpretation by an external agency, date and length of interpretation can be found on the scanned worksheet.     name: Sanjiv Bravo  Agency: [employer]  Language: Jose Manuel   Telephone number: [phone]  Type of interpretation: Face-to-face, spoken      Chief Complaint   Patient presents with     Follow Up     Pt is here for her 1 month follow up on her headaches.  She states that she is getting them more often but they are not lasting as long.  She states the medication worked at first but is no longer helping.   She states the ibuprofen is only helping for a few hours at a time and then she has a headache again.      Medication Reconciliation     Complete.      Blood pressure 110/73, pulse 71, temperature 98.3  F (36.8  C), temperature source Oral, resp. rate 16, weight 60.1 kg (132 lb 9.6 oz), last menstrual period 04/05/2019, SpO2 99 %, not currently breastfeeding.                 HPI     Sampson D Mo is a 33 year old  female with a PMH significant for:     Patient Active Problem List   Diagnosis     Language barrier, cultural differences     Need for varicella vaccine     Tobacco abuse     Betel deposit on teeth     Sebaceous cyst     She presents with headache follow up.  Headaches 5-6 days last couple weeks.  Headaches last 1-3 hours. She is taking ibuprofen with each headache and it helps.    Still having trouble sleeping.  Amitriptyline helps her go to sleep, but when she wakes at night she can't go back to sleep.  Does have worries about family that keep her awake.  Comes home around 9PM, goes to bed around 10PM and then only sleeps until midnight and then falls asleep around 3-4AM and then wakes at 6-7AM for the day.  Total slep is 5-6 hours a night every other night.  Some nights she is able to sleep  through the night.     PMH, Medications and Allergies were reviewed and updated as needed.     A [employer]  was used for this visit.           Physical Exam:     Vitals:    04/08/19 0849   BP: 110/73   BP Location: Left arm   Patient Position: Sitting   Cuff Size: Adult Large   Pulse: 71   Resp: 16   Temp: 98.3  F (36.8  C)   TempSrc: Oral   SpO2: 99%   Weight: 60.1 kg (132 lb 9.6 oz)     Body mass index is 26.55 kg/m .    Exam:  Constitutional: healthy, alert and no distress  Eyes:  conjunctiva are clear.  ENT: No nasal discharge. TMs clear, Oropharynx clear with no erythema or exudates. No sinus pain. Mild pain with palpation of scalp diffusely.  Cardiovascular:RRR. No murmurs, clicks gallops or rub  Respiratory:  normal respiratory rate and rhythm, lungs clear to auscultation. No wheezes or crackles.  Neuro: normal strength and sensation throughout. Normal gait. CN 2-12 intact.  Psychiatric: mentation appears normal and affect normal/bright        Assessment and Plan     Sampson was seen today for follow up and medication reconciliation.    Diagnoses and all orders for this visit:    Migraine without aura and without status migrainosus, not intractable: STill having frequent headaches, but abortive, ibuprofen is working well.  Increase amitriptyline and will have close follow up.  May be related to poor sleep as well.   -     amitriptyline (ELAVIL) 50 MG tablet; Take 1 tablet (50 mg) by mouth At Bedtime    Sleeping difficulty: Amitriptyline helping a little with sleep.  Delaying sleep from when she gets home for work may also help with sleep latency.  CBT recommended and referral to Behavioral Health team for this for sleep. Rule out mental health diagnosis as well. May be an anxiety component.  -     MENTAL HEALTH REFERRAL  -        Patient Instructions   Try to delay sleep until a couple hours after getting home from work.  This will give you time to wind down before your sleep.    Increase Amytripline to  50mg nightly.    April 8, 2019    Faxed referral to Mental Health the clinic will contact patient to schedule.  Yanet QUINTANILLA       Options for treatment and/or follow-up care were reviewed with the patient. Sampson Fonseca was engaged and actively involved in the decision making process. She verbalized understanding of the options discussed and was satisfied with the final plan.    Sarah Krishnamurthy MD

## 2019-04-08 NOTE — LETTER
RETURN TO WORK/SCHOOL FORM    4/8/2019    Re: Sampson Fonseca  1985      To Whom It May Concern:     Sampson Fonseca was seen in clinic today..  She may return to work without restrictions on 4/9/19          Restrictions:  None      Sarah Krishnamurthy MD  4/8/2019 9:28 AM

## 2019-05-10 ENCOUNTER — OFFICE VISIT (OUTPATIENT)
Dept: FAMILY MEDICINE | Facility: CLINIC | Age: 34
End: 2019-05-10
Payer: COMMERCIAL

## 2019-05-10 VITALS
HEIGHT: 59 IN | BODY MASS INDEX: 26.97 KG/M2 | RESPIRATION RATE: 16 BRPM | TEMPERATURE: 97.6 F | HEART RATE: 115 BPM | SYSTOLIC BLOOD PRESSURE: 110 MMHG | DIASTOLIC BLOOD PRESSURE: 77 MMHG | WEIGHT: 133.8 LBS

## 2019-05-10 DIAGNOSIS — G43.909 MIGRAINE WITHOUT STATUS MIGRAINOSUS, NOT INTRACTABLE, UNSPECIFIED MIGRAINE TYPE: ICD-10-CM

## 2019-05-10 DIAGNOSIS — Z00.00 ROUTINE GENERAL MEDICAL EXAMINATION AT A HEALTH CARE FACILITY: Primary | ICD-10-CM

## 2019-05-10 RX ORDER — ACETAMINOPHEN 325 MG/1
650 TABLET ORAL EVERY 4 HOURS PRN
Qty: 100 TABLET | Refills: 0 | Status: SHIPPED | OUTPATIENT
Start: 2019-05-10 | End: 2021-07-29

## 2019-05-10 ASSESSMENT — MIFFLIN-ST. JEOR: SCORE: 1217.54

## 2019-05-10 NOTE — NURSING NOTE
Due to patient being non-English speaking/uses sign language, an  was used for this visit. Only for face-to-face interpretation by an external agency, date and length of interpretation can be found on the scanned worksheet.     name: Sanjiv Bravo  Agency: [employer]  Language: Jose Manuel   Telephone number: [phone]  Type of interpretation: Face-to-face, spoken

## 2019-05-10 NOTE — PATIENT INSTRUCTIONS
Please follow up to discuss your headaches in 1 month.    For headaches, try to get more sleep. Work on getting lots of water. Please still continue to work on getting set up with behavioral health to discuss sleep hygiene. For your headaches, you can also use Tylenol.     Free English classes for adults: [facility]:  [address] [facility] [place] [address]   PHONE: [phone]      Preventive Health Recommendations  Female Ages 26 - 39  Yearly exam:   See your health care provider every year in order to    Review health changes.     Discuss preventive care.      Review your medicines if you your doctor has prescribed any.    Until age 30: Get a Pap test every three years (more often if you have had an abnormal result).    After age 30: Talk to your doctor about whether you should have a Pap test every 3 years or have a Pap test with HPV screening every 5 years.   You do not need a Pap test if your uterus was removed (hysterectomy) and you have not had cancer.  You should be tested each year for STDs (sexually transmitted diseases), if you're at risk.   Talk to your provider about how often to have your cholesterol checked.  If you are at risk for diabetes, you should have a diabetes test (fasting glucose).  Shots: Get a flu shot each year. Get a tetanus shot every 10 years.   Nutrition:     Eat at least 5 servings of fruits and vegetables each day.    Eat whole-grain bread, whole-wheat pasta and brown rice instead of white grains and rice.    Get adequate Calcium and Vitamin D.     Lifestyle    Exercise at least 150 minutes a week (30 minutes a day, 5 days of the week). This will help you control your weight and prevent disease.    Limit alcohol to one drink per day.    No smoking.     Wear sunscreen to prevent skin cancer.    See your dentist every six months for an exam and cleaning.

## 2019-05-10 NOTE — PROGRESS NOTES
"Preceptor attestation:  Vital signs reviewed: /77   Pulse 115   Temp 97.6  F (36.4  C)   Resp 16   Ht 1.499 m (4' 11\")   Wt 60.7 kg (133 lb 12.8 oz)   BMI 27.02 kg/m      Patient seen, evaluated, and discussed with the resident.  I have verified the content of the note, which accurately reflects my assessment of the patient and the plan of care.    Supervising physician: Mabel James MD  [facility]  "

## 2019-05-10 NOTE — PROGRESS NOTES
Female Physical Note    Concerns today:   Headaches: occurring 4-5x/week despite starting on amitriptyline and ibuprofen. Describes pain on her scalp and top of head. Whole head is pounding and light sensitivity. Medications are occasionally helpful. Not waking with headaches. HAs can last up to 30 minutes. At last visit, her dose of amitryptilene was increased. She has tried tylenol as well.    Insomnia: She still occasionally has insomnia which she attributes to HA. She was referred for CBT at her last visit for sleep.     A Sk[employer]  was used for  this visit.     ROS:  CONSTITUTIONAL: no fatigue, no appetite loss  SKIN: no worrisome rashes,  no worrisome lesions  EYES: no acute vision problems or changes  ENT: no ear problems, no mouth problems, no throat problems  RESP: no significant cough, no shortness of breath  CV: no chest pain, no palpitations, no new peripheral edema  GI: no nausea, no vomiting, no constipation, no diarrhea  : no sexual concerns, no heavy menstrual bleeding     Sexually Active: Yes  Sexual concerns: No   Contraception:Details: Nexplanon    P: 4  Menarche:  irregular menses   STD History: Neg  Last Pap Smear Date: 3/20/2017 normal  Abnormal Pap History: None    Patient Active Problem List   Diagnosis     Language barrier, cultural differences     Need for varicella vaccine     Tobacco abuse     Betel deposit on teeth     Sebaceous cyst       Current Outpatient Medications   Medication Sig Dispense Refill     amitriptyline (ELAVIL) 50 MG tablet Take 1 tablet (50 mg) by mouth At Bedtime 30 tablet 3     etonogestrel (IMPLANON/NEXPLANON) 68 MG IMPL 1 each (68 mg) by Subdermal route continuous  0     ibuprofen (ADVIL/MOTRIN) 200 MG tablet Take 2-3 tablets (400-600 mg) by mouth every 6 hours as needed for mild pain 120 tablet 1     acetaminophen (TYLENOL) 325 MG tablet Take 2 tablets (650 mg) by mouth every 4 hours as needed for mild pain 100 tablet 0     ketotifen  (ZADITOR/REFRESH ANTI-ITCH) 0.025 % ophthalmic solution Place 1 drop into both eyes 2 times daily 1 Bottle 11       Past Medical History:   Diagnosis Date     NO ACTIVE PROBLEMS         Family History     Problem (# of Occurrences) Relation (Name,Age of Onset)    Cancer (1) Mother    Diabetes (1) Father    No Known Problems (16) Maternal Grandmother, Maternal Grandfather, Paternal Grandmother, Paternal Grandfather, Brother, Sister, Son, Daughter, Maternal Half-Brother, Maternal Half-Sister, Paternal Half-Brother, Paternal Half-Sister, Niece, Nephew, Cousin, Other       Negative family history of: Heart Disease, Coronary Artery Disease, Hypertension, Hyperlipidemia, Kidney Disease, Cerebrovascular Disease, Obesity, Thrombosis, Asthma, Arthritis, Thyroid Disease, Depression, Mental Illness, Substance Abuse, Cystic Fibrosis, Early Death, Coronary Artery Disease Early Onset, Heart Failure, Bleeding Diathesis, Dementia, Breast Cancer, Ovarian Cancer, Uterine Cancer, Prostate Cancer, Colorectal Cancer, Pancreatic Cancer, Lung Cancer, Melanoma, Autoimmune Disease, Unknown/Adopted, Genetic Disorder          Problem List Medication List and Allergy List were reviewed.    Patient is an established patient of this clinic..    Social History     Tobacco Use     Smoking status: Never Smoker     Smokeless tobacco: Current User     Types: Chew   Substance Use Topics     Alcohol use: No       Children ? Yes, ages 14, 9, 8, and 2     Has anyone hurt you physically, for example by pushing, hitting, slapping or kicking you or forcing you to have sex? Denies  Do you feel threatened or controlled by a partner, ex-partner or anyone in your life? 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     RISK BEHAVIORS AND HEALTHY HABITS:  Tobacco Use/Smoking: None and occasionally chews betel nut  Illicit Drug Use: None  Do you use alcohol? No  Diet (5-7 servings of fruits/veg daily): Yes   Exercise (30 min accumulated most days):Yes,  "walks around the neighborhood  Dental Care: No, discussed verbal referral for dental services    Calcium 1500 mg/d:  Discussed dietary sources and vitamin D  Seat Belt Use: Yes     Pap/HPV cotest every 5 years for women 30-65   Up to date     Immunization History   Administered Date(s) Administered     HepA-Adult 08/31/2017     HepB 04/06/2017     HepB-Adult 08/31/2017     Influenza Vaccine, 3 YRS +, IM (QUADRIVALENT W/PRESERVATIVES) 10/19/2017, 03/05/2019     TDAP Vaccine (Boostrix) 04/06/2017     EXAMINATION:   /77   Pulse 115   Temp 97.6  F (36.4  C)   Resp 16   Ht 1.499 m (4' 11\")   Wt 60.7 kg (133 lb 12.8 oz)   BMI 27.02 kg/m    GENERAL: healthy, alert and no distress  EYES: Eyes grossly normal to inspection, extraocular movements - intact, and PERRL  HENT: ear canals- normal; TMs- normal; Nose- normal; Mouth- no ulcers, no lesions  NECK: no tenderness, no adenopathy, no asymmetry, no masses, no stiffness; thyroid- normal to palpation  RESP: lungs clear to auscultation - no rales, no rhonchi, no wheezes  BREAST: declined   CV: regular rates and rhythm, normal S1 S2, no S3 or S4 and no murmur, no click or rub -  ABDOMEN: soft, no tenderness, no  hepatosplenomegaly, no masses, normal bowel sounds  MS: extremities- no gross deformities noted, no edema  SKIN: no suspicious lesions, no rashes  NEURO: strength and tone- normal, mentation- intact, speech- normal, reflexes- symmetric  BACK: no curvature  PELVIC: declined   PSYCH: Alert and oriented times 3; speech- coherent , normal rate and volume; able to articulate logical thoughts, able to abstract reason, no tangential thoughts, no hallucinations or delusions, affect- normal  LYMPHATICS: ant. cervical- normal, post. cervical- normal, supraclavicular- normal    1. Routine general medical examination at a health care facility  Up to date with routine screening recommendations and vaccinations.     2. Migraine without status migrainosus, not intractable, " unspecified migraine type  HAs seem somewhat improved with amitryptilene dosage increase. Discussed adding tylenol and getting established with  for CBT regarding sleep habits. Follow up for recheck in 1 month.     Discussed with Dr. James, attending physician, who agrees with the plan.

## 2019-06-18 ENCOUNTER — OFFICE VISIT (OUTPATIENT)
Dept: FAMILY MEDICINE | Facility: CLINIC | Age: 34
End: 2019-06-18
Payer: COMMERCIAL

## 2019-06-18 VITALS
DIASTOLIC BLOOD PRESSURE: 74 MMHG | TEMPERATURE: 99 F | WEIGHT: 135 LBS | SYSTOLIC BLOOD PRESSURE: 110 MMHG | HEART RATE: 81 BPM | RESPIRATION RATE: 18 BRPM | OXYGEN SATURATION: 100 % | BODY MASS INDEX: 27.27 KG/M2

## 2019-06-18 DIAGNOSIS — G43.909 MIGRAINE WITHOUT STATUS MIGRAINOSUS, NOT INTRACTABLE, UNSPECIFIED MIGRAINE TYPE: ICD-10-CM

## 2019-06-18 DIAGNOSIS — J30.2 SEASONAL ALLERGIC RHINITIS, UNSPECIFIED TRIGGER: Primary | ICD-10-CM

## 2019-06-18 RX ORDER — TOPIRAMATE 25 MG/1
25 TABLET, FILM COATED ORAL DAILY
Qty: 30 TABLET | Refills: 11 | Status: SHIPPED | OUTPATIENT
Start: 2019-06-18 | End: 2019-08-13

## 2019-06-18 RX ORDER — FLUTICASONE PROPIONATE 50 MCG
1 SPRAY, SUSPENSION (ML) NASAL DAILY
Qty: 15.8 ML | Refills: 3 | Status: SHIPPED | OUTPATIENT
Start: 2019-06-18 | End: 2021-07-29

## 2019-06-18 RX ORDER — CETIRIZINE HYDROCHLORIDE 10 MG/1
10 TABLET ORAL DAILY
Qty: 30 TABLET | Refills: 3 | Status: SHIPPED | OUTPATIENT
Start: 2019-06-18 | End: 2021-07-29

## 2019-06-18 NOTE — PATIENT INSTRUCTIONS
Please follow up in 30 days for a recheck.    Start Topamax, Flonase and Zyrtec.    Stop amitriptyline and just use ibuprofen;.

## 2019-06-18 NOTE — PROGRESS NOTES
SUBJECTIVE       Sampson MAIN Mo is a 33 year old  female with a PMH significant for:     Patient Active Problem List   Diagnosis     Language barrier, cultural differences     Need for varicella vaccine     Tobacco abuse     Betel deposit on teeth     Sebaceous cyst     Migraine     Patient presents with:  Headache: Pt is here to f/u     Last visit was one month ago for a routine physical and to discuss headaches. Asked to follow up in 1 month for recheck of HA. States headache frequency is about the same since last check. She has headaches 3-4x/week. These have been going on for 5 years. Has never been on prophylactic medication. She feels angry because of these HAs. She rests and takes amitriptyline or ibuprofen. The medications decrease the intensity but do not take the pain completely away. HAs occur at any time of day. She does not consistently wake with HAs. Intensity has been the same. No issues with blurry vision, numbness, or tingling. She does have itching eyes. She feels her HAs radiate from the top of her head down both temples. Pain feels stabbing and she does have photosensitivity with episodes. She has an Implanon and does not plan to be pregnant anytime soon.     She has not yet seen behavioral health for sleep hygiene teaching. She sleeps about 6 hours per night on good days (12A-6A). On some nights, she sleeps less due to falling asleep later. She does not notice any relationship between sleep, diet, or stress and HA frequency. Denies consumption of high sugar foods or diet sodas or drinks. She does try to drink plenty of water. She does endorse runny nose and eye itching.     PMH, Medications and Allergies were reviewed and updated as needed.    ROS: As above per HPI        OBJECTIVE     Vitals:    06/18/19 0818   BP: 110/74   Pulse: 81   Resp: 18   Temp: 99  F (37.2  C)   TempSrc: Oral   SpO2: 100%   Weight: 61.2 kg (135 lb)     Body mass index is 27.27 kg/m .    GENERAL: No acute distress,  appears stated age, pleasant  HEAD: Atraumatic, normocephalic  EYES: PERRLA, EOMI, no scleral injection  THROAT: Moist mucous membranes  CV: Regular rate and rhythm, no murmurs or rubs  LUNGS: Respirations unlabored, no wheezes, crackles or rales  EXT: No peripheral edema, atraumatic  SKIN: No rash, warm and dry  NEURO: MENTAL STATUS: AOx4  LANG/SPEECH: fluent, follows 3-step commands  CRANIAL NERVES:    II: Pupils equal and reactive    III, IV, VI: EOM intact, no gaze preference or deviation, no nystagmus.    V: normal sensation in V1, V2, and V3 segments bilaterally    VII: no asymmetry, no nasolabial fold flattening    VIII: normal hearing to speech    XI: 5/5 head turn and 5/5 shoulder shrug bilaterally  MOTOR:  5/5 muscle power in Rt and Lt upper and lower extremities  REFLEXES:  bilateral flexor planter response, 1+ patella bilaterally  SENSORY:  Normal to touch all limbs  COORD: Normal finger to nose  GAIT: Normal  PSYCH: euthymic, normal affect     LABS/IMAGING/EKG  No results found for this or any previous visit (from the past 24 hour(s)).    ASSESSMENT AND PLAN     1. Seasonal allergic rhinitis, unspecified trigger  Will trial Flonase and Zyrtec and see if this improves her migraines.   - fluticasone (FLONASE) 50 MCG/ACT nasal spray; Spray 1 spray into both nostrils daily  Dispense: 15.8 mL; Refill: 3  - cetirizine (ZYRTEC) 10 MG tablet; Take 1 tablet (10 mg) by mouth daily  Dispense: 30 tablet; Refill: 3    2. Migraine without status migrainosus, not intractable, unspecified migraine type  Due to frequency 3-4x/week will trial prophylaxis with Topamax. She has a nexplanon and does not plan to be pregnant anytime soon. I discussed that if she were to decide to become pregnant, we need to stop this medication immediately. Follow up for recheck in 1 month. If no improvement, I would elect to refer her to Neurology or complete MR brain, however it is reassuring that she denies neurological symptoms and has a  non localizing neurologic exam today.  - topiramate (TOPAMAX) 25 MG tablet; Take 1 tablet (25 mg) by mouth daily  Dispense: 30 tablet; Refill: 11      Paty Weston MD PGY-2  [facility]    Discussed with Dr. Garcia who agrees with the above assessment and plan.

## 2019-06-18 NOTE — PROGRESS NOTES
Preceptor Attestation:   Patient seen, evaluated and discussed with the resident. I have verified the content of the note, which accurately reflects my assessment of the patient and the plan of care.   Supervising Physician:  Peter Garcia MD

## 2019-06-18 NOTE — NURSING NOTE
Due to patient being non-English speaking/uses sign language, an  was used for this visit. Only for face-to-face interpretation by an external agency, date and length of interpretation can be found on the scanned worksheet.     name: Sanjiv Bravo  Agency: [employer]  Language: Jose Manuel   Telephone number: [phone]  Type of interpretation: Face-to-face, spoken

## 2019-07-17 ENCOUNTER — OFFICE VISIT (OUTPATIENT)
Dept: FAMILY MEDICINE | Facility: CLINIC | Age: 34
End: 2019-07-17
Payer: COMMERCIAL

## 2019-07-17 VITALS
RESPIRATION RATE: 16 BRPM | HEIGHT: 59 IN | WEIGHT: 135.6 LBS | DIASTOLIC BLOOD PRESSURE: 75 MMHG | HEART RATE: 88 BPM | SYSTOLIC BLOOD PRESSURE: 109 MMHG | BODY MASS INDEX: 27.34 KG/M2 | TEMPERATURE: 98.4 F

## 2019-07-17 DIAGNOSIS — G43.909 MIGRAINE WITHOUT STATUS MIGRAINOSUS, NOT INTRACTABLE, UNSPECIFIED MIGRAINE TYPE: Primary | ICD-10-CM

## 2019-07-17 DIAGNOSIS — H10.13 ALLERGIC CONJUNCTIVITIS, BILATERAL: ICD-10-CM

## 2019-07-17 RX ORDER — OLOPATADINE HYDROCHLORIDE 1 MG/ML
1 SOLUTION/ DROPS OPHTHALMIC 2 TIMES DAILY
Qty: 5 ML | Refills: 5 | Status: SHIPPED | OUTPATIENT
Start: 2019-07-17 | End: 2021-07-29

## 2019-07-17 ASSESSMENT — MIFFLIN-ST. JEOR: SCORE: 1217.77

## 2019-07-17 NOTE — PROGRESS NOTES
Preceptor Attestation:   Patient seen, evaluated and discussed with the resident. I have verified the content of the note, which accurately reflects my assessment of the patient and the plan of care.   Supervising Physician:  Reggie Restrepo MD

## 2019-07-17 NOTE — PATIENT INSTRUCTIONS
1. Start taking Topamax every day to prevent headaches.  2. Please return in 3 to 4 weeks to re evaluate headaches.  3. I have sent you new eye drops.

## 2019-07-17 NOTE — PROGRESS NOTES
"       SUBJECTIVE       Sampson QUACH Mo is a 33 year old  female with a PMH significant for:     Patient Active Problem List   Diagnosis     Language barrier, cultural differences     Need for varicella vaccine     Tobacco abuse     Betel deposit on teeth     Sebaceous cyst     Migraine     Patient presents with:  RECHECK: Followup Headache    Patient is here for follow up of her migraines. She was last seen on 6/18. At that time she was prescribed topamax as well as Zyrtec and Flonase for some allergy symptoms. She reports taking Topamax 3-5x/week as needed for HAs. She did not realize that this medication was to be used daily as prophylaxis. Reports no change in her headache characteristics. She occasionally awakes in the middle of the night. Migraines occur at various times of day. She endorses intermittent muscle cramping, swelling and numbness in both of her feet. This has been going on for 14 years. Cramping is mostly bothersome. She drinks 6 cups of water per day. She denies blurry vision.    She had been using Flonase daily but reports discontinuing this because she feels better. She has been taking Zyrtec every night. Her nasal congestion and coughing has improved. She does continue to have eye itching. She is using Zaditor eye drops 2-3 times per day. It isn't very helpful. Reports allergies for the past 7-8 years since she was living in [facility].     PMH, Medications and Allergies were reviewed and updated as needed.    ROS: As above per HPI        OBJECTIVE     Vitals:    07/17/19 0818   BP: 109/75   Pulse: 88   Resp: 16   Temp: 98.4  F (36.9  C)   Weight: 61.5 kg (135 lb 9.6 oz)   Height: 1.486 m (4' 10.5\")     Body mass index is 27.86 kg/m .    GENERAL: No acute distress, appears stated age, pleasant  HEAD: Atraumatic, normocephalic  EYES: PERRL, EOMI, no scleral injection  NOSE: Septum midline, no discharge  THROAT: Moist mucous membranes  LUNGS: Respirations unlabored  SKIN: No rash, warm and dry  NEURO: "   MENTAL STATUS: Alert, oriented to person, place, time and situation  LANG/SPEECH: Fluent  CRANIAL NERVES:    II: Pupils equal and reactive    III, IV, VI: EOM intact, no gaze preference or deviation, no nystagmus.    V: normal sensation in V1, V2, and V3 segments bilaterally    VII: no asymmetry, no nasolabial fold flattening    VIII: normal hearing to speech    IX, X: normal palatal elevation, no uvular deviation    XI: 5/5 head turn and 5/5 shoulder shrug bilaterally    XII: midline tongue protrusion  GAIT: Normal gait  PSYCH: euthymic, normal affect     LABS/IMAGING/EKG  No results found for this or any previous visit (from the past 24 hour(s)).    ASSESSMENT AND PLAN     1. Migraine without status migrainosus, not intractable, unspecified migraine type  Migraine headaches remain unchanged in nature or severity.  There are no red flag signs.  She is in normal cranial nerve exam.  I did a full neurological exam at her last visit that was also normal.  She has been taking Topamax only 3-5 times per week, unfortunately, as she was under the impression that it was a rescue medication.  I explained that this medication is for prophylaxis of migraines and she should take it daily.  She does continue to take ibuprofen as needed up to 200 mg at one time.  We will plan to recheck her migraines in 3 to 4 weeks after she switches her dosing to daily.  If she continues to have symptoms at that time, we could consider switching to an alternative medication (she is already failed amitriptyline) versus referral to neurology.  Furthermore, she would benefit from drinking more water and sleeping more hours.    2. Allergic conjunctivitis, bilateral  Reports minimal improvement of eye itching with ketotifen.  We will try Patanol drops and lubricating eyedrops instead.  I did discuss allergy testing with her as it sounds that her allergies have been persistent and year-round for the last 7 to 8 years, however she is unsure that  she would want to do desensitization therapy due to the frequency of visits.  She will continue to think about this and in the meantime we will evaluate her response to the medication change.  She did respond well to Flonase and continues to take Zyrtec.  - olopatadine (PATANOL) 0.1 % ophthalmic solution; Place 1 drop into both eyes 2 times daily  Dispense: 5 mL; Refill: 5  - carboxymethylcellul-glycerin (OPTIVE/REFRESH OPTIVE) 0.5-0.9 % SOLN ophthalmic solution; Place 1 drop into both eyes 3 times daily as needed for dry eyes  Dispense: 10 mL; Refill: 3    Paty Weston MD PGY-2  [facility]    Discussed with Dr. Restrepo who agrees with the above assessment and plan.

## 2019-07-17 NOTE — NURSING NOTE
Due to patient being non-English speaking/uses sign language, an  was used for this visit. Only for face-to-face interpretation by an external agency, date and length of interpretation can be found on the scanned worksheet.     name: Sanjiv Bravo  Agency: [employer]  Language: Jose Manuel   Telephone number: [phone]  Type of interpretation: Face-to-face, spoken

## 2019-08-13 ENCOUNTER — OFFICE VISIT (OUTPATIENT)
Dept: FAMILY MEDICINE | Facility: CLINIC | Age: 34
End: 2019-08-13
Payer: COMMERCIAL

## 2019-08-13 VITALS
WEIGHT: 136.6 LBS | SYSTOLIC BLOOD PRESSURE: 106 MMHG | TEMPERATURE: 98.9 F | BODY MASS INDEX: 27.54 KG/M2 | DIASTOLIC BLOOD PRESSURE: 74 MMHG | RESPIRATION RATE: 16 BRPM | OXYGEN SATURATION: 99 % | HEART RATE: 85 BPM | HEIGHT: 59 IN

## 2019-08-13 DIAGNOSIS — G43.909 MIGRAINE WITHOUT STATUS MIGRAINOSUS, NOT INTRACTABLE, UNSPECIFIED MIGRAINE TYPE: ICD-10-CM

## 2019-08-13 DIAGNOSIS — G47.9 SLEEPING DIFFICULTY: Primary | ICD-10-CM

## 2019-08-13 RX ORDER — LANOLIN ALCOHOL/MO/W.PET/CERES
3 CREAM (GRAM) TOPICAL
Qty: 30 TABLET | Refills: 11 | Status: SHIPPED | OUTPATIENT
Start: 2019-08-13 | End: 2021-07-29

## 2019-08-13 ASSESSMENT — MIFFLIN-ST. JEOR: SCORE: 1219.85

## 2019-08-13 NOTE — PATIENT INSTRUCTIONS
Please follow up with me in one month.    We will call you to set up Neurology referral and referral for sleep hygiene.    Start taking melatonin at night for sleep.       MENTAL HEALTH REFERRAL    August 14, 2019  Mental Health referral routed to behavioral health team for recommendations. See Documentation Only encounter for more information.  SHAYY Mccabe  8/14/2019      NEUROLOGY ADULT REFERRAL   [facility]  Phone: [phone]  Fax: [phone]    [facility]  [address] [place] [id number]    Appointment: 9/9/19 9:30am   Arrival Time:  9:15am  Provider:  Dr. Barboza    Please bring a copy of your insurance card and photo ID    If you cannot make this appointment please call [phone] to reschedule    Referral, demographics, office note and medication list faxed to [phone].     Letter mailed to pt with information per pt request.

## 2019-08-13 NOTE — LETTER
August 15, 2019      Sampson Fonseca  [address] [place] [address]        Dear Sampson,    This is the information for your appointment. Sanjiv Bravo has been scheduled to be your . If you have any questions or concerns, feel free to contact me at [phone].    NEUROLOGY ADULT REFERRAL   [facility]  Phone: [phone]  Fax: [phone]    [facility]  [address] [place] [id number]    Appointment: 9/9/19 9:30am   Arrival Time:  9:15am  Provider:  Dr. Barboza    Please bring a copy of your insurance card and photo ID    If you cannot make this appointment please call [phone] to reschedule      Sincerely,    SHAYY Mccabe

## 2019-08-13 NOTE — NURSING NOTE
Due to patient being non-English speaking/uses sign language, an  was used for this visit. Only for face-to-face interpretation by an external agency, date and length of interpretation can be found on the scanned worksheet.     name: Sanjiv Bravo  Agency: [employer]   Language: Jose Manuel   Telephone number: [phone]  Type of interpretation: Face-to-face, spoken

## 2019-08-13 NOTE — PROGRESS NOTES
SUBJECTIVE       Sampson MAIN Mo is a 34 year old  female with a PMH significant for:     Patient Active Problem List   Diagnosis     Language barrier, cultural differences     Need for varicella vaccine     Tobacco abuse     Betel deposit on teeth     Sebaceous cyst     Migraine     Patient presents with:  RECHECK: Follow up Headaches    Patient is a 34-year-old female with a history of migraines who is here to discuss her migraines.  I asked her to follow-up after last seeing her in July at which time she had been taking Topamax only as needed instead of daily as prescribed.  At that time, we switched her Topamax to daily scheduled.  She tells me that she has been taking it as prescribed since then but has not noticed any improvement in her migraines.  She continues to have migraines 4-6 times per week without any change in the nature or severity.  They continue to occur at various times throughout the day.  She continues to have difficulty with sleep.  She will lay in bed and be unable to fall asleep for 1 to 2 hours.  She will only get 5 to 6 hours of sleep total.  Sleep hygiene has been discussed in the past as well as referral to behavioral health regarding this but she did not complete it.  She is open to the referral today.  She has tried amitriptyline in the past without improvement.    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  She does not feel that she has much stress in her life.  She drinks a tea from [employer] on occasion but cannot think of any caffeine intake.  She drinks plenty water throughout the day and voids 6-7 times per day.  She eats a diet that is needed for her consisting of fruits, vegetables, rice and meat.  She denies any intake of fast foods or processed foods.  She cooks for herself and her family.    She denies any new localized numbness, tingling or weakness.  She denies any visual changes or change in the nature of her headaches.  She has had migraines for years and preceding  "placement of Nexplanon in 2014.  She is also had insomnia for an extended period of time.  She then mentions that she has had a procedure done by neurology at some point in the past where something was drained from her head.    PMH, Medications and Allergies were reviewed and updated as needed.    ROS: As above per HPI        OBJECTIVE     Vitals:    08/13/19 0833   BP: 106/74   BP Location: Left arm   Patient Position: Sitting   Cuff Size: Adult Regular   Pulse: 85   Resp: 16   Temp: 98.9  F (37.2  C)   TempSrc: Oral   SpO2: 99%   Weight: 62 kg (136 lb 9.6 oz)   Height: 1.49 m (4' 10.66\")     Body mass index is 27.91 kg/m .    GENERAL: No acute distress, appears stated age, pleasant  HEAD: Atraumatic, normocephalic  EYES: PERRL, EOMI, no scleral injection  NOSE: Septum midline, no discharge  LUNGS: Respirations unlabored  SKIN: No rash, warm and dry  NEURO: No gross focal deficits, normal gait  PSYCH: euthymic, normal affect     LABS/IMAGING/EKG  No results found for this or any previous visit (from the past 24 hour(s)).    ASSESSMENT AND PLAN     1. Sleeping difficulty  I discussed with her that I feel that her sleeping difficulties are contributing to migraines and that she would benefit from sleep therapy.  She is open to referral to behavioral health today to discuss sleep hygiene.  In addition, we will try melatonin at night for sleep.  She has failed amitriptyline in the past.  - melatonin 3 MG tablet; Take 1 tablet (3 mg) by mouth nightly as needed for sleep  Dispense: 30 tablet; Refill: 11  - MENTAL HEALTH REFERRAL  -    2. Migraine without status migrainosus, not intractable, unspecified migraine type  I have now seen her for migraines for several months.  There has been no improvement with Topamax or amitriptyline.  She apparently has had migraines for many years and mentions a procedure of something being drained from her skull in 2015.  She moved to our clinic from [place] in 2017 and we unfortunately do " not have these records.  There are no new neurological symptoms and I have checked a complete neurological exam 2 visits ago and a brief neuro exam at her last visit without any focal deficits.  Her migraines remain unchanged.  I think at this point it makes sense to refer to neurology for ongoing work-up in addition to working on sleep hygiene.  I will discontinue Topamax and amitriptyline.  - NEUROLOGY ADULT REFERRAL; Future      Paty Weston MD PGY3  [facility]  [phone] (P)    Discussed with Dr. Harry who agrees with the above assessment and plan.

## 2019-08-14 ENCOUNTER — DOCUMENTATION ONLY (OUTPATIENT)
Dept: PSYCHOLOGY | Facility: CLINIC | Age: 34
End: 2019-08-14

## 2019-08-14 NOTE — PROGRESS NOTES
Behavioral Health Team,    Patient is being referred for mental health services by their provider, Dr. Weston.  Note and order indicate that this is a referral for a lifestyle clinic visit for sleep hygeine. SW contacted pt ([phone]). Appt scheduled for 8/27/19 at 1:00pm with Dr. Norton.    Language Line: Jose Manuel duenas,  Id: [id number]    SHAYY Mccabe  8/14/2019

## 2019-08-26 NOTE — PROGRESS NOTES
"Health & Behavior Assessment for Insomnia    Visit type: initial  Length of visit:  40 minutes (patient arrived late for appt and stated need to leave early so assessment was abbreviated slightly as a result).   Others present:  and family medicine resident on rotation     name: Sanjiv Bravo  Agency: [employer]  Language: Jose Manuel   Telephone number: [phone]  Type of interpretation: Face-to-face, spoken     Complexity statement: Due to patient being non-English speaking/uses sign language, an  was used for this visit.  Date and length of interpretation can be found on the scanned worksheet.  An  is used not only to interpret language, since the patient does not speak English, but also to help with the complexity of understandings across cultures, since the patient is not well integrated in the larger [place] culture.    Subjective: Sampson Fonseca is a 34 year old,  female who was referred for behavioral health assessment and treatment by Dr. Weston to help with the psychosocial aspects of managing insomnia.    Patient Goals: Would like to sleep 8 hours/night and have less difficulty falling asleep.     Presenting Problem: Patient reports that the most distressing/disturbing aspect of current sleep pattern is \"I just can't sleep.\" Currently she sleeps from around 1 am (on average) to about 6 am (on average). Sleep is worse when she has a headache, which is frequently.     Sleep Habits (recent typical week)  Beginning of Sleep Period:    Weekend (if different)   Time to bed: 12 am - 2 am same   Time to lights out: 0 min same   Average time to fall asleep: At least 30 minutes same     What you do when you cannot sleep? Take ibuprofen for headache    Pre bedtime activities:  House chores, which are sometimes physically active and sometimes less so     Pre sleep arousal: Neck and head tension. She denies any worry or low mood before bed     What happens when you cannot get to " sleep (thoughts/behaviors)? Tossing and turning, stays in bed.     Middle of the night:    Weekend (if different)   Number of awakenings after sleep onset: 1-2 same   Total time awake after sleep onset: Unclear - seemed to vary significantly per night same   Average time to fall asleep: Unclear - seemed to vary significantly per night same      (Average/worst/timing of prolonged wakefulness): Sleeping only from 2 AM - 6 PM. This happens occasionally and represents her worst sleep cycle     What happens when awake in the middle of the night (thoughts/behaviors):  Tosses and turns in bed, does not get out of bed     End of the night:    Weekend (if different)   Final wake time: 6 am - 7 am  same   Time out of bed: 6 am - 7 am same   Early morning awakenings (1-3 hours of intended wake time): No No   How much earlier than intended? n/a n/a   Number of days a week: n/a n/a3   Difficulties waking up at intended time: No No   Estimated average total sleep time: 6 hours 6 hours     Naps  Ability to nap if given an opportunity: Yes   If yes: up to twice per week for one hour in early afternoon    Daytime Effects  She states she feels okay during the day. She denies problems with concentration or sleepiness. She would just like to be able to get to sleep easier and sleep through the night.     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     History  When did the problem start? She started having migraine headaches many years ago (this history is not entirely clear and unfortunately we have not been able to get records from outside providers). She estimtaes that the sleep problems onset a few months after the migraines began.     Identifiable precipitating factor: She thinks maybe it was the headaches, but she is not sure.     Family history of insomnia and other sleep disorders: None     Circadian tendencies: nighttime person    Sleep medication(s)/aids  She was prescribed  "melatonin but it was not covered by insurance so she did not get it.     Other sleep Problems  Obstructive sleep apnea (SHAMIKA) symptoms: None     PLM/RLS symptoms: None, though notes she sometimes gets \"angelito horse\" cramps in calves, which she treats by having her  give her a massage    Parasomnia symptoms: None    Nightmares: None    Other unusual behaviors during sleep: None    Substances    Caffeine 1 cup of tea sometimes in the morning     Nicotine: betelnut 2 times in morning and 2 times in the evening; she saw Dr. Brown in lifestyle clinic in 2015 for betelnut cessation and appears to have maintained the positive changes from that episode of care.     Alcohol: none     Recreational drugs: None     Unhealthy sleep practices  Her sleep environment generally seemed conducive for sleep. She sleeps in a dark bedroom with . No children or pets are in the bed or bedroom. She states it is quiet. She does say it is too hot and this may be keeping her awake. Discussed recommendation for cool sleeping environment. She does often engage in physically active chores before bed.     Medical comorbidities:  Patient Active Problem List   Diagnosis     Language barrier, cultural differences     Need for varicella vaccine     Tobacco abuse     Betel deposit on teeth     Sebaceous cyst     Migraine       Psychiatric comorbidities:   Did not assess directly today- per record no history of psychiatric concerns     .  PHQ-9 SCORE 8/31/2017 8/27/2019   PHQ-9 Total Score 0 6         Objective: Ms. Fonseca is awake and alert for today's visit.     Importance level: not assessed   Confidence level: not assessed    Assessment: Ms. Fonseca is a  woman who was referred to [facility] for insomnia. Factors that contribute to patient s insomnia include: chronic migraines, too-hot sleeping environment, betelnut use in the evening, and staying in bed when not able to sleep. Patient appeared overwhelmed when we completed the " assessment, so we decided to set only a couple of small goals (see AVS) relating to betelnut use and temperature modification at bedtime/during night awakenings. I am hopeful that once patient's headaches are better controlled, her sleep may improve. She seemed to have limited knowledge of non-pharmacological treatments of migraines (e.g., learning triggers, relaxation strategies, use of cold/heat). Fortunately, many of these strategies can also help with sleep. We will discuss these multi-modal approaches further at our next visit.      Stage of change: CONTEMPLATION (Considering change and yet undecided)   Diagnosis: Insomnia, unspecified type     Chronic headaches     Betelnut use    Plan:  1.  Described integrated care team and shared chart with primary care provider.  2.  Get Lifestyle Risk Screener at next visit to assess other factors impacting health   3. Patient to attend neurology appointment on 9/9/2019  4. Patient recommended to follow-up with me in 6 weeks though I do not see that this has been scheduled to address sleep and psychosocial management of headaches. I will check in with the patient in 1-2 weeks to assess her desire to schedule this. In the meantime, she agreed to work on the goals noted in the patient instructions.       Dana Norton, PhD  Behavioral Health Fellow

## 2019-08-27 ENCOUNTER — OFFICE VISIT (OUTPATIENT)
Dept: PSYCHOLOGY | Facility: CLINIC | Age: 34
End: 2019-08-27
Payer: COMMERCIAL

## 2019-08-27 DIAGNOSIS — G47.00 INSOMNIA, UNSPECIFIED TYPE: Primary | ICD-10-CM

## 2019-08-27 ASSESSMENT — PATIENT HEALTH QUESTIONNAIRE - PHQ9: SUM OF ALL RESPONSES TO PHQ QUESTIONS 1-9: 6

## 2019-08-27 NOTE — PATIENT INSTRUCTIONS
-  Move betelnut use to a time when sun is still out in the afternoon      Keep bedroom environment cool and consider using ice on head to keep cool   67 degrees Fahrenheit is ideal         Return to see Dr. Norton in [facility] for 30 minute visit in 6 weeks      Dana Norton, PhD  Behavioral Health Fellow

## 2019-09-03 ENCOUNTER — TELEPHONE (OUTPATIENT)
Dept: FAMILY MEDICINE | Facility: CLINIC | Age: 34
End: 2019-09-03

## 2019-09-03 NOTE — TELEPHONE ENCOUNTER
Behavioral Health Telephone Consult  Purpose of call: see if patient wants to schedule Lifestyle follow-up  Time on phone: 5 min  Spoke with: Patient   used: Yes: Ingrid Acosta #[id number]  Topics discussed/Interventions Provided:  Patient thought she had already scheduled this follow-up appt but had confused it with appt with PCP. She agreed to schedule follow-up. See below.      Plan:   Routing to  to please schedule at 2:00 pm on Wednesday October 3 for Lifestyle follow-up, 30 min visit. Patient already knows about this appointment. She requests Sanjiv Bravo as .         Dana Norton, PhD  Behavioral Health Fellow

## 2019-09-09 ENCOUNTER — RECORDS - HEALTHEAST (OUTPATIENT)
Dept: LAB | Facility: HOSPITAL | Age: 34
End: 2019-09-09

## 2019-09-09 ENCOUNTER — RECORDS - HEALTHEAST (OUTPATIENT)
Dept: ADMINISTRATIVE | Facility: OTHER | Age: 34
End: 2019-09-09

## 2019-09-09 LAB
FERRITIN SERPL-MCNC: 24 NG/ML (ref 10–130)
VIT B12 SERPL-MCNC: 457 PG/ML (ref 213–816)

## 2019-09-11 ENCOUNTER — OFFICE VISIT (OUTPATIENT)
Dept: FAMILY MEDICINE | Facility: CLINIC | Age: 34
End: 2019-09-11
Payer: COMMERCIAL

## 2019-09-11 VITALS
HEART RATE: 81 BPM | RESPIRATION RATE: 16 BRPM | SYSTOLIC BLOOD PRESSURE: 104 MMHG | OXYGEN SATURATION: 98 % | BODY MASS INDEX: 27.79 KG/M2 | WEIGHT: 136 LBS | TEMPERATURE: 98 F | DIASTOLIC BLOOD PRESSURE: 70 MMHG

## 2019-09-11 DIAGNOSIS — G47.9 SLEEPING DIFFICULTY: ICD-10-CM

## 2019-09-11 DIAGNOSIS — G43.909 MIGRAINE WITHOUT STATUS MIGRAINOSUS, NOT INTRACTABLE, UNSPECIFIED MIGRAINE TYPE: Primary | ICD-10-CM

## 2019-09-11 RX ORDER — RIZATRIPTAN BENZOATE 5 MG/1
5 TABLET ORAL
COMMUNITY
End: 2021-07-29

## 2019-09-11 RX ORDER — TOPIRAMATE 25 MG/1
25 TABLET, FILM COATED ORAL AT BEDTIME
COMMUNITY
End: 2021-07-29

## 2019-09-11 NOTE — PATIENT INSTRUCTIONS
Please return for a complete physical in 3 months or sooner if needed.    Avoid MSG, high fructose corn syrup, artifical sweeteners.

## 2019-09-11 NOTE — NURSING NOTE
Due to patient being non-English speaking/uses sign language, an  was used for this visit. Only for face-to-face interpretation by an external agency, date and length of interpretation can be found on the scanned worksheet.     name: Sanjiv Bravo  Agency: [employer]  Language: Jose Manuel   Telephone number: [phone]  Type of interpretation: Face-to-face, spoken

## 2019-09-11 NOTE — PROGRESS NOTES
SUBJECTIVE       Sampson Fonseca is a 34 year old  female with a PMH significant for:     Patient Active Problem List   Diagnosis     Language barrier, cultural differences     Need for varicella vaccine     Tobacco abuse     Betel deposit on teeth     Sebaceous cyst     Migraine     Insomnia, unspecified type     She presents for headache and insomnia f/u.     Patient was seen by Neurology on 9/9. We do not have their records yet. Pt states she was started on 2 new medications, she is unsure of the names. She picked them up from the pharmacy yesterday but has not started them yet. There has been no change to her headache quality. Has headaches almost every day without known triggers. No numbness, tingling, weakness.     Patient was also seen by Dr. Norton in [facility] on 8/27. Since that visit Sampson Fonseca has been able to change her sleeping habits and is sleeping from 11 pm to 6/7 am. She has not yet noticed any changes in her headaches, but feels like with better sleep she has less headaches.     PMH, Medications and Allergies were reviewed and updated as needed.    ROS: As above per HPI        OBJECTIVE     Vitals:    09/11/19 0811   BP: 104/70   Pulse: 81   Resp: 16   Temp: 98  F (36.7  C)   TempSrc: Oral   SpO2: 98%   Weight: 61.7 kg (136 lb)     Body mass index is 27.79 kg/m .    GENERAL: No acute distress, appears stated age, pleasant, smiles and is appropriately interactive with eaxminer  HEAD: Atraumatic, normocephalic  EYES: PERRL, EOMI, no scleral injection  CV: Regular rate and rhythm, no murmur, radial pulses intact bilatearlly  LUNGS: Respirations unlabored, clear to auscultation bilaterally, no wheezes    SKIN: No rash on exposed skin, warm and dry  NEURO: CN II-XII intact, motor strength 5/5 in upper and lower extremities, normal senstation, normal gait, normal reflexes   PSYCH: euthymic, normal mood and affect    ASSESSMENT AND PLAN     1. Migraine without status migrainosus, not intractable,  unspecified migraine type  Pt with long standing history of migraines with failed management on amitriptyline and Topamax in the past. Given her intractable headaches at our last visit I referred her to Neurology who she saw 9/9 and was started her on new medical management.  We called her pharmacy and verified that the new medications are Topamax 25 mg at bedtime and rizatriptan 5 mg as needed for onset of migraines.  Patient has not had any changes in her headaches. No red flag symptoms. We reviewed potentially dietary triggers today including MSG, artificial sweeteners, and high fructose corn syrup. Patient does not believe she consumes any of these but will make sure to check labels. She is scheduled for brain MRI per Neurology tomorrow, and has follow up with them in 1 month.     2. Sleeping difficulty  Saw lifestyle clinic and has been able to incorporate changes in sleep, now sleeping from 11 pm to 6 or 7 am. Has not yet noticed any changes in frequency of headaches, but is hopeful that better sleep is going to help. She will follow up with lifestyle clinic again in 1 month.     Follow up in 3 months for f/u headaches, insomnia or sooner if develops new or worsening symptoms.    Nicole Hart, MS4  [facility]     Resident/Fellow Attestation   I, Paty Weston, was present with the medical student who participated in the service and in the documentation of the note.  I have verified the history and personally performed the physical exam and medical decision making.  I agree with the assessment and plan of care as documented in the note.      Paty Weston MD  PGY3    Today we precepted with Dr. Jorge Harry.

## 2019-09-12 ENCOUNTER — HOSPITAL ENCOUNTER (OUTPATIENT)
Dept: MRI IMAGING | Facility: HOSPITAL | Age: 34
Discharge: HOME | End: 2019-09-12
Attending: PSYCHIATRY & NEUROLOGY

## 2019-09-12 DIAGNOSIS — R22.0 HEAD LUMP: ICD-10-CM

## 2019-09-12 LAB — METHYLMALONATE SERPL-SCNC: 0.14 UMOL/L (ref 0–0.4)

## 2019-09-16 ENCOUNTER — TELEPHONE (OUTPATIENT)
Dept: FAMILY MEDICINE | Facility: CLINIC | Age: 34
End: 2019-09-16

## 2019-10-02 ENCOUNTER — OFFICE VISIT (OUTPATIENT)
Dept: PSYCHOLOGY | Facility: CLINIC | Age: 34
End: 2019-10-02
Payer: COMMERCIAL

## 2019-10-02 DIAGNOSIS — G47.00 INSOMNIA, UNSPECIFIED TYPE: Primary | ICD-10-CM

## 2019-10-02 DIAGNOSIS — G43.909 MIGRAINE WITHOUT STATUS MIGRAINOSUS, NOT INTRACTABLE, UNSPECIFIED MIGRAINE TYPE: ICD-10-CM

## 2019-10-02 NOTE — PROGRESS NOTES
[facility] Follow Up Note    Length of visit: 15minutes  Others present:  Number of visits post initial assessment: 1        name: Sanjiv Bravo  Agency: [employer]  Language: Jose Manuel   Telephone number: [phone]  Type of interpretation: Face-to-face, spoken     Complexity statement: Due to patient being non-English speaking/uses sign language, an  was used for this visit.  Date and length of interpretation can be found on the scanned worksheet.  An  is used not only to interpret language, since the patient does not speak English, but also to help with the complexity of understandings across cultures, since the patient is not well integrated in the larger [place] culture.       Identifying Information and Presenting Problem:  The patient is a 34 year old  female who was referred to [facility] by Dr. Weston for help with psychosocial management of insomnia. She thought today's appointment was to get her MRI results. She had MRI of brain on 9/12/2019 and states she has not received any results for this. Re-oriented to purpose of today's visit.     Visit Summary:  Sleep: Her sleep has improved drastically since our first visit. She has established a consistent sleep/wake routine (sleeps around 10:00 pm and wakes at 6:00 am). She also moved her last use of betelnut up to earlier in the day. She feels generally well and refreshed upon awakening but still complaining of headaches that make her feel unwell in the mornings. She is satisfied with her sleep and does not wish to make any more changes in this area.     Headaches: She still has headaches most days, though they do not really impair her functioning at work. She was seen by neurology on 9/9/2019 but we do not have those records. Presumably, neurologist ordered the MRI which she underwent on 9/12/2019. Very anxious about getting these results. Encouraged her to schedule appointment with Dr. Weston to  discuss. Discussed possibilities of behavioral health approaches for headache management. Patient declined further follow-up, stating preference for pharmacological approach to headache management.     Objective: Pt appears awake and alert for today's visit.    Diagnosis:   Insomnia, unspecified type  Migraine    Plan:  1. Patient declined further follow-up with behavioral health at this time, though I did provide education on how behavioral health support may be helpful in managing psychosocial aspects of headache.   2. Patient will follow-up with Dr. Weston to discuss MRI results and to continue follow-up on her headache.       Dana Norton, PhD  Behavioral Health Fellow

## 2019-10-02 NOTE — PATIENT INSTRUCTIONS
Keep up the great work with your sleep!   Schedule follow-up with Dr. Weston on 10/9/2019 at 2:30 PM for headache follow-up/get results from scan.       Dana Norton, PhD  Behavioral Health Fellow

## 2019-10-16 ENCOUNTER — OFFICE VISIT (OUTPATIENT)
Dept: FAMILY MEDICINE | Facility: CLINIC | Age: 34
End: 2019-10-16
Payer: COMMERCIAL

## 2019-10-16 VITALS
SYSTOLIC BLOOD PRESSURE: 112 MMHG | RESPIRATION RATE: 16 BRPM | DIASTOLIC BLOOD PRESSURE: 77 MMHG | BODY MASS INDEX: 28.4 KG/M2 | TEMPERATURE: 98.3 F | WEIGHT: 139 LBS | OXYGEN SATURATION: 100 % | HEART RATE: 76 BPM

## 2019-10-16 DIAGNOSIS — G43.909 MIGRAINE WITHOUT STATUS MIGRAINOSUS, NOT INTRACTABLE, UNSPECIFIED MIGRAINE TYPE: Primary | ICD-10-CM

## 2019-10-16 DIAGNOSIS — Z23 NEED FOR PROPHYLACTIC VACCINATION AND INOCULATION AGAINST INFLUENZA: ICD-10-CM

## 2019-10-16 NOTE — PROGRESS NOTES
SUBJECTIVE       Sampson Fonseca is a 34 year old  female with a PMH significant for:     Patient Active Problem List   Diagnosis     Language barrier, cultural differences     Need for varicella vaccine     Tobacco abuse     Betel deposit on teeth     Sebaceous cyst     Migraine     Insomnia, unspecified type     Patient presents with:  Headache    Here for follow-up on migraines.  September, she saw neurology and was prescribed Topamax 25 mg daily with instructions to increase as needed as well as rizatriptan as needed.  She has been taking these medications although incorrectly since then.  She has been taking Topamax 2 pills/day on most days but has also been taking 4 pills on some days and only 1 pill and other days.  She has noticed an improvement in her migraines.  She is now having a couple of days that are symptom-free per week.  She previously been having migraines every day.  She notes no change in the nature of her migraines.  She has also been using rizatriptan but thought that this was supposed to be daily medication.  She has not been taking it every day as she did not have that many pills given to her.  She did notice that when she takes it at the onset of a migraine seems to improve her symptoms.  We did the MRI in September as ordered by neurology.  Her next follow-up with them is on November 25.    PMH, Medications and Allergies were reviewed and updated as needed.    ROS: As above per HPI        OBJECTIVE     Vitals:    10/16/19 1120   BP: 112/77   Pulse: 76   Resp: 16   Temp: 98.3  F (36.8  C)   TempSrc: Oral   SpO2: 100%   Weight: 63 kg (139 lb)     Body mass index is 28.4 kg/m .    GENERAL: No acute distress, appears stated age, pleasant  HEAD: Atraumatic, normocephalic  EYES: PERRL, EOMI, no scleral injection  NOSE: Septum midline, no discharge  LUNGS: Respirations unlabored  NEURO: No gross focal deficits  PSYCH: euthymic, normal affect     LABS/IMAGING/EKG  No results found for this or any  previous visit (from the past 24 hour(s)).    ASSESSMENT AND PLAN     1. Migraine without status migrainosus, not intractable, unspecified migraine type  Migraine frequency is improved with daily Topamax.  I had a long conversation with her about how to take Topamax and rizatriptan.  I instructed her to take 3 pills of Topamax daily since she is still having migraines every week on at least half of the days and to take rizatriptan only at the start of a migraine.  She verbalizes understanding.  We reviewed her MRI results together, which shows some changes diffusely in the white matter that can be seen with chronic ischemic disease or chronic migraines.  She does not have any cardiac history and there is no family history of cardiac disease.  I do think these changes are secondary to her chronic migraines she has had them for several years.  Instructed her to follow-up with neurology and follow-up with me in 6 months or sooner if needed.      Paty Weston MD PGY3  [facility]  [phone] (P)    Discussed with Dr. Trimble who agrees with the above assessment and plan.

## 2019-10-16 NOTE — NURSING NOTE
Due to patient being non-English speaking/uses sign language, an  was used for this visit. Only for face-to-face interpretation by an external agency, date and length of interpretation can be found on the scanned worksheet.     name: Sanjiv Bravo  Agency: [employer]  Language: Jose Manuel   Telephone number: [phone]  Type of interpretation: Face-to-face, spoken

## 2019-10-28 NOTE — PROGRESS NOTES
Preceptor Attestation:   Patient seen, evaluated and discussed with the resident. I have verified the content of the note, which accurately reflects my assessment of the patient and the plan of care.   Supervising Physician:  Mitul Trimble MD.

## 2020-04-21 ENCOUNTER — TELEPHONE (OUTPATIENT)
Dept: FAMILY MEDICINE | Facility: CLINIC | Age: 35
End: 2020-04-21

## 2020-04-21 NOTE — TELEPHONE ENCOUNTER
Reached out to patient during COVID19 Clinic outreach with Jose Manuel interpretor. Reassured patient that [facility] is still open and has started implementing phone and video appointments to help patient remain safe at home.     Patient reports the following concerns: none    Paty Weston MD

## 2020-07-02 ENCOUNTER — OFFICE VISIT (OUTPATIENT)
Dept: FAMILY MEDICINE | Facility: CLINIC | Age: 35
End: 2020-07-02
Payer: COMMERCIAL

## 2020-07-02 VITALS
HEART RATE: 83 BPM | HEIGHT: 59 IN | OXYGEN SATURATION: 100 % | DIASTOLIC BLOOD PRESSURE: 76 MMHG | RESPIRATION RATE: 18 BRPM | TEMPERATURE: 97.9 F | SYSTOLIC BLOOD PRESSURE: 112 MMHG | WEIGHT: 135 LBS | BODY MASS INDEX: 27.21 KG/M2

## 2020-07-02 DIAGNOSIS — Z30.46 SURVEILLANCE OF PREVIOUSLY PRESCRIBED IMPLANTABLE SUBDERMAL CONTRACEPTIVE: Primary | ICD-10-CM

## 2020-07-02 ASSESSMENT — MIFFLIN-ST. JEOR: SCORE: 1217.99

## 2020-07-02 NOTE — PROGRESS NOTES
Procedure Note-Nexplanon Removal    Sampson D Mo is a patient of Dallin Kathleen here for removal of etonogestrel implant Nexplanon/Implanon    Indication: 3 years after prior Nexplanon insertion    Consent: Affirmation of informed consent was signed and scanned into the medical record. Risks, benefits and alternatives were discussed. Patient's questions were elicited and answered.   Procedure safety checklist was completed:  Yes  Time Out (Pause for the Cause) completed: Yes      Preoperative Diagnosis: etonogestrel implant  Postoperative Diagnosis: etonogestrel implant removed    Technique:   Skin prep Betadine  Anesthesia 1% lidocaine, without epi  Procedure: Small incision (<5mm) was made at distal end of palpable implant, curved hemostat was used to isolate the implant and bring it to the incision, the fibrous capsule containing the implant  was incised and the Implant was removed intact.  EBL: minimal  Complications:  No  Tolerance:  Pt tolerated procedure well and was in stable condition.     Contraception was discussed and patient chose the following method Nexplanon      Follow up: Pt was instructed to call if bleeding, severe pain or foul smell.     Follow up in 2-4 weeks.      Resident: Benjamin Verdin MD  Faculty: Benjamin Verdin MD present for and supervised this entire procedure.    Procedure Note-Nexplanon Insertion    Sampson D Mo is a patient of Dallin Kathleen here for placement of etonogestrel implant (Nexplanon)    Indication:Contraception    Consent: Affirmation of informed consent was signed and scanned into the medical record. Risks, benefits and alternatives were discussed. Patient's questions were elicited and answered.   Procedure safety checklist was completed:  Yes  Time Out (Pause for the Cause) completed: Yes    Labs: UPT:  N/A, Nexplanon present  LMP:   Patient's last menstrual period was 06/20/2020.       Previous Contraception:  Nexplanon  Counseling:  Pt.  counseled on potential side effects of  Nexplanon, including unpredictable spotting/bleeding and plan for removal/replacement of Nexplanon in 3 years or less.    Preoperative Diagnosis: Desires effective contraception  Postoperative Diagnosis: etonogestrel implant in place  Skin prep Betadine  Anesthesia 1% lidocaine, without epi    Technique:   Patient was placed supine with left arm exposed.  Zoey was made 8-10cm above medial epicondial and a guiding zoey 4 cm above the first, 0.5 cm lateral from prior insertion sent.  Arm was prepped with betadine. Insertion point was anesthetized with 1% lidocaine 3mL. After stretching the skin with thumb and index finger around the insertion site.  Skin punctured with the tip of the needle inserted at 30 degrees and then lowered to horizontal position. While lifting the skin with the tip of the needle, slid the needle to its full length. Applicator was then stabilized and the purple slider was unlocked by pushing it slightly down. Slider moved back completely until it stopped. Applicator was then removed.  Correct placement of the implant was confirmed by palpation in the patient's arm and visualizing the purple top of the obturator.  Insertion site was dressed with an adhesive covered by a pressure dressing.      User card and patient chart label filled out. User card  given to patient for record. Nexplanon added to medication list     Lot# [ [id number] ]    EBL: minimal  Complications:  No  Tolerance:  Pt tolerated procedure well and was in stable condition.         Follow up: Pt was instructed to call if bleeding, severe pain or foul smell.     Follow up in 2-4 weeks.  Resident: Benjamin Verdin MD  Faculty: Dr. Bala Trimble present for and supervised this entire procedure.

## 2020-07-02 NOTE — NURSING NOTE
Due to patient being non-English speaking/uses sign language, an  was used for this visit. Only for face-to-face interpretation by an external agency, date and length of interpretation can be found on the scanned worksheet.     name: Sanjiv Bravo  Agency: [employer]  Language: Jose Manuel   Telephone number: [phone]  Type of interpretation: Face-to-face, spoken

## 2020-07-02 NOTE — PATIENT INSTRUCTIONS
Thank you for coming in to clinic today.    We removed your old Nexplanon and inserted a new one.  Please use condoms when having sex for the next week.  Keep the pressure bandage on for 24 hours.  The steri strips underneath will fall off on their own over the next week.    If you have bleeding or swelling or pain and numbness in your fingers, please call the clinic.    Patient Education     Etonogestrel implant  What is this medicine?  ETONOGESTREL (et oh niko CINTHIA trel) is a contraceptive (birth control) device. It is used to prevent pregnancy. It can be used for up to 3 years.  How should I use this medicine?  This device is inserted just under the skin on the inner side of your upper arm by a health care professional.  Talk to your pediatrician regarding the use of this medicine in children. Special care may be needed.  What side effects may I notice from receiving this medicine?  Side effects that you should report to your doctor or health care professional as soon as possible:    allergic reactions like skin rash, itching or hives, swelling of the face, lips, or tongue    breast lumps    changes in emotions or moods    depressed mood    heavy or prolonged menstrual bleeding    pain, irritation, swelling, or bruising at the insertion site    scar at site of insertion    signs of infection at the insertion site such as fever, and skin redness, pain or discharge    signs of pregnancy    signs and symptoms of a blood clot such as breathing problems; changes in vision; chest pain; severe, sudden headache; pain, swelling, warmth in the leg; trouble speaking; sudden numbness or weakness of the face, arm or leg    signs and symptoms of liver injury like dark yellow or brown urine; general ill feeling or flu-like symptoms; light-colored stools; loss of appetite; nausea; right upper belly pain; unusually weak or tired; yellowing of the eyes or skin    unusual vaginal bleeding, discharge    signs and symptoms of a stroke  like changes in vision; confusion; trouble speaking or understanding; severe headaches; sudden numbness or weakness of the face, arm or leg; trouble walking; dizziness; loss of balance or coordination  Side effects that usually do not require medical attention (report to your doctor or health care professional if they continue or are bothersome):    acne    back pain    breast pain    changes in weight    dizziness    general ill feeling or flu-like symptoms    headache    irregular menstrual bleeding    nausea    sore throat    vaginal irritation or inflammation  What may interact with this medicine?  Do not take this medicine with any of the following medications:    amprenavir    fosamprenavir  This medicine may also interact with the following medications:    acitretin    aprepitant    armodafinil    bexarotene    bosentan    carbamazepine    certain medicines for fungal infections like fluconazole, ketoconazole, itraconazole and voriconazole    certain medicines to treat hepatitis, HIV or AIDS    cyclosporine    felbamate    griseofulvin    lamotrigine    modafinil    oxcarbazepine    phenobarbital    phenytoin    primidone    rifabutin    rifampin    rifapentine    [place]'s wort    topiramate  What if I miss a dose?  This does not apply.  Where should I keep my medicine?  This drug is given in a hospital or clinic and will not be stored at home.  What should I tell my health care provider before I take this medicine?  They need to know if you have any of these conditions:    abnormal vaginal bleeding    blood vessel disease or blood clots    breast, cervical, endometrial, ovarian, liver, or uterine cancer    diabetes    gallbladder disease    heart disease or recent heart attack    high blood pressure    high cholesterol or triglycerides    kidney disease    liver disease    migraine headaches    seizures    stroke    tobacco smoker    an unusual or allergic reaction to etonogestrel, anesthetics or  antiseptics, other medicines, foods, dyes, or preservatives    pregnant or trying to get pregnant    breast-feeding  What should I watch for while using this medicine?  This product does not protect you against HIV infection (AIDS) or other sexually transmitted diseases.  You should be able to feel the implant by pressing your fingertips over the skin where it was inserted. Contact your doctor if you cannot feel the implant, and use a non-hormonal birth control method (such as condoms) until your doctor confirms that the implant is in place. Contact your doctor if you think that the implant may have broken or become bent while in your arm.  You will receive a user card from your health care provider after the implant is inserted. The card is a record of the location of the implant in your upper arm and when it should be removed. Keep this card with your health records.  NOTE:This sheet is a summary. It may not cover all possible information. If you have questions about this medicine, talk to your doctor, pharmacist, or health care provider. Copyright  2019 [employer]

## 2021-04-21 NOTE — PROGRESS NOTES
Dr. Jesusita Lyons at the bedside with ultrasound to confirm vertex position X 2
I have reviewed and agree with the behavioral health fellow's documentation for this visit.  I did not personally see the patient.  Marie Sweet, PhD., LP    
[facility] Follow Up Note    Length of visit: 15 minutes  Others present:  (PASQUALE Branch Burmese), Dr. Ibarra (resident on  rotation)  Number of visits post initial assessment: 3    Identifying Information and Presenting Problem:  The patient is a 31 year old  female who was [facility] by Dr. Ibarra for help with reducing tobacco use during pregnancy.    Visit Summary:  Has continued to maintain use of betel nut at 4x/day, and now only including tobacco with betel nut 2 of the times in the day. Since last visit she talked with her  and father about reducing betel nut and they agreed - this is good timing for the request, as they typically abstain while fasting during Ramadan. Sampson also plans to fast during Ramadan, which starts tonight. Plans to totally abstain from betel nut during Ramadan because use while fasting makes her feel dizzy. She is most concerned about being tempted to use during the first few days of Ramadan because her main coping strategy when feeling cravings is chewing gum, which she will not be able to do while fasting. She plans to draw upon her ishaan during those times.    Agreed to continue follow-up while she is in clinic to see her PCP for OB visits, as her visits overlap with my availability during [facility].      Objective: Pt appears awake and alert for today's visit.       Diagnosis: Tobacco Abuse    Plan:  1. Follow-up during [facility], when Sampson is in clinic to see her PCP - on 6/1/17 and 6/15/17. If more visits needed after that, will arrange follow-up.  2. Abstain from tobacco+betel nut during Ramadan.      
no

## 2021-06-22 ENCOUNTER — IMMUNIZATION (OUTPATIENT)
Dept: FAMILY MEDICINE | Facility: CLINIC | Age: 36
End: 2021-06-22
Payer: COMMERCIAL

## 2021-06-22 PROCEDURE — 91301 PR COVID VAC MODERNA 100 MCG/0.5 ML IM: CPT

## 2021-06-22 PROCEDURE — 0011A PR COVID VAC MODERNA 100 MCG/0.5 ML IM: CPT

## 2021-07-02 ENCOUNTER — OFFICE VISIT (OUTPATIENT)
Dept: FAMILY MEDICINE | Facility: CLINIC | Age: 36
End: 2021-07-02
Payer: COMMERCIAL

## 2021-07-02 VITALS
BODY MASS INDEX: 28.75 KG/M2 | SYSTOLIC BLOOD PRESSURE: 110 MMHG | DIASTOLIC BLOOD PRESSURE: 74 MMHG | HEIGHT: 59 IN | HEART RATE: 90 BPM | RESPIRATION RATE: 14 BRPM | TEMPERATURE: 98.5 F | WEIGHT: 142.6 LBS

## 2021-07-02 DIAGNOSIS — L72.11 PILAR AND TRICHILEMMAL CYSTS: Primary | ICD-10-CM

## 2021-07-02 DIAGNOSIS — L72.12 PILAR AND TRICHILEMMAL CYSTS: Primary | ICD-10-CM

## 2021-07-02 PROCEDURE — 99213 OFFICE O/P EST LOW 20 MIN: CPT | Mod: GC | Performed by: FAMILY MEDICINE

## 2021-07-02 ASSESSMENT — MIFFLIN-ST. JEOR: SCORE: 1247.46

## 2021-07-02 NOTE — PROGRESS NOTES
"Preceptor attestation:  Vital signs reviewed: /74   Pulse 90   Temp 98.5  F (36.9  C)   Resp 14   Ht 1.499 m (4' 11\")   Wt 64.7 kg (142 lb 9.6 oz)   BMI 28.80 kg/m      Patient seen, evaluated, and discussed with the resident.  I have verified the content of the note, which accurately reflects my assessment of the patient and the plan of care.    Supervising physician: Mabel James MD  [facility]  "

## 2021-07-02 NOTE — NURSING NOTE
Due to patient being non-English speaking/uses sign language, an  was used for this visit. Only for face-to-face interpretation by an external agency, date and length of interpretation can be found on the scanned worksheet.     name: Sanjiv Bravo  Agency: [employer]  Language: Jose Manuel   Telephone number: [phone]  Type of interpretation: Face-to-face, spoken

## 2021-07-02 NOTE — PROGRESS NOTES
S: Sampson Fonseca is a 35 year old female with a PMH of   Patient Active Problem List   Diagnosis     Language barrier, cultural differences     Need for varicella vaccine     Tobacco abuse     Betel deposit on teeth     Sebaceous cyst     Migraine     Insomnia, unspecified type     presenting to clinic today with a chief complaint of bumps on head.    Last 4-5 years had aching bumps on scalp. Sometimes it's so painful it's affecting her vision.  She does have a past history of migraines and I tried to clarify with her whether the pain is in her head in general or on just the areas of the bumps and she does say that the pain starts in the areas of the bone and is very painful.  When she was living in [place] she had one cut, doesn't remember how long ago.  She says the pain in that 1 has gotten better but she does still sometimes have pain radiating from the spine.    The bumps are not itching.  They have stayed the same spots and if not moved anywhere and has not had any new ones develop.    Current Outpatient Medications   Medication Sig Dispense Refill     etonogestrel (IMPLANON/NEXPLANON) 68 MG IMPL 1 each (68 mg) by Subdermal route continuous  0     acetaminophen (TYLENOL) 325 MG tablet Take 2 tablets (650 mg) by mouth every 4 hours as needed for mild pain (Patient not taking: Reported on 7/2/2021) 100 tablet 0     carboxymethylcellul-glycerin (OPTIVE/REFRESH OPTIVE) 0.5-0.9 % SOLN ophthalmic solution Place 1 drop into both eyes 3 times daily as needed for dry eyes (Patient not taking: Reported on 9/11/2019) 10 mL 3     cetirizine (ZYRTEC) 10 MG tablet Take 1 tablet (10 mg) by mouth daily (Patient not taking: Reported on 7/2/2021) 30 tablet 3     fluticasone (FLONASE) 50 MCG/ACT nasal spray Spray 1 spray into both nostrils daily (Patient not taking: Reported on 7/2/2021) 15.8 mL 3     ibuprofen (ADVIL/MOTRIN) 200 MG tablet Take 2-3 tablets (400-600 mg) by mouth every 6 hours as needed for mild pain (Patient not  "taking: Reported on 7/2/2021) 120 tablet 1     melatonin 3 MG tablet Take 1 tablet (3 mg) by mouth nightly as needed for sleep (Patient not taking: Reported on 7/2/2021) 30 tablet 11     olopatadine (PATANOL) 0.1 % ophthalmic solution Place 1 drop into both eyes 2 times daily (Patient not taking: Reported on 7/2/2021) 5 mL 5     rizatriptan (MAXALT) 5 MG tablet Take 5 mg by mouth at onset of headache for migraine       topiramate (TOPAMAX) 25 MG tablet Take 25 mg by mouth At Bedtime         O: /74   Pulse 90   Temp 98.5  F (36.9  C)   Resp 14   Ht 1.499 m (4' 11\")   Wt 64.7 kg (142 lb 9.6 oz)   BMI 28.80 kg/m     Gen:  Well nourished and in no acute distress   HEENT: Normocephalic atraumatic.  Pupils normal.  Skin: two soft fluid-filled round cystic masses about 1.5 cm in diameter present on scalp in two areas  On top of head near center of scalp and another area of scar tissue felt nearer to the occiput where patient says another one was removed.  Areas are mildly tender to palpation and no surrounding erythema or hair loss.  Neuro: Grossly neuro intact, intact sensation and strength.  Normal gait.  No slurred speech or facial asymmetry.  Psych: Euthymic      Assessment and Plan:  Sampson was seen today for hair/scalp problem.    Diagnoses and all orders for this visit:    Pilar and trichilemmal cysts  The lesions do seem to be cystic in nature.  Do not currently looking like they are inflamed although they are a little tender to palpation.  Discussed with patient that I am not entirely sure that removing these would reduce her pain and I am highly suspicious that she may be having underlying headaches that have nothing to do with the cyst.  However she is adamant that she would like them removed which I think is fair given their location and likely are causing some tenderness especially if they are bumped.    She will schedule a 40-minute appointment at some point in the future to have these " removed.      This patient was seen and discussed with Dr. James who agrees with the assessment and plan.     Maria Del Carmen Engle MD MD PGY2  [facility]

## 2021-07-14 ENCOUNTER — OFFICE VISIT (OUTPATIENT)
Dept: FAMILY MEDICINE | Facility: CLINIC | Age: 36
End: 2021-07-14
Payer: COMMERCIAL

## 2021-07-14 VITALS
BODY MASS INDEX: 29.12 KG/M2 | SYSTOLIC BLOOD PRESSURE: 123 MMHG | HEART RATE: 91 BPM | TEMPERATURE: 98.2 F | RESPIRATION RATE: 18 BRPM | WEIGHT: 144.2 LBS | DIASTOLIC BLOOD PRESSURE: 86 MMHG

## 2021-07-14 DIAGNOSIS — L72.3 SEBACEOUS CYST: ICD-10-CM

## 2021-07-14 DIAGNOSIS — Z53.9 DIAGNOSIS NOT YET DEFINED: ICD-10-CM

## 2021-07-14 PROBLEM — Z34.90 PREGNANT: Status: RESOLVED | Noted: 2017-06-16 | Resolved: 2017-06-17

## 2021-07-14 PROCEDURE — 11421 EXC H-F-NK-SP B9+MARG 0.6-1: CPT | Mod: GC | Performed by: FAMILY MEDICINE

## 2021-07-14 NOTE — PROGRESS NOTES
Subjective: Sampson QUACH Mo a 35 year old female who presents today for sebaceous removal. The lesion is located on the scalp (anterior and to the left of occiput)  and measures 1 cm in diameter.  She denies other significant symptoms on ROS. Medications reviewed. Informed consent was obtained for procedure.     Objective: Time out was performed. The area was prepped and appropriately anesthetized. Using the usual technique, cyst incision and removal was performed. The total area excised, with intact cyst walls. 2 interrupted sutures were placed to close with 4-0 ethylon. The procedure was well tolerated and without complications. Specimen was not sent.    Assessment: sebaceous cyst    Plan: Follow up: Return for suture removal in 7 days. Wound care instructions provided- keep area dry for next 48 hours and avoid washing hair in that area until stitches are removed.  Patient was instructed to be alert for any signs of cutaneous infection.    Maria Del Carmen Engle MD MD PGY2  [facility]    My preceptor, Dr. Krishnamurthy, was present for the duration of the entire procedure.

## 2021-07-14 NOTE — PATIENT INSTRUCTIONS
Do not get that area of your head wet for the next 2 days    Don't wash your hair in that area until the stitches come out    Come back in 1 week for stitches removal

## 2021-07-14 NOTE — PROGRESS NOTES
Preceptor Attestation:    I discussed the patient with the resident and evaluated the patient in person. I was present for and supervised the entire procedure. I have verified the content of the note, which accurately reflects my assessment of the patient and the plan of care.   Supervising Physician:  Sarah Krishnamurthy MD.

## 2021-07-14 NOTE — NURSING NOTE
Due to patient being non-English speaking/uses sign language, an  was used for this visit. Only for face-to-face interpretation by an external agency, date and length of interpretation can be found on the scanned worksheet.     name: Sanjiv Bravo  Agency: [employer]  Language: Jose Manuel   Telephone number: [phone]  Type of interpretation: Face-to-face, spoken

## 2021-07-20 ENCOUNTER — IMMUNIZATION (OUTPATIENT)
Dept: FAMILY MEDICINE | Facility: CLINIC | Age: 36
End: 2021-07-20
Attending: FAMILY MEDICINE
Payer: COMMERCIAL

## 2021-07-20 PROCEDURE — 0012A PR COVID VAC MODERNA 100 MCG/0.5 ML IM: CPT

## 2021-07-20 PROCEDURE — 91301 PR COVID VAC MODERNA 100 MCG/0.5 ML IM: CPT

## 2021-07-23 ENCOUNTER — OFFICE VISIT (OUTPATIENT)
Dept: FAMILY MEDICINE | Facility: CLINIC | Age: 36
End: 2021-07-23
Payer: COMMERCIAL

## 2021-07-23 VITALS
SYSTOLIC BLOOD PRESSURE: 112 MMHG | BODY MASS INDEX: 28.36 KG/M2 | RESPIRATION RATE: 20 BRPM | WEIGHT: 140.4 LBS | OXYGEN SATURATION: 99 % | DIASTOLIC BLOOD PRESSURE: 79 MMHG | HEART RATE: 94 BPM | TEMPERATURE: 98.3 F

## 2021-07-23 DIAGNOSIS — Z48.01 ENCOUNTER FOR CHANGE OR REMOVAL OF SURGICAL WOUND DRESSING: Primary | ICD-10-CM

## 2021-07-23 PROCEDURE — 99024 POSTOP FOLLOW-UP VISIT: CPT | Performed by: FAMILY MEDICINE

## 2021-07-23 NOTE — NURSING NOTE
Due to patient being non-English speaking/uses sign language, an  was used for this visit. Only for face-to-face interpretation by an external agency, date and length of interpretation can be found on the scanned worksheet.     name: Sanjiv Bravo  Agency: [employer]  Language: Jose Manuel   Telephone number: [phone]  Type of interpretation: Face-to-face, spoken

## 2021-07-29 NOTE — PROGRESS NOTES
S: Patient is here today for suture removal.  The patient reports no complications with wound.  Sutures were placed 9 days ago.  Sutures were placed in this office upon removal of scalp cyst.    o: Wound is well healed, no signs of secondary infection.  2 interrupted sutures removed without complication.    A: s/p scalp cyst removal    P: Sutures removed, F/U PRN.

## 2022-02-07 ENCOUNTER — OFFICE VISIT (OUTPATIENT)
Dept: FAMILY MEDICINE | Facility: CLINIC | Age: 37
End: 2022-02-07
Payer: COMMERCIAL

## 2022-02-07 VITALS
RESPIRATION RATE: 20 BRPM | WEIGHT: 141.6 LBS | TEMPERATURE: 99 F | HEART RATE: 102 BPM | OXYGEN SATURATION: 98 % | BODY MASS INDEX: 28.6 KG/M2 | SYSTOLIC BLOOD PRESSURE: 115 MMHG | DIASTOLIC BLOOD PRESSURE: 83 MMHG

## 2022-02-07 DIAGNOSIS — Z87.2 HISTORY OF SEBACEOUS CYST: Primary | ICD-10-CM

## 2022-02-07 DIAGNOSIS — Z23 HIGH PRIORITY FOR 2019-NCOV VACCINE: ICD-10-CM

## 2022-02-07 DIAGNOSIS — Z23 NEED FOR PROPHYLACTIC VACCINATION AND INOCULATION AGAINST INFLUENZA: ICD-10-CM

## 2022-02-07 PROCEDURE — 90471 IMMUNIZATION ADMIN: CPT | Performed by: STUDENT IN AN ORGANIZED HEALTH CARE EDUCATION/TRAINING PROGRAM

## 2022-02-07 PROCEDURE — 0064A COVID-19,PF,MODERNA (18+ YRS BOOSTER .25ML): CPT | Performed by: STUDENT IN AN ORGANIZED HEALTH CARE EDUCATION/TRAINING PROGRAM

## 2022-02-07 PROCEDURE — 90686 IIV4 VACC NO PRSV 0.5 ML IM: CPT | Performed by: STUDENT IN AN ORGANIZED HEALTH CARE EDUCATION/TRAINING PROGRAM

## 2022-02-07 PROCEDURE — 99213 OFFICE O/P EST LOW 20 MIN: CPT | Mod: 25 | Performed by: STUDENT IN AN ORGANIZED HEALTH CARE EDUCATION/TRAINING PROGRAM

## 2022-02-07 PROCEDURE — 91306 COVID-19,PF,MODERNA (18+ YRS BOOSTER .25ML): CPT | Performed by: STUDENT IN AN ORGANIZED HEALTH CARE EDUCATION/TRAINING PROGRAM

## 2022-02-07 NOTE — PROGRESS NOTES
"Assessment & Plan     History of sebaceous cyst  Physical exam is negative for findings that we could consistent with demonstration of sebaceous cyst.  The red area could be secondary to irritation from constant palpation.  With the drainage, it is possible that there was some type of vesicular or other fluid-filled mass that the patient was palpating, that is now draining.  There is no underlying cellulitis that would warrant antibiotics at this time.  I did recommend that she continue to monitor symptoms, and return to clinic for further evaluation of symptoms worsen, she notices of a larger mass, if pain worsens.  She expressed understanding of this.    Need for prophylactic vaccination and inoculation against influenza  Flu shot administered today    High priority for 2019-nCoV vaccine  - COVID-19,PF,MODERNA (18+ Yrs BOOSTER .25mL)    Review of prior external note(s) from - previous office visit for sebaceous cyst removal  20 minutes spent on the date of the encounter doing chart review, history and exam, documentation and further activities per the note       Tobacco Cessation:   reports that she has never smoked. Her smokeless tobacco use includes chew.    Will discuss at later visit    BMI:   Estimated body mass index is 28.6 kg/m  as calculated from the following:    Height as of 7/2/21: 1.499 m (4' 11\").    Weight as of this encounter: 64.2 kg (141 lb 9.6 oz).   Will consider visit    Recommended follow-up if symptoms worsen.    Hannah Boggs MD PGY2  [facility]  Precepted with Dr. Brasher Mo is a 36 year old who presents for the following health issues  accompanied by her son.    HPI     Presents for bump on the top of her left head.  Patient notes that she was seen back in July, and had sebaceous cyst removed at that time.  She notes a similar tenseness to the skin, bump area lateral to her excision site.  She had her children look at this area, saying that they " saw drainage from this area.  To them, they are not unable to notice this bumps that patient is describing.  She denies any ongoing drainage, bleeding, bruising.  She mentions that the skin is somewhat tender at this time.    Review of Systems   Complete ROS normal aside noted in HPI      Objective    /83   Pulse 102   Temp 99  F (37.2  C) (Oral)   Resp 20   Wt 64.2 kg (141 lb 9.6 oz)   LMP 02/04/2022 (Approximate)   SpO2 98%   BMI 28.60 kg/m    Body mass index is 28.6 kg/m .    Physical Exam   GENERAL: healthy, alert and no distress  MS: no gross musculoskeletal defects noted, no edema  SKIN: Skin over left area of the top of her head is intact, there is a single approximately 5 mm well-circumscribed red papule in this area.  Patient describes pain with palpation.  Some serosanguineous drainage.  Well-healed scar medial to this.  ----- Service Performed and Documented by Resident or Fellow ------

## 2022-02-07 NOTE — PROGRESS NOTES
Preceptor Attestation:    I discussed the patient with the resident and evaluated the patient in person. I have verified the content of the note, which accurately reflects my assessment of the patient and the plan of care.   Supervising Physician:  Peter Garcia MD.

## 2022-02-07 NOTE — NURSING NOTE
Due to patient being non-English speaking/uses sign language, an  was used for this visit. Only for face-to-face interpretation by an external agency, date and length of interpretation can be found on the scanned worksheet.     name: Sanjiv Bravo  Agency: [employer]  Language: Jose Manuel   Telephone number: [phone]  Type of interpretation: Face-to-face, spoken

## 2022-05-16 NOTE — MR AVS SNAPSHOT
After Visit Summary   4/26/2017    Sampson Fonseca    MRN: [id number]           Patient Information     Date Of Birth          1985        Visit Information        Provider Department      4/26/2017 1:30 PM Marsha Brown, PhD [facility]        Today's Diagnoses     Tobacco abuse    -  1       Follow-ups after your visit        Follow-up notes from your care team     Return in about 3 weeks (around 5/17/2017).      Your next 10 appointments already scheduled     May 01, 2017  8:20 AM CDT   RETURN OB with Corine Ibarra MD   [facility] ([facility])    [address] [place] [address]   [phone]            May 10, 2017  4:00 PM CDT   Return Visit with Marsha Brown PhD   [facility] ([facility])    [address] [place] [address]   [phone]            May 15, 2017  1:10 PM CDT   RETURN OB with Corine Ibarra MD   [facility] ([facility])    [address] [place] [address]   [phone]              Who to contact     Please call your clinic at [phone] to:    Ask questions about your health    Make or cancel appointments    Discuss your medicines    Learn about your test results    Speak to your doctor   If you have compliments or concerns about an experience at your clinic, or if you wish to file a complaint, please contact [facility] Patient Relations at [phone] or email us at [email or web address]         Additional Information About Your Visit        MyChart Information     SAK Project is an electronic gateway that provides easy, online access to your medical records. With SAK Project, you can request a clinic appointment, read your test results, renew a prescription or communicate with your care team.     To sign up for SAK Project visit the website at [email or web address]   You will be asked to enter the access code listed below, as well as some personal information. Please follow  the directions to create your username and password.     Your access code is: [email or web address]  Expires: 2017  3:18 PM     Your access code will  in 90 days. If you need help or a new code, please contact your [facility] or call [phone] for assistance.        Care EveryWhere ID     This is your Care EveryWhere ID. This could be used by other organizations to access your [facility] medical records  [id number]        Your Vitals Were     Last Period                   2016 (Exact Date)            Blood Pressure from Last 3 Encounters:   17 100/67   17 107/70   17 107/73    Weight from Last 3 Encounters:   17 145 lb 6.4 oz (66 kg)   17 146 lb (66.2 kg)   17 141 lb 6.4 oz (64.1 kg)              We Performed the Following     Health & Behavior Intervention 30 min ([address], 2 units)        Primary Care Provider Office Phone # Fax #    Corine Alivia Ibarra [phone]       [place] [address]        Thank you!     Thank you for choosing [facility]  for your care. Our goal is always to provide you with excellent care. Hearing back from our patients is one way we can continue to improve our services. Please take a few minutes to complete the written survey that you may receive in the mail after your visit with us. Thank you!             Your Updated Medication List - Protect others around you: Learn how to safely use, store and throw away your medicines at www.disposemymeds.org.          This list is accurate as of: 17  7:21 PM.  Always use your most recent med list.                   Brand Name Dispense Instructions for use    acetaminophen 325 MG tablet    TYLENOL    100 tablet    Take 2 tablets (650 mg) by mouth every 4 hours as needed for mild pain       loratadine 10 MG tablet    CLARITIN    30 tablet    Take 1 tablet (10 mg) by mouth daily       metroNIDAZOLE 500 MG  tablet    FLAGYL    14 tablet    Take 1 tablet (500 mg) by mouth 2 times daily       prenatal multivitamin  plus iron 27-0.8 MG Tabs per tablet     100 tablet    Take 1 tablet by mouth daily          No

## 2022-05-19 NOTE — MR AVS SNAPSHOT
After Visit Summary   10/19/2017    Sampson Fonseca    MRN: [id number]           Patient Information     Date Of Birth          1985        Visit Information        Provider Department      10/19/2017 2:10 PM Corine Ibarra MD [facility]        Today's Diagnoses     Encounter for other general counseling or advice on contraception    -  1      Care Instructions    For nexplanon:  1. Schedule 40 minute appointment for insertion in 2 weeks  2. Avoid intercourse or use condoms every time              Follow-ups after your visit        Who to contact     Please call your clinic at [phone] to:    Ask questions about your health    Make or cancel appointments    Discuss your medicines    Learn about your test results    Speak to your doctor   If you have compliments or concerns about an experience at your clinic, or if you wish to file a complaint, please contact [facility] Patient Relations at [phone] or email us at [email or web address]         Additional Information About Your Visit        MyChart Information     Pradamat is an electronic gateway that provides easy, online access to your medical records. With [email or web address], you can request a clinic appointment, read your test results, renew a prescription or communicate with your care team.     To sign up for Pradamat visit the website at [email or web address]   You will be asked to enter the access code listed below, as well as some personal information. Please follow the directions to create your username and password.     Your access code is: [email or web address]  Expires: 2017 11:45 AM     Your access code will  in 90 days. If you need help or a new code, please contact your [facility] or call [phone] for assistance.        Care EveryWhere ID     This is your Care EveryWhere ID. This could be used by other organizations to access your [facility]  Renal function has improved due to improved perfusion while on dopamine.  He is diuresing at a brisk rate.  He has contraction alkalosis   records  [id number]        Your Vitals Were     Pulse Temperature Respirations Pulse Oximetry BMI (Body Mass Index)       85 98.2  F (36.8  C) (Oral) 16 100% 28.59 kg/m2        Blood Pressure from Last 3 Encounters:   10/19/17 115/77   08/31/17 123/76   06/15/17 94/63    Weight from Last 3 Encounters:   10/19/17 138 lb (62.6 kg)   08/31/17 138 lb 12.8 oz (63 kg)   06/15/17 151 lb 9.6 oz (68.8 kg)              We Performed the Following     HCG Qualitative Urine (UPT)  ([facility])        Primary Care Provider Office Phone # Fax #    Corine Alivia Ibarra [phone]       [place] [id number]        Equal Access to Services     GAURAV BOBO : Hadii florentin huang hadasho Soomaali, waaxda luqadaha, qaybta kaalmada adedeeyagaurang, coral kearney . So [facility] [phone].    ATENCIÓN: Si habla español, tiene a devi disposición servicios gratuitos de asistencia lingüística. Shilpi al [phone].    We comply with applicable federal civil rights laws and [place] laws. We do not discriminate on the basis of race, color, national origin, age, disability, sex, sexual orientation, or gender identity.            Thank you!     Thank you for choosing [facility]  for your care. Our goal is always to provide you with excellent care. Hearing back from our patients is one way we can continue to improve our services. Please take a few minutes to complete the written survey that you may receive in the mail after your visit with us. Thank you!             Your Updated Medication List - Protect others around you: Learn how to safely use, store and throw away your medicines at www.disposemymeds.org.          This list is accurate as of: 10/19/17  2:30 PM.  Always use your most recent med list.                   Brand Name Dispense Instructions for use Diagnosis    acetaminophen 325 MG tablet    TYLENOL    100 tablet    Take 2 tablets (650 mg) by mouth every 4 hours  as needed for mild pain    Supervision of other high risk pregnancies, third trimester       prenatal multivitamin plus iron 27-0.8 MG Tabs per tablet     100 tablet    Take 1 tablet by mouth daily    Supervision of other high risk pregnancies, third trimester

## 2022-06-02 NOTE — NURSING NOTE
name: Sanjiv Bravo  Language: Cymraes  Agency: [employer]  Phone number: [phone]   [facility] Call Center    Phone Message    May a detailed message be left on voicemail: no     Reason for Call: Other: [facility] with Dr Mar is calling to schedule appt with Dr Hurst.  There is no schedule in  for patient and [facility] is asking for a call back to the patient for scheduling.      Pleural effusion [J90]  Hemoptysis [R04.2]    Action Taken: Message routed to:  Adult Clinics: Pulmonology p [phone]    Travel Screening: Not Applicable

## 2022-10-24 NOTE — NURSING NOTE
Due to patient being non-English speaking/uses sign language, an  was used for this visit. Only for face-to-face interpretation by an external agency, date and length of interpretation can be found on the scanned worksheet.     name: Sanjiv Bravo  Agency: [employer]  Language: Jose Manuel   Telephone number: [phone]  Type of interpretation: Face-to-face, spoken    
Specialty Care (Immediate)...

## 2023-01-20 NOTE — LETTER
April 13, 2017      Sampson Fonseca  [address]  [place] [address]        Dear Sampson,    Please see below for your test results.  Thank you for seeing me again in clinic.  Here are the lab results from our most recent visit.  Your screening test for diabetes in pregnancy was negative, this is good news.  Your urine tests were negative for infection, which is also good. Please call the clinic if you have questions about these results.  Otherwise, we can discuss them at your next appointment.    Resulted Orders   Urinalysis([facility])   Result Value Ref Range    Specific Gravity Urine 1.010 1.005 - 1.030    pH Urine 6.5 4.5 - 8.0    Leukocyte Esterase UR Negative NEGATIVE    Nitrite Urine Negative NEGATIVE    Protein UR Negative NEGATIVE    Glucose Urine Negative NEGATIVE    Ketones Urine Negative NEGATIVE    Urobilinogen mg/dL 0.2 E.U./dL 0.2 E.U./dL    Bilirubin UR Negative NEGATIVE    Blood UR Negative NEGATIVE   Urine Culture ([facility])   Result Value Ref Range    Culture SEE RESULTS BELOW       Comment:      CULTURE, URINE   SOURCE: Urine, Clean Catch   CULTURE RESULTS:    No Growth      Narrative    Test performed by:  [facility] [address] [place] [address]   Glucose Challenge  1 Hr  ([facility])   Result Value Ref Range    Glu Gest Screen 1hr 50g 101 0 - 129       If you have any questions, please call the clinic to make an appointment.    Sincerely,    Corine Ibarra MD  
170.18

## 2023-06-29 ENCOUNTER — OFFICE VISIT (OUTPATIENT)
Dept: FAMILY MEDICINE | Facility: CLINIC | Age: 38
End: 2023-06-29
Payer: COMMERCIAL

## 2023-06-29 VITALS
BODY MASS INDEX: 27.27 KG/M2 | OXYGEN SATURATION: 99 % | DIASTOLIC BLOOD PRESSURE: 85 MMHG | SYSTOLIC BLOOD PRESSURE: 118 MMHG | WEIGHT: 135 LBS | TEMPERATURE: 97.9 F | HEART RATE: 96 BPM | RESPIRATION RATE: 24 BRPM

## 2023-06-29 DIAGNOSIS — Z30.46 SURVEILLANCE OF PREVIOUSLY PRESCRIBED IMPLANTABLE SUBDERMAL CONTRACEPTIVE: Primary | ICD-10-CM

## 2023-06-29 PROCEDURE — 99207 PR DROP WITH A PROCEDURE: CPT | Performed by: STUDENT IN AN ORGANIZED HEALTH CARE EDUCATION/TRAINING PROGRAM

## 2023-06-29 PROCEDURE — 11982 REMOVE DRUG IMPLANT DEVICE: CPT | Mod: GC | Performed by: STUDENT IN AN ORGANIZED HEALTH CARE EDUCATION/TRAINING PROGRAM

## 2023-06-29 NOTE — PROGRESS NOTES
Preceptor Attestation:  Vitals:    06/29/23 1606   BP: 118/85   BP Location: Right arm   Patient Position: Sitting   Cuff Size: Adult Regular   Pulse: 96   Resp: 24   Temp: 97.9  F (36.6  C)   TempSrc: Oral   SpO2: 99%   Weight: 61.2 kg (135 lb)          I discussed the patient with the resident and evaluated the patient in person. I was present for and supervised the entire procedure. I have verified the content of the note, which accurately reflects my assessment of the patient and the plan of care.   Supervising Physician:  Leonela Motta MD

## 2023-06-29 NOTE — PROGRESS NOTES
Procedure Note-Nexplanon Removal    Sampson Fonseca is a patient of Martita Mckinley here for removal of etonogestrel implant Nexplanon/Implanon    Indication: Had inserted about 3 years ago, no concerns for side effects    Consent: Affirmation of informed consent was signed and scanned into the medical record. Risks, benefits and alternatives were discussed. Patient's questions were elicited and answered.   Procedure safety checklist was completed:  Yes  Time Out (Pause for the Cause) completed: Yes      Preoperative Diagnosis: etonogestrel implant  Postoperative Diagnosis: etonogestrel implant removed    Technique:   Skin prep Betadine  Anesthesia 1% lidocaine, with epi  Procedure: Small incision (<5mm) was made at distal end of palpable implant, curved hemostat was used to isolate the implant and bring it to the incision, the fibrous capsule containing the implant  was incised and the Implant was removed intact.  EBL: minimal  Complications:  No  Tolerance:  Pt tolerated procedure well and was in stable condition.     Contraception was discussed and patient chose the following method none      Follow up: Pt was instructed to call if bleeding, severe pain or foul smell.     Follow up in 2-4 weeks.      Resident: Hannah Boggs MD PGY3  Faculty: Dr. Kalia MD present for and supervised this entire procedure.

## 2023-08-18 ENCOUNTER — HOSPITAL ENCOUNTER (EMERGENCY)
Facility: HOSPITAL | Age: 38
Discharge: HOME | End: 2023-08-19
Admitting: EMERGENCY MEDICINE
Payer: COMMERCIAL

## 2023-08-18 DIAGNOSIS — U07.1 COVID-19: ICD-10-CM

## 2023-08-18 PROCEDURE — 87637 SARSCOV2&INF A&B&RSV AMP PRB: CPT | Performed by: EMERGENCY MEDICINE

## 2023-08-18 PROCEDURE — 99283 EMERGENCY DEPT VISIT LOW MDM: CPT

## 2023-08-18 PROCEDURE — 250N000013 HC RX MED GY IP 250 OP 250 PS 637: Performed by: EMERGENCY MEDICINE

## 2023-08-18 RX ORDER — IBUPROFEN 600 MG/1
600 TABLET, FILM COATED ORAL ONCE
Status: COMPLETED | OUTPATIENT
Start: 2023-08-18 | End: 2023-08-18

## 2023-08-18 RX ORDER — ACETAMINOPHEN 325 MG/1
975 TABLET ORAL ONCE
Status: DISCONTINUED | OUTPATIENT
Start: 2023-08-18 | End: 2023-08-18

## 2023-08-18 RX ADMIN — IBUPROFEN 600 MG: 600 TABLET ORAL at 23:27

## 2023-08-18 ASSESSMENT — ENCOUNTER SYMPTOMS
SORE THROAT: 1
DIARRHEA: 0
HEMATURIA: 0
COUGH: 1
FEVER: 1
NAUSEA: 0
CHILLS: 1
VOMITING: 0
ABDOMINAL PAIN: 0
MYALGIAS: 1
SHORTNESS OF BREATH: 0
DYSURIA: 0
HEADACHES: 1

## 2023-08-18 NOTE — Clinical Note
Sampson Fonseca was seen and treated in our emergency department on 8/18/2023.  She may return to work on 08/23/2023.       If you have any questions or concerns, please don't hesitate to call.      Chanell Rojo PA-C

## 2023-08-19 VITALS
BODY MASS INDEX: 26.96 KG/M2 | HEART RATE: 113 BPM | WEIGHT: 133.5 LBS | DIASTOLIC BLOOD PRESSURE: 70 MMHG | OXYGEN SATURATION: 99 % | TEMPERATURE: 99.5 F | RESPIRATION RATE: 22 BRPM | SYSTOLIC BLOOD PRESSURE: 116 MMHG

## 2023-08-19 LAB
FLUAV RNA SPEC QL NAA+PROBE: NEGATIVE
FLUBV RNA RESP QL NAA+PROBE: NEGATIVE
RSV RNA SPEC NAA+PROBE: NEGATIVE
SARS-COV-2 RNA RESP QL NAA+PROBE: POSITIVE

## 2023-08-19 NOTE — ED PROVIDER NOTES
EMERGENCY DEPARTMENT ENCOUNTER      NAME: Sampson Fonseca  AGE: 38 year old female  YOB: 1985  MRN: [id number]  EVALUATION DATE & TIME: 8/18/2023 11:20 PM    PCP: Martita Cabrera    ED PROVIDER: Chanell Rojo PA-C      Chief Complaint   Patient presents with    Fever    Headache    Generalized Body Aches         FINAL IMPRESSION:  1. COVID-19          ED COURSE & MEDICAL DECISION MAKING:    Pertinent Labs & Imaging studies reviewed. (See chart for details)    38 year old female presents to the Emergency Department for evaluation of fever.    Physical exam is remarkable for a generally well-appearing female who is in no acute distress.  Heart and lung sounds are clear diffusely throughout.  Abdomen is soft and nontender.  Oropharynx is unremarkable appearing, betel nut extract noted on the teeth. No meningismus noted, patient moves all extremities without difficulty. Vital signs remarkable for tachycardia and borderline fever of 99.5F initially; improved after anti-pyretics. Remainder are stable and she is afebrile.     COVID test was positive.    The patient was given ibuprofen with improvement in her symptoms.  I do not think any further emergent labs or imaging are indicated at this time.  She is hemodynamically stable here and does not appear to be in respiratory distress.  No meningismus on exam, significant shortness of breath, or chest pain.  Symptoms are consistent with COVID-19, I do not think antiviral medications are indicated at this time as she is fairly healthy and vaccinated.  Recommend over-the-counter medications for symptom management, work note was provided.  Advised follow-up with her primary care provider next week for recheck and return here for any new or worsening symptoms.  The patient is agreeable with this treatment plan and verbalized her understanding.    Medical Decision Making    History:  Supplemental history from: Family Member/Significant Other  External Record(s) reviewed:  Documented in chart, if applicable.    Work Up:  Chart documentation includes differential considered and any EKGs or imaging independently interpreted by provider, where specified.  In additional to work up documented, I considered the following work up: Labs CBC, blood cultures and Imaging XR, but deferred due to positive covid test with normal vital signs.    External consultation:  Discussion of management with another provider: Documented in chart, if applicable    Complicating factors:  Care impacted by chronic illness: N/A  Care affected by social determinants of health: N/A    Disposition considerations: Discharge. No recommendations on prescription strength medication(s). N/A.    ED Course   11:21 PM Performed my initial history and physical exam. Discussed workup in the emergency department, management of symptoms, and likely disposition.   12:19 AM Updated with test results. I discussed the plan for discharge with the patient or family and they are agreeable.. We discussed supportive cares at home and reasons for return to the ER including new or worsening symptoms - all questions and concerns addressed. Patient to be discharged by RN.    At the conclusion of the encounter I discussed the results of all of the tests and the disposition. The questions were answered. The patient or family acknowledged understanding and was agreeable with the care plan.     Voice recognition software was used in the creation of this note. Any grammatical or nonsensical errors are due to inherent errors with the software and are not the intention of the writer.     MEDICATIONS GIVEN IN THE EMERGENCY:  Medications   ibuprofen (ADVIL/MOTRIN) tablet 600 mg (600 mg Oral $Given 8/18/23 7467)       NEW PRESCRIPTIONS STARTED AT TODAY'S ER VISIT  New Prescriptions    No medications on file            =================================================================    HPI    Patient information was obtained from: Patient    Use of  : SisterRussel Richard (Declined professional )        Sampson Fonseca is a 38 year old female who presents to the ED via walk-in for evaluation of a fever.    Last night, the patient developed multiple symptoms including a headache, body aches, chills, and fever.  She has a mildly sore throat, and intermittent cough, and a runny nose.  She has been taking Tylenol with temporary improvement in her symptoms.  No known sick contacts.  She did receive an initial vaccine series for COVID-19.    She denies any chest pain, difficulty breathing, abdominal pain, nausea, vomiting, diarrhea, dysuria, or hematuria.      REVIEW OF SYSTEMS   Review of Systems   Constitutional:  Positive for chills and fever.   HENT:  Positive for congestion and sore throat.    Respiratory:  Positive for cough. Negative for shortness of breath.    Cardiovascular:  Negative for chest pain.   Gastrointestinal:  Negative for abdominal pain, diarrhea, nausea and vomiting.   Genitourinary:  Negative for dysuria and hematuria.   Musculoskeletal:  Positive for myalgias.   Neurological:  Positive for headaches.       All other systems reviewed and are negative unless noted in HPI.      PAST MEDICAL HISTORY:  Past Medical History:   Diagnosis Date    NO ACTIVE PROBLEMS        PAST SURGICAL HISTORY:  Past Surgical History:   Procedure Laterality Date    NO HISTORY OF SURGERY      NO PAST SURGERIES         CURRENT MEDICATIONS:    etonogestrel (IMPLANON/NEXPLANON) 68 MG IMPL        ALLERGIES:  No Known Allergies    FAMILY HISTORY:  Family History   Problem Relation Age of Onset    Cancer Mother     Throat cancer Mother     Diabetes Father     No Known Problems Maternal Grandmother     No Known Problems Maternal Grandfather     No Known Problems Paternal Grandmother     No Known Problems Paternal Grandfather     No Known Problems Brother     No Known Problems Sister     No Known Problems Son     No Known Problems Daughter     No Known Problems  Maternal Half-Brother     No Known Problems Maternal Half-Sister     No Known Problems Paternal Half-Brother     No Known Problems Paternal Half-Sister     No Known Problems Niece     No Known Problems Nephew     No Known Problems Cousin     No Known Problems Other     Heart Disease No family hx of     Coronary Artery Disease No family hx of     Hypertension No family hx of     Hyperlipidemia No family hx of     Kidney Disease No family hx of     Cerebrovascular Disease No family hx of     Obesity No family hx of     Thrombosis No family hx of     Asthma No family hx of     Arthritis No family hx of     Thyroid Disease No family hx of     Depression No family hx of     Mental Illness No family hx of     Substance Abuse No family hx of     Cystic Fibrosis No family hx of     Early Death No family hx of     Coronary Artery Disease Early Onset No family hx of     Heart Failure No family hx of     Bleeding Diathesis No family hx of     Dementia No family hx of     Breast Cancer No family hx of     Ovarian Cancer No family hx of     Uterine Cancer No family hx of     Prostate Cancer No family hx of     Colorectal Cancer No family hx of     Pancreatic Cancer No family hx of     Lung Cancer No family hx of     Melanoma No family hx of     Autoimmune Disease No family hx of     Unknown/Adopted No family hx of     Genetic Disorder No family hx of        SOCIAL HISTORY:   Social History     Socioeconomic History    Marital status:    Occupational History    Occupation: homemaker   Tobacco Use    Smoking status: Never    Smokeless tobacco: Current     Types: Chew   Substance and Sexual Activity    Alcohol use: No    Drug use: No    Sexual activity: Yes     Partners: Male     Birth control/protection: None       VITALS:  Patient Vitals for the past 24 hrs:   BP Temp Temp src Pulse Resp SpO2 Weight   08/18/23 2316 123/72 99.5  F (37.5  C) Oral 113 22 98 % 60.6 kg (133 lb 8 oz)       PHYSICAL EXAM    VITAL SIGNS: /72    Pulse 113   Temp 99.5  F (37.5  C) (Oral)   Resp 22   Wt 60.6 kg (133 lb 8 oz)   SpO2 98%   BMI 26.96 kg/m    General Appearance: Alert, cooperative, normal speech and facial symmetry, appears stated age, the patient does not appear in distress  Head:  Normocephalic, without obvious abnormality, atraumatic  Eyes: Conjunctiva/corneas clear, EOM's intact, no nystagmus, PERRL  ENT:  Lips, mucosa, and tongue normal; teeth and gums normal, no pharyngeal inflammation, no dysphonia or difficulty swallowing, membranes are moist without pallor  Cardio:  Regular rate and rhythm, S1 and S2 normal, no murmur, rub    or gallop, 2+ pulses symmetric in all extremities  Pulm:  Clear to auscultation bilaterally, respirations unlabored with no accessory muscle use  Abdomen:  Abdomen is soft, non-distended with no tenderness to palpation, rebound tenderness, or guarding.   Extremities: Moves all extremities  Neuro: Patient is awake, alert, and responsive to voice. No gross motor weaknesses or sensory loss; moves all extremities.    LAB:  All pertinent labs reviewed and interpreted.  Labs Ordered and Resulted from Time of ED Arrival to Time of ED Departure   INFLUENZA A/B, RSV, & SARS-COV2 PCR - Abnormal       Result Value    Influenza A PCR Negative      Influenza B PCR Negative      RSV PCR Negative      SARS CoV2 PCR Positive (*)        RADIOLOGY:  Reviewed all pertinent imaging. Please see official radiology report.  No orders to display         Chanell Rojo PA-C  Emergency Medicine  [facility] EMERGENCY DEPARTMENT  [facility] [facility] [id number] [phone]  Dept: [phone]       Chanell Rojo PA-C  08/19/23 0023

## 2023-08-19 NOTE — DISCHARGE INSTRUCTIONS
You were seen here today for evaluation of fever.  Your COVID test was positive, this is the cause of your symptoms.    You may take Tylenol and ibuprofen for pain/fever, do not exceed 4000 mg of Tylenol per day or 3200 mg ofibuprofen per day.    You may return to work on Wednesday.  You should wear a mask in public for 5 days after returning to work.    Follow-up with your primary care provider in 1 week for a recheck.  Return here for any new or worsening symptoms including severe pain, difficulty breathing, coughing up blood, fever despite medications, or any other symptoms that concern you.

## 2023-08-19 NOTE — ED NOTES
Discharge    Discharge paperwork discussed. Patient questions answered. AVS in hand. Patient discharged from ED.

## 2023-08-19 NOTE — ED TRIAGE NOTES
Pt c/o headache, body aches and fever since yesterday. Took Tylenol at 9pm today. No c/o N/V.     Triage Assessment       Row Name 08/18/23 [id number]       Triage Assessment (Adult)    Airway WDL WDL       Respiratory WDL    Respiratory WDL WDL       Skin Circulation/Temperature WDL    Skin Circulation/Temperature WDL WDL       Cardiac WDL    Cardiac WDL WDL       Peripheral/Neurovascular WDL    Peripheral Neurovascular WDL WDL       Cognitive/Neuro/Behavioral WDL    Cognitive/Neuro/Behavioral WDL WDL

## 2023-09-01 ENCOUNTER — OFFICE VISIT (OUTPATIENT)
Dept: FAMILY MEDICINE | Facility: CLINIC | Age: 38
End: 2023-09-01
Payer: COMMERCIAL

## 2023-09-01 VITALS
WEIGHT: 135.8 LBS | HEART RATE: 87 BPM | SYSTOLIC BLOOD PRESSURE: 103 MMHG | HEIGHT: 59 IN | RESPIRATION RATE: 20 BRPM | DIASTOLIC BLOOD PRESSURE: 67 MMHG | TEMPERATURE: 98.9 F | BODY MASS INDEX: 27.38 KG/M2 | OXYGEN SATURATION: 100 %

## 2023-09-01 DIAGNOSIS — Z32.00 PREGNANCY EXAMINATION OR TEST, PREGNANCY UNCONFIRMED: Primary | ICD-10-CM

## 2023-09-01 LAB — HCG UR QL: POSITIVE

## 2023-09-01 PROCEDURE — 81025 URINE PREGNANCY TEST: CPT

## 2023-09-01 PROCEDURE — 99213 OFFICE O/P EST LOW 20 MIN: CPT | Mod: GC

## 2023-09-01 RX ORDER — PRENATAL VIT/IRON FUM/FOLIC AC 27MG-0.8MG
1 TABLET ORAL DAILY
Qty: 90 TABLET | Refills: 3 | Status: SHIPPED | OUTPATIENT
Start: 2023-09-01

## 2023-09-01 NOTE — PROGRESS NOTES
"  Assessment & Plan     1. Pregnancy examination or test, pregnancy unconfirmed  Patient presenting with positive home pregnancy test, positive UPT in clinic.  LMP unknown though approximately second week in July, patient is approximately 6 to 7 weeks gestation.  This would be fifth pregnancy, patient reports previous pregnancies were uncomplicated, patient is in good health.  No concerns at this time.  Pregnancy was desired.  Has not started prenatal vitamins yet.  - HCG qualitative urine; Future  - Prenatal Vit-Fe Fumarate-FA (PRENATAL MULTIVITAMIN W/IRON) 27-0.8 MG tablet; Take 1 tablet by mouth daily  Dispense: 90 tablet; Refill: 3  -Message to RN for early ultrasound schedule and schedule for first prenatal visit  -Phone number confirmed    Chanelle Alves MD  [facility]    Subjective   Sampson is a 38 year old, presenting for the following health issues:  Pregnancy Test (Upt test, positive test at home.  No std check)    HPI     LMP: Approx 2nd week    Positive pregnancy test. This was wanted.     4 Previous pregnancies, this would be 5th. No       Review of Systems   As above      Objective    /67   Pulse 87   Temp 98.9  F (37.2  C) (Oral)   Resp 20   Ht 1.491 m (4' 10.7\")   Wt 61.6 kg (135 lb 12.8 oz)   SpO2 100%   BMI 27.71 kg/m    Body mass index is 27.71 kg/m .  Physical Exam   Physical Exam  Constitutional:       General: No acute distress.     Appearance: Normal appearance. Patient not ill-appearing or diaphoretic.   Eyes:      Extraocular Movements: Extraocular movements intact.      Conjunctiva/sclera: Conjunctivae normal.   Pulmonary:      Effort: Pulmonary effort is normal. No respiratory distress.   Neurological:      General: No focal deficit present.      Mental Status: Patient is alert and oriented to person, place, and time.      Comments: No gross deficits appreciated   Psychiatric:         Attention and Perception: Attention normal.         Mood and Affect: Mood " normal.         Speech: Speech normal.         Behavior: Behavior normal.

## 2023-09-01 NOTE — PROGRESS NOTES
Preceptor Attestation:    I discussed the patient with the resident and evaluated the patient in person. I have verified the content of the note, which accurately reflects my assessment of the patient and the plan of care.   Supervising Physician:  Shimon Dexter MD.

## 2023-09-11 ENCOUNTER — OFFICE VISIT (OUTPATIENT)
Dept: FAMILY MEDICINE | Facility: CLINIC | Age: 38
End: 2023-09-11
Payer: COMMERCIAL

## 2023-09-11 VITALS
BODY MASS INDEX: 28.32 KG/M2 | TEMPERATURE: 98.5 F | OXYGEN SATURATION: 100 % | RESPIRATION RATE: 20 BRPM | WEIGHT: 138.8 LBS | DIASTOLIC BLOOD PRESSURE: 76 MMHG | HEART RATE: 82 BPM | SYSTOLIC BLOOD PRESSURE: 112 MMHG

## 2023-09-11 DIAGNOSIS — Z12.4 CERVICAL CANCER SCREENING: ICD-10-CM

## 2023-09-11 DIAGNOSIS — Z11.59 NEED FOR HEPATITIS C SCREENING TEST: ICD-10-CM

## 2023-09-11 DIAGNOSIS — O20.9 VAGINAL BLEEDING IN PREGNANCY, FIRST TRIMESTER: Primary | ICD-10-CM

## 2023-09-11 PROCEDURE — 99214 OFFICE O/P EST MOD 30 MIN: CPT | Mod: GC

## 2023-09-11 NOTE — Clinical Note
Hello, FYI this patient is following up with you Monday for vaginal bleeding at 7 weeks of pregnancy. I saw her today and ordered hcg and US. Lmk if questions!

## 2023-09-11 NOTE — PROGRESS NOTES
Assessment & Plan   Patient is a  38 year old female at 7 weeks gestation here for a ER followup for vaginal spotting.     Vaginal bleeding in pregnancy, first trimester  She is here for ER followup for 2 days of vaginal spotting that started evening of 9/3/23.  Reviewed ER records and labs.  Patient went to the ER on the evening of 23. Blood type was B Positive, Hgb was 12.8, bHCG was 23,710 on 23 which is consistent with 6-week gestation. Her bleeding completely resolved after 23.  Bleeding was minimal.  At the time of bleeding was hemodynamically stable and patient continues to be.  Transvaginal ultrasound showing intrauterine pregnancy, no heartbeat noted however possibly related to early gestation.  Plan will be to repeat fetal ultrasound and HCG measurement and close followup with results.  Labs and ultrasound scheduled for tomorrow, .  - US OB <14 WKS SINGLE OR FIRST GESTATION; Future  - Beta-HCG Quantitative; Future  -Follow-up within 1 week        Abdiaziz Pierce, MS3    I was present with the medical student who participated in the service and in the documentation of this note. I have verified the history and personally performed the physical exam and medical decision making, and have verified the content of the note, which accurately reflects my assessment of the patient and the plan of care.     Chanelle Alves MD  [facility]    Nicole   Sampson is a 38 year old, presenting for the following health issues:  Hospital F/U (Follow-up er ob spotting)      HPI   Sampson is a  38 year old female here for a ER followup for vaginal spotting. She is doing well today. She is here for 2 days of vaginal spotting that started evening of 9/3/23 before going to the ER on evening of 23. Blood type was B Positive, Hgb was 12.8, bHCG was 23,710 on 23. Her bleeding completely resolved after 23. She was concerned about miscarriage and was informed the baby was fine.      While she was bleeding she passed one 1 cm clot. Saw bleeding in the toilet after peeing. Went through 4 pads over two days. No heavy bleeding. No pain or cramps. Felt a little lightheadedness on Sunday and Monday. No weakness. No sexual intercourse before the bleeding started. This has never happened before.      Review of Systems   Constitutional, HEENT, cardiovascular, pulmonary, gi and gu systems are negative, except as otherwise noted.      Objective    /76   Pulse 82   Temp 98.5  F (36.9  C) (Oral)   Resp 20   Wt 63 kg (138 lb 12.8 oz)   SpO2 100%   BMI 28.32 kg/m    Body mass index is 28.32 kg/m .    Physical Exam  Constitutional:       General: No acute distress.     Appearance: Normal appearance. Patient not ill-appearing or diaphoretic.   Eyes:      Extraocular Movements: Extraocular movements intact.      Conjunctiva/sclera: Conjunctivae normal.   Pulmonary:      Effort: Pulmonary effort is normal. No respiratory distress.   Neurological:      General: No focal deficit present.      Mental Status: Patient is alert and oriented to person, place, and time.      Comments: No gross deficits appreciated   Psychiatric:         Attention and Perception: Attention normal.         Mood and Affect: Mood normal.         Speech: Speech normal.         Behavior: Behavior normal.

## 2023-09-12 ENCOUNTER — HOSPITAL ENCOUNTER (OUTPATIENT)
Dept: ULTRASOUND IMAGING | Facility: HOSPITAL | Age: 38
Discharge: HOME | End: 2023-09-12
Attending: FAMILY MEDICINE | Admitting: FAMILY MEDICINE
Payer: COMMERCIAL

## 2023-09-12 DIAGNOSIS — O20.9 VAGINAL BLEEDING IN PREGNANCY, FIRST TRIMESTER: ICD-10-CM

## 2023-09-12 PROCEDURE — 76801 OB US < 14 WKS SINGLE FETUS: CPT

## 2023-09-13 LAB — RADIOLOGIST FLAGS: ABNORMAL

## 2023-09-18 DIAGNOSIS — O20.9 VAGINAL BLEEDING IN PREGNANCY, FIRST TRIMESTER: Primary | ICD-10-CM

## 2023-09-18 DIAGNOSIS — Z32.00 PREGNANCY EXAMINATION OR TEST, PREGNANCY UNCONFIRMED: ICD-10-CM

## 2023-09-25 ENCOUNTER — ANCILLARY PROCEDURE (OUTPATIENT)
Dept: ULTRASOUND IMAGING | Facility: CLINIC | Age: 38
End: 2023-09-25
Attending: STUDENT IN AN ORGANIZED HEALTH CARE EDUCATION/TRAINING PROGRAM
Payer: COMMERCIAL

## 2023-09-25 DIAGNOSIS — O20.9 VAGINAL BLEEDING IN PREGNANCY, FIRST TRIMESTER: ICD-10-CM

## 2023-09-25 PROCEDURE — 76801 OB US < 14 WKS SINGLE FETUS: CPT | Mod: TC | Performed by: RADIOLOGY

## 2023-09-25 PROCEDURE — 76817 TRANSVAGINAL US OBSTETRIC: CPT | Mod: TC | Performed by: RADIOLOGY

## 2023-09-25 NOTE — NURSING NOTE
Due to patient being non-English speaking/uses sign language, an  was used for this visit. Only for face-to-face interpretation by an external agency, date and length of interpretation can be found on the scanned worksheet.     name: Sanjiv Bravo  Agency: [employer]  Language: Jose Manuel   Telephone number: .  Type of interpretation: Face-to-face, spoken

## 2023-09-26 LAB — RADIOLOGIST FLAGS: ABNORMAL

## 2023-09-28 ENCOUNTER — TRANSCRIBE ORDERS (OUTPATIENT)
Dept: MATERNAL FETAL MEDICINE | Facility: HOSPITAL | Age: 38
End: 2023-09-28
Payer: COMMERCIAL

## 2023-09-28 DIAGNOSIS — O28.3 ABNORMAL FETAL ULTRASOUND: Primary | ICD-10-CM

## 2023-09-28 DIAGNOSIS — O26.90 PREGNANCY RELATED CONDITION: Primary | ICD-10-CM

## 2023-09-28 NOTE — PROGRESS NOTES
Discussed with Dr Motta in clinic on 9/27/23 and recommend referral to MFM for repeat u/s and consult for abnl u/s findings.    Discussed with Sanjiv Bravo (BOGDAN) in clinic and he will let pt know that she has been referred to MFM due to cyst seen on umbilical cord.  Scheduled NOB for 10/9/23./Desiree Mccollum RN BSN

## 2023-10-09 ENCOUNTER — OFFICE VISIT (OUTPATIENT)
Dept: FAMILY MEDICINE | Facility: CLINIC | Age: 38
End: 2023-10-09
Payer: COMMERCIAL

## 2023-10-09 ENCOUNTER — ALLIED HEALTH/NURSE VISIT (OUTPATIENT)
Dept: FAMILY MEDICINE | Facility: CLINIC | Age: 38
End: 2023-10-09
Payer: COMMERCIAL

## 2023-10-09 VITALS
HEART RATE: 87 BPM | RESPIRATION RATE: 20 BRPM | SYSTOLIC BLOOD PRESSURE: 111 MMHG | OXYGEN SATURATION: 100 % | DIASTOLIC BLOOD PRESSURE: 74 MMHG | HEIGHT: 59 IN | BODY MASS INDEX: 28.02 KG/M2 | WEIGHT: 139 LBS | TEMPERATURE: 98.3 F

## 2023-10-09 DIAGNOSIS — Z72.0 TOBACCO ABUSE: ICD-10-CM

## 2023-10-09 DIAGNOSIS — O09.529 SUPERVISION OF HIGH-RISK PREGNANCY OF ELDERLY MULTIGRAVIDA: ICD-10-CM

## 2023-10-09 DIAGNOSIS — G43.909 MIGRAINE: ICD-10-CM

## 2023-10-09 DIAGNOSIS — O09.529 AMA (ADVANCED MATERNAL AGE) MULTIGRAVIDA 35+: ICD-10-CM

## 2023-10-09 DIAGNOSIS — Z11.59 NEED FOR HEPATITIS C SCREENING TEST: ICD-10-CM

## 2023-10-09 DIAGNOSIS — Z60.3 LANGUAGE BARRIER, CULTURAL DIFFERENCES: Primary | ICD-10-CM

## 2023-10-09 DIAGNOSIS — O09.90 SUPERVISION OF HIGH RISK PREGNANCY, ANTEPARTUM: Primary | ICD-10-CM

## 2023-10-09 DIAGNOSIS — O36.8990 UMBILICAL CORD CYST DURING PREGNANCY, ANTEPARTUM: ICD-10-CM

## 2023-10-09 DIAGNOSIS — O09.529 ANTEPARTUM MULTIGRAVIDA OF ADVANCED MATERNAL AGE: ICD-10-CM

## 2023-10-09 DIAGNOSIS — Z23 NEED FOR PROPHYLACTIC VACCINATION AND INOCULATION AGAINST INFLUENZA: ICD-10-CM

## 2023-10-09 DIAGNOSIS — O20.8 SUBCHORIONIC HEMORRHAGE IN FIRST TRIMESTER: ICD-10-CM

## 2023-10-09 DIAGNOSIS — K03.6 BETEL DEPOSIT ON TEETH: ICD-10-CM

## 2023-10-09 DIAGNOSIS — O28.3 ABNORMAL FETAL ULTRASOUND: ICD-10-CM

## 2023-10-09 LAB
ABO/RH(D): NORMAL
ANTIBODY SCREEN: NEGATIVE
ERYTHROCYTE [DISTWIDTH] IN BLOOD BY AUTOMATED COUNT: 12.4 % (ref 10–15)
HBA1C MFR BLD: 4.7 % (ref 0–5.6)
HCT VFR BLD AUTO: 36.3 % (ref 35–47)
HGB BLD-MCNC: 13 G/DL (ref 11.7–15.7)
HIV 1+2 AB+HIV1 P24 AG SERPL QL IA: NONREACTIVE
MCH RBC QN AUTO: 30.2 PG (ref 26.5–33)
MCHC RBC AUTO-ENTMCNC: 35.8 G/DL (ref 31.5–36.5)
MCV RBC AUTO: 84 FL (ref 78–100)
PLATELET # BLD AUTO: 261 10E3/UL (ref 150–450)
RBC # BLD AUTO: 4.31 10E6/UL (ref 3.8–5.2)
SPECIMEN EXPIRATION DATE: NORMAL
SPECIMEN EXPIRATION DATE: NORMAL
WBC # BLD AUTO: 9.6 10E3/UL (ref 4–11)

## 2023-10-09 PROCEDURE — 86780 TREPONEMA PALLIDUM: CPT

## 2023-10-09 PROCEDURE — 87086 URINE CULTURE/COLONY COUNT: CPT

## 2023-10-09 PROCEDURE — 83036 HEMOGLOBIN GLYCOSYLATED A1C: CPT

## 2023-10-09 PROCEDURE — 83655 ASSAY OF LEAD: CPT | Mod: 90

## 2023-10-09 PROCEDURE — 86901 BLOOD TYPING SEROLOGIC RH(D): CPT

## 2023-10-09 PROCEDURE — 36415 COLL VENOUS BLD VENIPUNCTURE: CPT

## 2023-10-09 PROCEDURE — 87491 CHLMYD TRACH DNA AMP PROBE: CPT

## 2023-10-09 PROCEDURE — 90686 IIV4 VACC NO PRSV 0.5 ML IM: CPT

## 2023-10-09 PROCEDURE — 86706 HEP B SURFACE ANTIBODY: CPT

## 2023-10-09 PROCEDURE — 90471 IMMUNIZATION ADMIN: CPT

## 2023-10-09 PROCEDURE — 86900 BLOOD TYPING SEROLOGIC ABO: CPT

## 2023-10-09 PROCEDURE — H1002 CARECOORDINATION PRENATAL: HCPCS

## 2023-10-09 PROCEDURE — 86803 HEPATITIS C AB TEST: CPT

## 2023-10-09 PROCEDURE — 86762 RUBELLA ANTIBODY: CPT

## 2023-10-09 PROCEDURE — 86850 RBC ANTIBODY SCREEN: CPT

## 2023-10-09 PROCEDURE — H1001 ANTEPARTUM MANAGEMENT: HCPCS

## 2023-10-09 PROCEDURE — 86787 VARICELLA-ZOSTER ANTIBODY: CPT

## 2023-10-09 PROCEDURE — 99000 SPECIMEN HANDLING OFFICE-LAB: CPT

## 2023-10-09 PROCEDURE — 87389 HIV-1 AG W/HIV-1&-2 AB AG IA: CPT

## 2023-10-09 PROCEDURE — 87591 N.GONORRHOEAE DNA AMP PROB: CPT

## 2023-10-09 PROCEDURE — 85027 COMPLETE CBC AUTOMATED: CPT

## 2023-10-09 PROCEDURE — 99207 PR FIRST OB VISIT: CPT | Mod: GC

## 2023-10-09 PROCEDURE — 87340 HEPATITIS B SURFACE AG IA: CPT

## 2023-10-09 RX ORDER — ACETAMINOPHEN 500 MG
500-1000 TABLET ORAL EVERY 6 HOURS PRN
Qty: 90 TABLET | Refills: 1 | Status: ON HOLD | OUTPATIENT
Start: 2023-10-09 | End: 2024-04-25

## 2023-10-09 SDOH — SOCIAL STABILITY - SOCIAL INSECURITY: ACCULTURATION DIFFICULTY: Z60.3

## 2023-10-09 NOTE — PROGRESS NOTES
First Obstetric Visit           Assessment and Plan       Sampson Fonseca is a 38 year old , at 10w5d weeks of pregnancy with DUNCAN of May 1, 2024 by  6 week ultrasound.  Patient's history is high risk for the following reasons: language barrier, AMA, umbilical cord cyst, subchorionic hemorrhage, tobacco and betel nut usage.     # Additional Pregnancy Risk Assessment: High risk pregnancy    - Reviewed early screening for gestational diabetes. The patient does have a history of GDM, physically in active with BMI>23 in [place] />25 in all other populations, physically active with BMI>30, h/o prediabetes/glucose intolerance, first degree relative with GDM or DM, or chronic HTN, so WILL obtain early GCT or A1c.    The patient does not have a history of:   Any one of the following high risk factors:  Pregestational DM1 or DM2  Chronic HTN  Autoimmune dz (Eg SLE, APLS)   H/o Preeclampsia in prior pregnancy  Multifetal gestation  6.   Renal Disease     Consider for two or more of the following moderate risk factors:  Nulliparity or > 10 years since last birth  Obesity (BMI > 30)  H/o preeclampsia in mother or sister  Age ? 35 years  Socio-demographic characteristics (low socioeconomic status, self-identified Black/ race)  Personal Hx of factors (prior SGA/low birthweight, prior adverse outcome: stillborn)  so WILL NOT start low dose aspirin (81mg) at 12-28 weeks (ideally 12-16 weeks) to prevent preeclampsia.    - The patient  does not have a history of spontaneous  birth so  WILL NOT consider progesterone starting at 16-20 weeks and/or serial transvaginal cervical length ultrasounds from 16-24 weeks.    # General prenatal care management:  - Dating US obtained and dating confirmed?   Yes 23  - Taking PNV/Folate?     Yes  - Prenatal vitamins ordered.  - Ordered new OB labs: CBC, blood type and antibody screen, HIV, VDRL, hep B sAg, rubella, UA/UC, gonorrhea/chlamydia, Hepatitis B immunity  and Varicella immunity done today.  - Flu shot offered and was Accepted.  - Discussed genetic screening. Patient is seeing MFM on 10/16 and will be meeting with genetic counselor then; discussed with patient that they will discuss more at that time. Patient is interested in genetic screening.    # Counseling given:   - Follow up in 4 weeks for return OB visit.  - Recommended weight gain for pregnancy: 15-25 lbs (pregravid BMI 25-29.9)  - Instructed on best evidence for: healthy diet and foods to avoid; exercise and activity during pregnancy; avoiding exposure to toxoplasmosis; and maintenance of a generally healthy lifestyle  - Patient to see OB educator/ RN today and/or next visit.  - Discussed the harms, benefits, side effects and alternative therapies for current prescribed and OTC medications.    Sampson was seen today for prenatal care and imm/inj.    Diagnoses and all orders for this visit:    Supervision of high risk pregnancy, antepartum  Getting prenatal labs today; getting varicella and hep B antibodies as unclear if patient is currently immune.  Adding calcium supplement as patient does not consume dairy and Tylenol as needed for headaches; patient has refills of prenatal vitamin available and is taking it.  Declines COVID-vaccine.  Deferring Pap smear to postpartum.  No more vaginal bleeding after 9/4.  -     ABO/Rh Type-HML; Future  -     Antibody Screen; Future  -     CBC with Plt; Future  -     Culture Urine; Future  -     Hepatitis B Surface Ag; Future  -     HIV Ag/Ab Screen Cascade; Future  -     Lead, Blood; Future  -     Rubella Antibody IgG; Future  -     Syphilis Screen Cascade; Future  -     Chlamydia trachomatis PCR  URINE; Future  -     Neisseria gonorrhoeae PCR  URINE; Future  -     Hepatitis B Surface Ab; Future  -     Abdelrahman Zoster Imm Status Aleena; Future  -     Hemoglobin A1c; Future  -     Hepatitis C Antibody; Future  -     calcium carbonate (OS-KODY) 1500 (600 Ca) MG tablet; Take 1 tablet (600  mg) by mouth 2 times daily  -     acetaminophen (TYLENOL) 500 MG tablet; Take 1-2 tablets (500-1,000 mg) by mouth every 6 hours as needed for mild pain    Abnormal fetal ultrasound  Following up with MFM next week for potential umbilical cord cyst and history of subchorionic hemorrhage.  Will discuss genetic screening at that time.    Need for hepatitis C screening test  -     Hepatitis C Antibody; Future    Need for prophylactic vaccination and inoculation against influenza  -     INFLUENZA VACCINE IM > 6 MONTHS VALENT IIV4 (AFLURIA/FLUZONE)        Options for treatment and follow-up care were reviewed with the patient and/or guardian. Sampson Fonseca and/or guardian engaged in the decision making process and verbalized understanding of the options discussed and agreed with the final plan.    Patient precepted with Dr. Jama.    Ana M Pace MD         HPI       Sampson Fonseca is a 38 year old woman who presents for an initial prenatal visit at 10w5d weeks of pregnancy with DUNCAN of May 1, 2024 by LMP of Patient's last menstrual period was 2023.    She has had bleeding since her LMP.  Was seen in clinic for this on  for ER follow-up with 2 days of vaginal spotting around 6 weeks gestation.  Ultrasound done on  showed right-sided subchorionic hemorrhage.  Follow-up ultrasound done on  shows potential umbilical cord cyst. No vaginal bleeding since .  She has not had nausea.   Weight loss has not occurred.    This was a planned pregnancy.  Only medication she is taking is prenatal vitamin.     OTHER CONCERNS:  Does not eat dairy, personal preference  Has had occasional swelling in feet and muscle stiffness that has been occurring for years.  Currently chewing tobacco and betel nut daily - patient states she is trying to quit currently on her own and will eventually stop on her own.  3rd child in [place] with jaundice requiring phototherapy, umbilical cord infection after birth            Labor Risk  "Assessment     Is the patient's age <18 or >40?     No  Patint's BMI is Body mass index is 27.98 kg/m .   Does patient have a BMI < 18.5?     No  Prior delivery within 6 months?      No  Ever delivered prior to 37 weeks gestation?  No  Pregnancy occur via In Vitro Fertilization?   No  Are you carrying twins?       No    The patient has the following additional risk factors for  labor:   none    as documented     Labor Risk Summary:  Pt is high risk for  Labor  No               Past Medical History     Past Medical History:   Diagnosis Date    NO ACTIVE PROBLEMS      See nurse history note, reviewed in detail.             Current Medications      Current Outpatient Medications   Medication Sig Dispense Refill    acetaminophen (TYLENOL) 500 MG tablet Take 1-2 tablets (500-1,000 mg) by mouth every 6 hours as needed for mild pain 90 tablet 1    calcium carbonate (OS-KODY) 1500 (600 Ca) MG tablet Take 1 tablet (600 mg) by mouth 2 times daily 180 tablet 4    Prenatal Vit-Fe Fumarate-FA (PRENATAL MULTIVITAMIN W/IRON) 27-0.8 MG tablet Take 1 tablet by mouth daily 90 tablet 3             Review of Systems      ROS:  YES - Headache - has chronic migraines; no change in these (1-2 times/week)  No - Changes in vision  No - Chest Pain  No - Shortness of Breath  No - Nausea   No - Vomiting  No - Abdominal pain   No - Contractions  No - Dysuria   No - Vaginal Discharge    No - Vaginal bleeding - no bleeding since   No - Loss of Fluid   YES - Extremity swelling - chronic leg swelling when standing for long periods of time; no change in this.    ====================================================           Physical Exam      /74   Pulse 87   Temp 98.3  F (36.8  C) (Oral)   Resp 20   Ht 1.501 m (4' 11.1\")   Wt 63 kg (139 lb)   LMP 2023   SpO2 100%   BMI 27.98 kg/m    GENERAL: healthy, alert and no distress  NECK: no tenderness, no adenopathy, no asymmetry, no masses, no stiffness; thyroid- " normal to palpation  RESP: lungs clear to auscultation - no rales, no rhonchi, no wheezes  CV: regular rates and rhythm, normal S1 S2, no S3 or S4 and no murmur, no click or rub -  ABDOMEN: soft, no tenderness, no  hepatosplenomegaly, no masses, normal bowel sounds  MS: extremities- no gross deformities noted, no edema   exam deferred as patient declines pap smear.    Past labs reviewed:  B POS  Varicella immune  equivocal in 2017  Hepatitis B immune No in 2017; received 2 vaccines  Last pap 3/2017.  She is due for this today. Deferring to postpartum.    =========================================

## 2023-10-09 NOTE — LETTER
"October 12, 2023      Sampson Fonseca  [address] [place] [address]        Dear ,    We are writing to inform you of your test results.    Your lab results are all normal. Please call our clinic if you have any questions.     Resulted Orders   ABO/Rh Type-HML   Result Value Ref Range    ABO/RH(D) B POS     SPECIMEN EXPIRATION DATE 20231012235900    Antibody Screen   Result Value Ref Range    Antibody Screen Negative Negative    SPECIMEN EXPIRATION DATE [id number]    CBC with Plt   Result Value Ref Range    WBC Count 9.6 4.0 - 11.0 10e3/uL    RBC Count 4.31 3.80 - 5.20 10e6/uL    Hemoglobin 13.0 11.7 - 15.7 g/dL    Hematocrit 36.3 35.0 - 47.0 %    MCV 84 78 - 100 fL    MCH 30.2 26.5 - 33.0 pg    MCHC 35.8 31.5 - 36.5 g/dL    RDW 12.4 10.0 - 15.0 %    Platelet Count 261 150 - 450 10e3/uL   Culture Urine   Result Value Ref Range    Culture <10,000 CFU/mL Mixture of urogenital filiberto    Hepatitis B Surface Ag   Result Value Ref Range    Hepatitis B Surface Antigen Nonreactive Nonreactive   HIV Ag/Ab Screen Cascade   Result Value Ref Range    HIV Antigen Antibody Combo Nonreactive Nonreactive      Comment:      HIV-1 p24 Ag & HIV-1/HIV-2 Ab Not Detected   Lead, Blood   Result Value Ref Range    Lead Venous Blood <2.0 <=4.9 ug/dL      Comment:      INTERPRETIVE INFORMATION: Lead, Blood (Venous)    Analysis performed by Inductively Coupled Plasma-Mass   Spectrometry (ICP-MS).    Elevated results may be due to skin or collection-related   contamination, including the use of a noncertified   lead-free tube. If contamination concerns exist due to   elevated levels of blood lead, confirmation with a second   specimen collected in a certified lead-free tube is   recommended.    Information sources for blood lead reference intervals and   interpretive comments include the CDC's \"Childhood Lead   Poisoning Prevention: Recommended Actions Based on Blood   Lead Level\" and the \"Adult Blood Lead Epidemiology and " "  Surveillance: Reference Blood Lead Levels (BLLs) for Adults   in the [place].\" Thresholds and time intervals for retesting,   medical evaluation, and response vary by state and   regulatory body. Contact your State Department of Health   and/or applicable regulatory agency for specific guidance   on medical management  recommendations.    This test was developed and its performance characteristics   determined by [facility]. It has not been cleared or   approved by the U.S. Food and Drug Administration. This   test was performed in a CLIA-certified laboratory and is   intended for clinical purposes.         Group          Concentration   Comment    Children       3.5-19.9 ug/dL  Children under the age of 6                                 years are the most vulnerable                                 to the harmful effects of                                  lead exposure. Environmental                                  investigation and exposure                                  history to identify potential                                 sources of lead. Biological                                  and nutritional monitoring                                 are recommended. Follow-up                                  blood lead monitoring is                                  recommended.                                 20-44.9 ug/dL   Lead hazard reduction and                                  prompt medical evaluation are                                 recommended. Contact a                                  Pediatric Environmental                                  Health Specialty Unit or                                  poison control center for                                  guidance.                   Greater than    Critical. Immediate medical                  44.9 ug/dL      evaluation, including                                  detailed neurological exam is                                 recommended. " Consider                                  chelation therapy when                                 symptoms of lead toxicity   are                                  present. Contact a Pediatric                                  Environmental Health                                  Specialty Unit or poison                                  control center for                                   assistance.    Adult          5-19.9 ug/dL    Medical removal is                                  recommended for pregnant                                  women or those who are trying                                 or may become pregnant.                                  Adverse health effects are                                  possible. Reduced lead                                  exposure and increased blood                                  lead monitoring are                                  recommended.                    20-69.9 ug/dL   Adverse health effects are                                  indicated. Medical removal                                  from lead exposure is                                  required by OSHA if blood                                  lead level exceeds 50 ug/dL.                                 Prompt medical evaluation is                                 recommended.                    Greater than    Critical. Immediate medical                   69.9 ug/dL      evaluation is recommended.                                  Consider chelation therapy                                 when symptoms of lead                                  toxicity are present.  Performed By: [facility]  [address] [place] [address]  : Ciro Smith MD, PhD  CLIA Number: [id number]   Rubella Antibody IgG   Result Value Ref Range    Rubella Aleena IgG Instrument Value 14.10 <0.90 Index    Rubella Antibody IgG Positive       Comment:      Suggests previous exposure or  immunization and probable immunity.   Syphilis Screen Cascade   Result Value Ref Range    Treponema Antibody Total Nonreactive Nonreactive   Chlamydia trachomatis PCR  URINE   Result Value Ref Range    Chlamydia trachomatis Negative Negative      Comment:      A negative result by transcription mediated amplification does not preclude the presence of C. trachomatis infection because results are dependent on proper and adequate collection, absence of inhibitors and sufficient rRNA to be detected.   Neisseria gonorrhoeae PCR  URINE   Result Value Ref Range    Neisseria gonorrhoeae Negative Negative      Comment:      Negative for N. gonorrhoeae rRNA by transcription mediated amplification. A negative result by transcription mediated amplification does not preclude the presence of C. trachomatis infection because results are dependent on proper and adequate collection, absence of inhibitors and sufficient rRNA to be detected.   Hepatitis B Surface Ab   Result Value Ref Range    Hepatitis B Surface Antibody Instrument Value >1,000.00 <8.00 m[IU]/mL    Hepatitis B Surface Antibody Reactive       Comment:      Patient is considered to be immune to infection with hepatitis B when the value is greater than or equal to 12.00 mIU/mL.   Abdelrahman Zoster Imm Status Aleena   Result Value Ref Range    VZV Aleena IgG Instrument Value 548.8 <135.0 Index    Varicella Zoster Antibody IgG Positive       Comment:      Suggests previous exposure or immunization and probable immunity.   Hemoglobin A1c   Result Value Ref Range    Hemoglobin A1C 4.7 0.0 - 5.6 %      Comment:      Normal <5.7%   Prediabetes 5.7-6.4%    Diabetes 6.5% or higher     Note: Adopted from ADA consensus guidelines.   Hepatitis C Antibody   Result Value Ref Range    Hepatitis C Antibody Nonreactive Nonreactive    Narrative    Assay performance characteristics have not been established for newborns, infants, and children.       If you have any questions or concerns, please call  the clinic at the number listed above.       Sincerely,      Guzman Pace MD

## 2023-10-09 NOTE — PROGRESS NOTES
Past Medical History     Do you have a history of any of the following medical conditions?    Condition No/Yes/Details   Hypertension No    Heart disease, mitral valve prolapse, rheumatic fever No    Asthma or another chronic lung disease No    An autoimmune disorder No    Kidney disease No    Frequent urinary tract infections No    Epilepsy, seizures, or spells No    Migraine headaches  YES migraines 1-2 x's per week   Stroke, loss of sensation/function, seizures, or other neuro problem No    Diabetes No    Thyroid problems or have you taken thyroid medication No    Hepatitis, liver disease, jaundice No    Blood clots, phlebitis, pulmonary embolism or varicose veins No    Excessive bleeding after surgery or dental work No    Do you have more bleeding than other women after a cut or a scratch? No    Anemia No    Blood transfusions No         Would you refuse a blood transfusion?       No   If yes, then ask next question. If no, skip next question.   Would you rather die than receive a blood transfusion? No    Breast problems No    Have you ever ?  YES plans to breastfeed   Abnormalities of the uterus No    Abnormal pap smear No    Have you ever been treated for depression? No    Are you having problems with crying spells or loss of self-esteem? No    Have you ever required psychiatric care? No    Have you been physically, sexually, or emotionally hurt by someone? No    Have you been in a major accident or suffered serious trauma? No    Have you ever had any gynecological surgical procedures such as cervical conization, LEEP, laser treatment, cryosurgery of the cervix, or a dilatation and curettage? No    Have you had any other surgical procedures? No         Have you ever had any complications from anesthesia? No    Have you ever been hospitalized for a nonsurgical reason? No             Substance use and exposure     Does anyone in your home smoke? No    Do you use tobacco products or betel nut?   YES chews 4-6 x's tobacco and betel nut   Do you drink beer, wine, or hard liquor? No    Do you use any of the following: marijuana, speed, cocaine, heroin, hallucinogens, or other drugs? No               Symptoms since Last Menstrual Period     Do you currently have any of the following symptoms: No/Yes/Details        Abdominal pain No         Blood in the stools or urine No         Chest pain No         Shortness of breath No         coughing or vomiting up blood No         Your heart racing or skipping beats No         Nausea or vomiting No         Pain on urination No         Vaginal discharge or bleeding No                Genetic Screening          Is the patient 35 years or older? Yes: referred to MFM and appt 10/16/23     Do you have a history of any of the following No/Yes/Details        A metabolic disorder (e.g. Insulin-dependent DM, PKU) No         Recurrent pregnancy loss or still birth No      Do you, the baby's father, or anyone in your families have          Thalassemia AND MCV <80 No         Hemophilia No         Neural tube defect No }        Congenital heart defect No         Sickle cell disease or trait No         Muscular dystrophy No         Cystic fibrosis No         Mental retardation or autism No         Down's syndrome No         Ladarius-Sach's disease No         Aleksandar's chorea No         Any other inherited genetic or chromosomal disorder No         A child with birth defects not listed above No                Infection History     Ever treated for tuberculosis or had a positive skin test No    Ever had genital herpes (or has your partner) No    Had a rash or viral illness since LMP No    Ever had a sexually transmitted infection No    Ever had chicken pox or the vaccine  YES as a child   Have you had a sexual partner who is HIV positive No    Ever had any other serious infectious disease No                Risk Assessment     Average Risk Category  No significant risk factors:  Yes    At Risk Category (up to 3)  Teen pregnancy: No  Poor social situation: No  Domestic abuse: No  Financial difficulties: No  Smoker: No  H/O  deliver: No  H/O drug abuse: No  Non-English speaking: Yes  Advanced maternal age: Yes  GDM risks: Yes  Previous C/S: No  H/O PIH: No  H/O STIs: No  H/O mental health concerns: No  Onset care > 20 weeks: No      High Risk Category (4 or more At Risk or)  Diabetes/GDM: No  Multiple gestation: No  Chronic hypertension: No  Significant hx of asthma: No  Fetal demise > 20 weeks: No  Positive tox screen: No  Current mental health treatment: No      Risk: At Risk   Date determined: 10/9/23

## 2023-10-09 NOTE — PATIENT INSTRUCTIONS
Nice to see you today!    Here's what we talked about:  - You can  your medications from the pharmacy.  - I will send you a letter with your results if they are normal, and will call you if they are not normal.

## 2023-10-09 NOTE — PROGRESS NOTES
Preceptor Attestation:    I discussed the patient with the resident and evaluated the patient in person. I have verified the content of the note, which accurately reflects my assessment of the patient and the plan of care.   Supervising Physician:  Alvaro Jama MD.

## 2023-10-10 PROBLEM — O20.8 SUBCHORIONIC HEMORRHAGE IN FIRST TRIMESTER: Status: ACTIVE | Noted: 2023-09-12

## 2023-10-10 PROBLEM — Z23 NEED FOR VARICELLA VACCINE: Status: RESOLVED | Noted: 2017-03-20 | Resolved: 2023-10-10

## 2023-10-10 PROBLEM — O09.529 SUPERVISION OF HIGH-RISK PREGNANCY OF ELDERLY MULTIGRAVIDA: Status: ACTIVE | Noted: 2023-10-09

## 2023-10-10 PROBLEM — G47.00 INSOMNIA, UNSPECIFIED TYPE: Status: RESOLVED | Noted: 2019-08-27 | Resolved: 2023-10-10

## 2023-10-10 PROBLEM — O36.8990 UMBILICAL CORD CYST DURING PREGNANCY, ANTEPARTUM: Status: ACTIVE | Noted: 2023-09-25

## 2023-10-10 PROBLEM — Z60.3 LANGUAGE BARRIER, CULTURAL DIFFERENCES: Status: ACTIVE | Noted: 2023-10-09

## 2023-10-10 LAB
C TRACH DNA SPEC QL NAA+PROBE: NEGATIVE
HBV SURFACE AB SERPL IA-ACNC: >1000 M[IU]/ML
HBV SURFACE AB SERPL IA-ACNC: REACTIVE M[IU]/ML
HBV SURFACE AG SERPL QL IA: NONREACTIVE
HCV AB SERPL QL IA: NONREACTIVE
LEAD BLDV-MCNC: <2 UG/DL
N GONORRHOEA DNA SPEC QL NAA+PROBE: NEGATIVE
RUBV IGG SERPL QL IA: 14.1 INDEX
RUBV IGG SERPL QL IA: POSITIVE
T PALLIDUM AB SER QL: NONREACTIVE
VZV IGG SER QL IA: 548.8 INDEX
VZV IGG SER QL IA: POSITIVE

## 2023-10-10 NOTE — NURSING NOTE
Family Medicine OB Education    I provided the following OB education to Sampson MAIN Fonseca.    Discussed that Dr Pace should be the doctor that she sees for her prenatal visits and that provider will try hard to be the one to deliver her baby.  Discussed that if primary provider was unavailable that one of our other physicians would deliver the baby and that this could be a male or female provider.  Briefly discussed residency program and that multiple doctors would be present at time of delivery.  Sheet given and discussed fetal growth and development.  Sheet given and discussed warning signs with reasons to call clinic or L&D with questions or concerns (phone numbers given).  Sheet given and discussed Tdap to be given after 27 weeks gestation and the importance of family members get immunized before delivery.  Proof of pregnancy completed and given to pt.  Gave pt preregistration form for hospital to complete.  See questionnaire and pregnancy risk assessment for further information in provider encounter.       Name of provider who requested the OB education: Dr Pace  Name of provider on site (faculty or community preceptor) at the time of performing the OB education: Dr Wiley Mccollum, RN, BSN

## 2023-10-11 ENCOUNTER — PRE VISIT (OUTPATIENT)
Dept: MATERNAL FETAL MEDICINE | Facility: HOSPITAL | Age: 38
End: 2023-10-11
Payer: COMMERCIAL

## 2023-10-11 ENCOUNTER — HOSPITAL ENCOUNTER (OUTPATIENT)
Facility: CLINIC | Age: 38
End: 2023-10-11
Payer: COMMERCIAL

## 2023-10-11 LAB — BACTERIA UR CULT: NORMAL

## 2023-10-16 ENCOUNTER — LAB (OUTPATIENT)
Dept: LAB | Facility: HOSPITAL | Age: 38
End: 2023-10-16
Attending: STUDENT IN AN ORGANIZED HEALTH CARE EDUCATION/TRAINING PROGRAM
Payer: COMMERCIAL

## 2023-10-16 ENCOUNTER — OFFICE VISIT (OUTPATIENT)
Dept: MATERNAL FETAL MEDICINE | Facility: HOSPITAL | Age: 38
End: 2023-10-16
Attending: STUDENT IN AN ORGANIZED HEALTH CARE EDUCATION/TRAINING PROGRAM
Payer: COMMERCIAL

## 2023-10-16 ENCOUNTER — MEDICAL CORRESPONDENCE (OUTPATIENT)
Dept: HEALTH INFORMATION MANAGEMENT | Facility: CLINIC | Age: 38
End: 2023-10-16

## 2023-10-16 ENCOUNTER — ANCILLARY PROCEDURE (OUTPATIENT)
Dept: ULTRASOUND IMAGING | Facility: HOSPITAL | Age: 38
End: 2023-10-16
Attending: STUDENT IN AN ORGANIZED HEALTH CARE EDUCATION/TRAINING PROGRAM
Payer: COMMERCIAL

## 2023-10-16 DIAGNOSIS — O26.90 PREGNANCY RELATED CONDITION: ICD-10-CM

## 2023-10-16 DIAGNOSIS — O20.9 VAGINAL BLEEDING IN PREGNANCY, FIRST TRIMESTER: ICD-10-CM

## 2023-10-16 DIAGNOSIS — O09.521 SUPERVISION OF ELDERLY MULTIGRAVIDA, FIRST TRIMESTER: ICD-10-CM

## 2023-10-16 DIAGNOSIS — O09.521 SUPERVISION OF ELDERLY MULTIGRAVIDA, FIRST TRIMESTER: Primary | ICD-10-CM

## 2023-10-16 DIAGNOSIS — O09.521 MULTIGRAVIDA OF ADVANCED MATERNAL AGE IN FIRST TRIMESTER: ICD-10-CM

## 2023-10-16 PROCEDURE — 76813 OB US NUCHAL MEAS 1 GEST: CPT

## 2023-10-16 PROCEDURE — 96040 HC GENETIC COUNSELING, EACH 30 MINUTES: CPT | Performed by: GENETIC COUNSELOR, MS

## 2023-10-16 PROCEDURE — 99207 PR NO CHARGE LOS: CPT | Performed by: OBSTETRICS & GYNECOLOGY

## 2023-10-16 PROCEDURE — 36415 COLL VENOUS BLD VENIPUNCTURE: CPT

## 2023-10-16 PROCEDURE — 76813 OB US NUCHAL MEAS 1 GEST: CPT | Mod: 26 | Performed by: OBSTETRICS & GYNECOLOGY

## 2023-10-16 NOTE — NURSING NOTE
Sampson Fonseca is a  at 11w5d who presents to [facility] for scheduled early nuchal translucency ultrasound and genetic counseling. SBAR given to Dr. Smith, see note in Epic.

## 2023-10-16 NOTE — PROGRESS NOTES
Please see the imaging tab for details of the ultrasound performed today.    Pili Smith MD  Specialist in Maternal-Fetal Medicine

## 2023-10-16 NOTE — PROGRESS NOTES
[facility]  Genetic Counseling Consult    Patient:  Sampson Fonseca YOB: 1985   Date of Service:  10/16/23   MRN: [id number]    Sampson was seen at the [facility] for genetic consultation. The indication for genetic counseling is advanced maternal age. The patient was accompanied to this visit by her sister (Keren Bravo, [phone]) and her partner's brother (Lauren Arriaza [phone]). The patient declined [place]  and requested that her sister and brother-in-law interpret for her.    IMPRESSION/ PLAN       During today's [facility] visit, Sampson had a blood draw for expanded non-invasive prenatal testing (also called NIPT, NIPS, or cell-free DNA) through [facility]. The expanded NIPT screens for trisomy 21, 18, and 13 and 22q11.2 deletion syndrome. The patient opted to screen for sex chromosome aneuploidies, including reported fetal sex.  In accordance with current guidelines, other microdeletion syndromes and rare autosomal trisomies were not included, but reflex to include these conditions remains available with expanded NIPT if there is an indication later in the pregnancy. Results are expected in 7-14 days. The patient will be called with results and if they do not answer they requested a detailed message with results on their voicemail, NOT including the predicted fetal sex information. Instead, they would like the sex information reported to her sister and brother-in-law. A consent to communicate was signed so that both Keren and aLuren could be call with fetal sex information or abnormal NIPT results.    We also reviewed the results of her 8 week scan which was concerning for a possible umbilical cord cyst. We reviewed that this finding may be associated with an increased risk for aneuploidy.     Since the patient chose aneuploidy screening via NIPT, quad screen is NOT recommended in the second trimester. If the patient desires  "screening for open neural tube defects, maternal serum AFP only is recommended, ideally between 16-18 weeks gestation.    Sampson had a nuchal translucency ultrasound today. Please see the ultrasound report for further details.    Further recommendations include a fetal anatomy level II ultrasound with MFM.    PREGNANCY HISTORY   /Parity:       Sampson's pregnancy history is significant for:   2005: term, , male  2009: term, , male  2010: term, , female  2017: term, , female    CURRENT PREGNANCY   Current Age: 38 year old   Age at Delivery: 38 year old  DUNCAN: 2024, by Ultrasound                                   Gestational Age: 11w5d  This pregnancy is a single gestation.       FAMILY HISTORY   A three-generation pedigree was obtained today and is scanned under the \"Media\" tab in Epic. The family history was reported by Sampson, her sister, and her brother-in-law.    The reported family history is unremarkable for multiple miscarriages, stillbirths, birth defects, intellectual disabilities, known genetic conditions, and consanguinity.       RISK ASSESSMENT FOR INHERITED CONDITIONS AND CARRIER SCREENING OPTIONS   Expanded carrier screening is available to screen for autosomal recessive conditions and X-linked conditions in a large list of genes. Carrier screening does not test the pregnancy but gives a risk assessment for the pregnancy and future pregnancies to have the condition. Expanded carrier screening is designed to identify carrier status for conditions that are primarily childhood or adolescent onset. Expanded carrier screening does not evaluate for adult-onset conditions such as hereditary cancer syndromes, dementia/ Alzheimer's disease, or cardiovascular disease risk factors. Additionally, expanded carrier screening is not comprehensive for all known genetic diseases or inherited conditions. Carrier screening does not test for all genetic and health conditions or risk factors. "     Autosomal recessive conditions happen when a mutation has been inherited from the egg and sperm and include conditions like cystic fibrosis, thalassemia, hearing loss, spinal muscular atrophy, and more. We reviewed that when both biological parents carry a harmful genetic change in a gene associated with autosomal recessive inheritance, each of their pregnancies has a 1 in 4 (25%) chance to be affected by that condition. X-linked conditions happen when a mutation has been inherited from the egg and include conditions like fragile X syndrome.With x-linked conditions, the specific risk generally depends on the chromosomal sex of the fetus, with XY individuals (generally male) being most severely affected.     [place] screening was reviewed. About [place] Screening    The patient declined the carrier screening options. They are aware the option will remain, and they can contact us if they would like to pursue screening. See below for the more detailed information we dicussed.      RISK ASSESSMENT FOR CHROMOSOME CONDITIONS   We explained that the risk for fetal chromosome abnormalities increases with maternal age. We discussed specific features of common chromosome abnormalities, including Down syndrome, trisomy 13, trisomy 18, and sex chromosome trisomies.    At age 38 at midtrimester, the risk to have a baby with Down syndrome is 1 in 129.   At age 38 at midtrimester, the risk to have a baby with any chromosome abnormality is 1 in 65.     Sampson has not had genetic screening in this pregnancy but elected to have screening today.      GENETIC TESTING OPTIONS   Genetic testing during a pregnancy includes screening and diagnostic procedures.      Screening tests are non-invasive which means no risk to the pregnancy and includes ultrasounds and blood work. The benefits and limitations of screening were reviewed. Screening tests provide a risk assessment (chance) specific to the pregnancy for certain fetal chromosome  abnormalities but cannot definitively diagnose or exclude a fetal chromosome abnormality. Follow-up genetic counseling and consideration of diagnostic testing is recommended with any abnormal screening result. Diagnostic testing during a pregnancy is more certain and can test for more conditions. However, the tests do have a risk of miscarriage that requires careful consideration. These tests can detect fetal chromosome abnormalities with greater than 99% certainty. Results can be compromised by maternal cell contamination or mosaicism and are limited by the resolution of current genetic testing technology.     There is no screening or diagnostic test that detects all forms of birth defects or intellectual disability.     We discussed the following screening options:     Non-invasive prenatal testing (NIPT)  Also called cell-free DNA screening because it detects chromosomes from the placenta in the pregnant person's blood  Can be done any time after 10 weeks gestation  Standard recommendation for NIPT screens for trisomy 21, trisomy 18, trisomy 13, with the option of adding sex chromosome aneuploidies, without or without predicted sex  Cannot screen for open neural tube defects, maternal serum AFP after 15 weeks is recommended  Current (2023) ACMG guidelines do recommend that screening for one microdeletion syndrome, called 22q11.2 deletion syndrome be offered to all pregnant patients. 22q11.2 deletion syndrome has an estimated prevalence of 1 in 990 to 1 in 2148 (0.05-0.1%). Risk is not thought to increase with maternal age. Clinical features are variable but include congenital heart defects, cleft palate, developmental delays, immune system deficiencies, and hearing loss. Approximately 90% of cases are de orquidea (a sporadic new change in a pregnancy). Cell-free DNA screening for 22q11.2 deletion syndrome is available (Expanded NIPS through [facility]). We discussed the limitations of cell-free DNA screening  in detecting microdeletions and the possiblity of false positives and false negatives. Expanded NIPS also allows for reflex to include other microdeletion conditions and rare autosomal trisomies if an indication would arise later in the pregnancy. The patient opted into 22q11.2 deletion syndrome screening as part of the expanded NIPT option.       We discussed the following ultrasound options:  Nuchal translucency (NT) ultrasound  Ultrasound between [id number] that includes nuchal translucency measurement and nasal bone assessments  Nuchal translucency refers to the space at the back of the neck where fluid builds up. All babies at this stage have fluid and there is only concern if there is too much fluid  This ultrasound can be done as part of first trimester screening, at the same time as another screen (NIPT), at the same time as a CVS, or if the patients does not want genetic screening.  Markers on ultrasound detects about 70% of pregnancies with aneuploidy  Abnormalities on NT ultrasound can also increase the risk for a birth defect, like a heart defect    We discussed the following diagnostic options:     Chorionic villus sampling (CVS)  Invasive diagnostic procedure done between 10w0d and 13w6d  The procedure collects a small sample from the placenta for the purpose of chromosomal testing and/or other genetic testing  Diagnostic result; more than 99% sensitivity for fetal chromosome abnormalities  Cannot screen for open neural tube defects, maternal serum AFP after 15 weeks is recommended    Amniocentesis  Invasive diagnostic procedure done after 15 weeks gestation  The procedure collects a small sample of amniotic fluid for the purpose of chromosomal testing and/or other genetic testing  Diagnostic result; more than 99% sensitivity for fetal chromosome abnormalities  Testing for AFP in the amniotic fluid can test for open neural tube defects    It was a pleasure to be involved with Sampson s care. Face-to-face  time of the meeting was 60 minutes.    Alivia Singh GC, MS, [facility]  Board Certified and [place] Licensed Genetic Counselor   [facility]  Maternal Fetal Medicine  Office: [phone]  [facility]: [phone]   Fax: [phone]  [facility]

## 2023-10-20 ENCOUNTER — TELEPHONE (OUTPATIENT)
Dept: MATERNAL FETAL MEDICINE | Facility: HOSPITAL | Age: 38
End: 2023-10-20
Payer: COMMERCIAL

## 2023-10-20 LAB — SCANNED LAB RESULT: NORMAL

## 2023-10-20 NOTE — TELEPHONE ENCOUNTER
Called Sampson with a Jose Manuel  (ID: [id number]) and left a message for Sampson regarding her low risk NIPT results. Results indicate a low risk for fetal aneuploidy involving chromosomes 21, 18, 13, sex chromosomes, and 22q deletion syndrome. Fetal sex was not disclosed per patient wishes. This puts her current pregnancy at low risk for Down syndrome, trisomy 18, trisomy 13, sex chromosome abnormalities and 22q deletion syndrome. Although these results are reassuring, this does not replace a standard chromosome analysis from a chorionic villus sampling or amniocentesis.     At her GC visit, the patient requested that I call her sister and brother-in-law with predicted fetal sex information. This information was also shared with them.    Plan: Level II ultrasound is scheduled on 12/4/2023. MSAFP is the appropriate second trimester screening test for open neural tube defects; the maternal quad screen is not recommended. Her results are available in her Epic chart for her primary OB to review.    Alivia Singh MS, [facility]  Maternal Fetal Medicine  [phone]

## 2023-10-28 PROBLEM — O20.8 SUBCHORIONIC HEMORRHAGE IN FIRST TRIMESTER: Status: RESOLVED | Noted: 2023-09-12 | Resolved: 2023-10-28

## 2023-10-28 PROBLEM — O36.8990 UMBILICAL CORD CYST DURING PREGNANCY, ANTEPARTUM: Status: RESOLVED | Noted: 2023-09-25 | Resolved: 2023-10-28

## 2023-11-30 ENCOUNTER — OFFICE VISIT (OUTPATIENT)
Dept: FAMILY MEDICINE | Facility: CLINIC | Age: 38
End: 2023-11-30
Payer: COMMERCIAL

## 2023-11-30 VITALS
RESPIRATION RATE: 20 BRPM | BODY MASS INDEX: 29.48 KG/M2 | DIASTOLIC BLOOD PRESSURE: 76 MMHG | SYSTOLIC BLOOD PRESSURE: 111 MMHG | HEART RATE: 101 BPM | OXYGEN SATURATION: 100 % | WEIGHT: 146.2 LBS | HEIGHT: 59 IN | TEMPERATURE: 98 F

## 2023-11-30 DIAGNOSIS — O35.BXX0 ANOMALY OF HEART OF FETUS AFFECTING PREGNANCY, ANTEPARTUM, SINGLE OR UNSPECIFIED FETUS: ICD-10-CM

## 2023-11-30 DIAGNOSIS — Z72.0 TOBACCO ABUSE: ICD-10-CM

## 2023-11-30 DIAGNOSIS — O09.529 SUPERVISION OF HIGH-RISK PREGNANCY OF ELDERLY MULTIGRAVIDA: Primary | ICD-10-CM

## 2023-11-30 PROCEDURE — 99207 PR PRENATAL VISIT: CPT | Mod: GC

## 2023-11-30 ASSESSMENT — ANXIETY QUESTIONNAIRES
1. FEELING NERVOUS, ANXIOUS, OR ON EDGE: NOT AT ALL
5. BEING SO RESTLESS THAT IT IS HARD TO SIT STILL: NOT AT ALL
GAD7 TOTAL SCORE: 0
GAD7 TOTAL SCORE: 0
IF YOU CHECKED OFF ANY PROBLEMS ON THIS QUESTIONNAIRE, HOW DIFFICULT HAVE THESE PROBLEMS MADE IT FOR YOU TO DO YOUR WORK, TAKE CARE OF THINGS AT HOME, OR GET ALONG WITH OTHER PEOPLE: NOT DIFFICULT AT ALL
6. BECOMING EASILY ANNOYED OR IRRITABLE: NOT AT ALL
7. FEELING AFRAID AS IF SOMETHING AWFUL MIGHT HAPPEN: NOT AT ALL
2. NOT BEING ABLE TO STOP OR CONTROL WORRYING: NOT AT ALL
3. WORRYING TOO MUCH ABOUT DIFFERENT THINGS: NOT AT ALL

## 2023-11-30 ASSESSMENT — PATIENT HEALTH QUESTIONNAIRE - PHQ9
5. POOR APPETITE OR OVEREATING: NOT AT ALL
SUM OF ALL RESPONSES TO PHQ QUESTIONS 1-9: 0

## 2023-11-30 NOTE — PATIENT INSTRUCTIONS
"Nice to see you today!    Here's what we talked about:    Nicotine gum:  How to take it: When the urge to smoke occurs, chew gum until you feel a tingle, then \"park\" the gum in your cheek until the tingle is gone. Re-chew every few minutes and \"park\" again, chewing one piece for 30 minutes. Do not eat or drink while chewing.  Follow this schedule:  Weeks 1 to 6: One piece of gum every 1 to 2 hours. Use at least 9 pieces a day, but no more than 24.  Weeks 7 to 9: One piece of gum every 2 to 4 hours.  Weeks 10 to 12: One piece of gum every 4 to 8 hours.     We will follow up on your ultrasound results.    Come back in 1 month to see me.  "
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
## 2023-12-04 ENCOUNTER — OFFICE VISIT (OUTPATIENT)
Dept: MATERNAL FETAL MEDICINE | Facility: HOSPITAL | Age: 38
End: 2023-12-04
Attending: OBSTETRICS & GYNECOLOGY
Payer: COMMERCIAL

## 2023-12-04 ENCOUNTER — ANCILLARY PROCEDURE (OUTPATIENT)
Dept: ULTRASOUND IMAGING | Facility: HOSPITAL | Age: 38
End: 2023-12-04
Attending: OBSTETRICS & GYNECOLOGY
Payer: COMMERCIAL

## 2023-12-04 DIAGNOSIS — O09.521 MULTIGRAVIDA OF ADVANCED MATERNAL AGE IN FIRST TRIMESTER: ICD-10-CM

## 2023-12-04 DIAGNOSIS — O35.BXX0 ANOMALY OF HEART OF FETUS AFFECTING PREGNANCY, ANTEPARTUM, SINGLE OR UNSPECIFIED FETUS: ICD-10-CM

## 2023-12-04 DIAGNOSIS — O09.521 SUPERVISION OF ELDERLY MULTIGRAVIDA, FIRST TRIMESTER: ICD-10-CM

## 2023-12-04 DIAGNOSIS — O28.3 ABNORMAL FETAL ULTRASOUND: Primary | ICD-10-CM

## 2023-12-04 PROCEDURE — 76811 OB US DETAILED SNGL FETUS: CPT | Mod: 26 | Performed by: OBSTETRICS & GYNECOLOGY

## 2023-12-04 PROCEDURE — 76811 OB US DETAILED SNGL FETUS: CPT

## 2023-12-04 PROCEDURE — 99214 OFFICE O/P EST MOD 30 MIN: CPT | Mod: 25 | Performed by: OBSTETRICS & GYNECOLOGY

## 2023-12-04 NOTE — NURSING NOTE
Sampson QUACH Mo is a  at 18w5d who presents to [facility] for L2 ultrasound. Pt reports positive fetal movement. Pt denies bldg/lof/change in discharge, contractions, headache, vision changes, chest pain/SOB or edema. SBAR given to Dr. Smith, see note in Epic.

## 2023-12-05 ENCOUNTER — HOSPITAL ENCOUNTER (OUTPATIENT)
Dept: CARDIOLOGY | Facility: CLINIC | Age: 38
Discharge: HOME | End: 2023-12-05
Attending: OBSTETRICS & GYNECOLOGY | Admitting: OBSTETRICS & GYNECOLOGY
Payer: COMMERCIAL

## 2023-12-05 ENCOUNTER — OFFICE VISIT (OUTPATIENT)
Dept: CARDIOLOGY | Facility: CLINIC | Age: 38
End: 2023-12-05
Payer: COMMERCIAL

## 2023-12-05 DIAGNOSIS — O28.3 ABNORMAL FETAL ULTRASOUND: ICD-10-CM

## 2023-12-05 DIAGNOSIS — O35.BXX0 FETAL CARDIAC ANOMALY AFFECTING PREGNANCY, ANTEPARTUM: Primary | ICD-10-CM

## 2023-12-05 DIAGNOSIS — O35.BXX0 FETAL CARDIAC DISEASE AFFECTING PREGNANCY, SINGLE OR UNSPECIFIED FETUS: Primary | ICD-10-CM

## 2023-12-05 PROCEDURE — 76827 ECHO EXAM OF FETAL HEART: CPT | Mod: 26 | Performed by: PEDIATRICS

## 2023-12-05 PROCEDURE — 76825 ECHO EXAM OF FETAL HEART: CPT | Mod: 26 | Performed by: PEDIATRICS

## 2023-12-05 PROCEDURE — 93325 DOPPLER ECHO COLOR FLOW MAPG: CPT | Mod: 26 | Performed by: PEDIATRICS

## 2023-12-05 PROCEDURE — 93325 DOPPLER ECHO COLOR FLOW MAPG: CPT

## 2023-12-05 PROCEDURE — 99204 OFFICE O/P NEW MOD 45 MIN: CPT | Mod: 25 | Performed by: PEDIATRICS

## 2023-12-05 NOTE — PROGRESS NOTES
Please see the imaging tab for details of the ultrasound performed today.    We reviewed the findings of today's ultrasound with the assistance diagrams/pictures that there appears to be a complex congenital heart defect on ultrasound today most consistent with a diagnosis of Tetralogy of Fallot, or less likely a truncus arteriosus. Tetralogy of Fallot is the most common causes of congenital heart diseases comprising 7-10% of all cases of CHD. We reviewed that it is a heart defect that involves a ventricular septal defect (VSD), overriding aorta, pulmonary stenosis or atresia and subsequent right ventricular hypertrophy.    We reviewed that in 45% of cases there is an underlying chromosomal abnormality (most commonly T21, T13/18, 22q11 deletion syndrome). We discussed that in cases related to chromosomal abnormalities there are often other ultrasound findings but not always and that prognosis will be dictated by the underlying aneuploidy/syndrome. We reviewed the options for genetic screening and testing. Ms. Fonseca previously had low risk cell free DNA screening, including for 22q11.2 microdetions. We discussion amniocentesis and reviewed the procedure in detail including the 1 in 400 procedural related risk of loss, as well as  genetics. At the conclusion of our discussion today Ms. Fonseca declined diagnostic testing during pregnancy as she shared she would not want to learn of bad news. She does desire  genetic testing.    We discussed that if genetic evaluation is normal and this is an isolated cardiac defect then the prognosis is excellent following cardiac repair, but that surgical repair is required. We briefly reviewed that these cases need to be delivered in a tertiary care center with access to the NICU/Pediatric Cardiology and Surgical services.     We reviewed that the next steps include: genetic evaluation (they have declined amniocentesis and desire  genetics), confirmation of the  diagnosis via fetal echocardiogram with pediatric cardiology (scheduled tomorrow at 12 pm), and follow-up ultrasound evaluation for fetal growth with MFM in 4 weeks.     Pili Smith MD  Specialist in Maternal-Fetal Medicine

## 2023-12-05 NOTE — PROGRESS NOTES
[facility]   Heart Center Fetal Consult Note    Patient:  Sampson Fonseca MRN:  [id number]   YOB: 1985 Age:  38 year old   Date of Visit:  2023 PCP:  Ana M Pace MD     Dear Doctor,     I had the pleasure of seeing Sampson Fonseca at the [facility] on 2023 in fetal cardiology consultation for fetal echocardiogram results. She presented today accompanied by her sister. As you know, she is a 38 year old  at 18w6d who presented for fetal echocardiogram today because of suspected Tetralogy of Fallot.    I performed and interpreted the fetal echocardiogram today, which demonstrated tetralogy of fallot vs. 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 The branch pulmonary arteries are confluent; qualitatively hypoplastic but not well seen to accurately measure. Left aortic arch. Normal right and left ventricular size and systolic function. No pericardial effusion.     With the help of diagrams, I reviewed the echo findings today with Sampson Fonseca and her sister with the assistance of a telephone  for Ayla. I discussed the fetal cardiac anatomy, function, and physiology. We will need to monitor the size of the pulmonary valve and artery as she progresses through her pregnancy to help determine the post-kailey course. At this time, the flow across the ductus arteriosus is antegrade, but will need to be monitored on subsequent fetal echocardiograms. I recommended delivery at [facility], with plans for monitoring oxygen levels. I reviewed the importance of a post- transthoracic echocardiogram to confirm these findings and help direct management. She had appropriate questions which I addressed. I provided her with my direct contact information for additional questions after they  left.    Plan:    1. Follow up Fetal Echo at 26 and 30-32 weeks gestation    Thank you for allowing me to participate in Sampson's care. Please do not hesitate to contact me with questions or concerns.    This visit was separate from the performance and interpretation of the ultrasound. The majority of the time (>50%) was spent in counseling and coordination of care. I spent approximately 55 minutes in face-to-face time reviewing the above considerations.    Anaya Johnson M.D.  Pediatric Cardiology  [facility] [address], 5th floor, [place] [address]  Phone [phone]  Fax [phone]

## 2023-12-20 ENCOUNTER — DOCUMENTATION ONLY (OUTPATIENT)
Dept: MATERNAL FETAL MEDICINE | Facility: CLINIC | Age: 38
End: 2023-12-20
Payer: COMMERCIAL

## 2023-12-20 NOTE — PROGRESS NOTES
Patient's pregnancy reviewed with pediatric genetics who recommend SNP array with limited gbands on cord blood at deliver and an inpatient genetics consult.  This care plan will be reviewed with Sampson at her upcoming [facility] appointment for genetic counseling.    Gregg Joaquin MS, [facility]  Licensed Genetic Counselor  Phone: [phone]  Pager: [phone]

## 2023-12-26 ENCOUNTER — OFFICE VISIT (OUTPATIENT)
Dept: MATERNAL FETAL MEDICINE | Facility: HOSPITAL | Age: 38
End: 2023-12-26
Attending: OBSTETRICS & GYNECOLOGY
Payer: COMMERCIAL

## 2023-12-26 ENCOUNTER — ANCILLARY PROCEDURE (OUTPATIENT)
Dept: ULTRASOUND IMAGING | Facility: HOSPITAL | Age: 38
End: 2023-12-26
Attending: OBSTETRICS & GYNECOLOGY
Payer: COMMERCIAL

## 2023-12-26 DIAGNOSIS — O28.3 ABNORMAL FETAL ULTRASOUND: ICD-10-CM

## 2023-12-26 DIAGNOSIS — O35.9XX0 FETAL ABNORMALITY AFFECTING MANAGEMENT OF MOTHER, SINGLE OR UNSPECIFIED FETUS: ICD-10-CM

## 2023-12-26 DIAGNOSIS — O35.BXX0 ANOMALY OF HEART OF FETUS AFFECTING PREGNANCY, ANTEPARTUM, SINGLE OR UNSPECIFIED FETUS: Primary | ICD-10-CM

## 2023-12-26 DIAGNOSIS — Z31.5 ENCOUNTER FOR PROCREATIVE GENETIC COUNSELING: Primary | ICD-10-CM

## 2023-12-26 PROCEDURE — 76816 OB US FOLLOW-UP PER FETUS: CPT

## 2023-12-26 PROCEDURE — 99213 OFFICE O/P EST LOW 20 MIN: CPT | Mod: 25 | Performed by: OBSTETRICS & GYNECOLOGY

## 2023-12-26 PROCEDURE — 76816 OB US FOLLOW-UP PER FETUS: CPT | Mod: 26 | Performed by: OBSTETRICS & GYNECOLOGY

## 2023-12-26 PROCEDURE — 999N000069 HC STATISTIC GENETIC COUNSELING, < 16 MIN: Performed by: GENETIC COUNSELOR, MS

## 2023-12-26 NOTE — PROGRESS NOTES
[facility]etal [facility]  Genetic Counseling Consult    Patient:  Sampson Fonseca  Preferred Name: Sampson YOB: 1985   Date of Service:  23   MRN: [id number]    Sampson was seen at the [facility] for genetic consultation. The indication for genetic counseling is coordination for  genetics evaluations. The patient was accompanied to this visit by their sister-in-law, oldest son, and a family friend.    The session was conducted with a Malagasy ipad  (Language Line ID [id number]) due to the patient speaking limited English.      IMPRESSION/ PLAN   1. Sampson had genetic screening earlier in this pregnancy. Their non-invasive prenatal test was screen negative or low risk for screened conditions     2. During today's [facility] visit, Sampson met with genetic counseling to discuss and coordinate recommendations for  genetics care.  Sampson completed consent for genetic testing of umbilical cord blood and agreed with the following genetics care plan after delivery:  [id number] Microarray with limited g-bands on umbilical cord blood.  Collect BOTH 5ml sodium heparin (green top) AND 5ml EDTA (purple top)  Inpatient genetics consult prior to discharge  Both orders for this care plan have been signed and held in the fetal chart for admission.     3. Sampson has had a complete genetic counseling session earlier in pregnancy, please see corresponding documentation for details.     4. Sampson requested to know if it is possible for the family to coordinate an in-person  they know for her delivery.  [facility] will work to review this request as a part of planning for her delivery.      PREGNANCY HISTORY   /Parity:       Sampson's pregnancy history is significant for:   4 prior full term deliveries with no reported obstetric complications    CURRENT PREGNANCY   Current Age: 38 year old   Age at Delivery: 38 year old  DUNCAN: 2024, by  Ultrasound                                   Gestational Age: 21w6d  This pregnancy is a single gestation.   This pregnancy was conceived spontaneously.    MEDICAL HISTORY   Sampson s reported medical history is not expected to impact pregnancy management or risks to fetal development.          DISCUSSION     Sampson met with genetic counseling after her ultrasound today to discuss recommendations for genetic evaluations after delivery.  We briefly reviewed her negative NIPT results, which significantly reduce the risks for common chromosome abnormalities and 22q11.2 deletion syndrome.  We discussed that that screening cannot 100% rule out any of those conditions, and there are other genetic conditions that can cause heart defects that cannot be assessed with NIPT screening. For this reason pediatric genetics recommends diagnostic testing via SNP microarray after delivery, and after reviewing the benefits and limitations of genetic testing, as well as how a genetic test result might be used to help guide medical management for her daughter, Sampson completed consent for testing on cord blood.  Orders for an inpatient genetics consult after delivery and [id number] (snp array with limited gbands) have been signed and held for admission in the fetal chart.      Separately, Sampson asked if it is possible to plan using an  provided by the family for her care at/after delivery. This request will be forwarded on to our patient care coordinators to review as a part of delivery planning for Sampson.     All of Sampson's questions were answered to her satisfaction today and she was encouraged to remain in contact as needed moving forward.       It was a pleasure to be involved with Sampson s care. Face-to-face time of the meeting was 15 minutes.    Gregg Joaquin, SAMINA, MS, [facility]  Board Certified and [place] Licensed Genetic Counselor   [facility]  Maternal Fetal Medicine  Office: [phone]  [facility]: [phone]   Fax:  [phone]  [facility]

## 2023-12-26 NOTE — PROGRESS NOTES
Please see full imaging report from ViewPoint program under imaging tab.    Thank-you for referring your patient for a follow up comprehensive ultrasound. 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     I discussed the findings on today's ultrasound with the patient and her family using a Namibian  on the iPad . I reviewed the limitations of ultrasound, our findings today and her low risk NIPT. She will meet with our genetic counselor today to discuss  karyotype assessment.     She has not heard from our team at the [facility] clinic, and we will set this up with our team to coordinate transfer of care around 26 weeks, with shared care locally as possible for maternal needs (transportation is a challenge for them).     Further ultrasound studies will include serial growth every 4 weeks, BPPs in the third trimester, and serial fetal echo with pediatric cardiology.     Return to primary provider for continued prenatal care.    If you have questions regarding today's evaluation or if we can be of further service, please contact the Maternal-Fetal Medicine Center.    I spent a total of 20 minutes on the date of this encounter including preparing to see the patient (reviewing medical records/tests), counseling and discussing the plan of care, documenting the visit in the electronic medical record, and communicating with other health care professionals and/or care coordination.    Maryann Vela MD  Maternal Fetal Medicine

## 2023-12-29 ENCOUNTER — OFFICE VISIT (OUTPATIENT)
Dept: FAMILY MEDICINE | Facility: CLINIC | Age: 38
End: 2023-12-29
Payer: COMMERCIAL

## 2023-12-29 VITALS
WEIGHT: 148.2 LBS | HEART RATE: 103 BPM | HEIGHT: 59 IN | BODY MASS INDEX: 29.88 KG/M2 | TEMPERATURE: 98.3 F | OXYGEN SATURATION: 100 % | DIASTOLIC BLOOD PRESSURE: 70 MMHG | SYSTOLIC BLOOD PRESSURE: 104 MMHG | RESPIRATION RATE: 16 BRPM

## 2023-12-29 DIAGNOSIS — O35.BXX0 ANOMALY OF HEART OF FETUS AFFECTING PREGNANCY, ANTEPARTUM, SINGLE OR UNSPECIFIED FETUS: Primary | ICD-10-CM

## 2023-12-29 DIAGNOSIS — O09.529 SUPERVISION OF HIGH-RISK PREGNANCY OF ELDERLY MULTIGRAVIDA: Primary | ICD-10-CM

## 2023-12-29 DIAGNOSIS — O35.BXX0 ANOMALY OF HEART OF FETUS AFFECTING PREGNANCY, ANTEPARTUM, SINGLE OR UNSPECIFIED FETUS: ICD-10-CM

## 2023-12-29 DIAGNOSIS — Z72.0 TOBACCO ABUSE: ICD-10-CM

## 2023-12-29 DIAGNOSIS — K03.6 BETEL DEPOSIT ON TEETH: ICD-10-CM

## 2023-12-29 PROCEDURE — 99207 PR PRENATAL VISIT: CPT | Mod: GC

## 2023-12-29 NOTE — PROGRESS NOTES
Preceptor Attestation:    I discussed the patient with the resident and evaluated the patient in person. I have verified the content of the note, which accurately reflects my assessment of the patient and the plan of care.    Fetus with significant cardiac abnormalities, already has appointment scheduled at [place], as will need to deliver there.  Appreciate [facility] recommendations.      Supervising Physician:  Martita Carey MD.

## 2023-12-29 NOTE — PATIENT INSTRUCTIONS
Nice to see you today!    Here's what we talked about:  - Try chewing more gum to see if this can help you cut back on chewing tobacco/betel nut - up to 8-9 times/day of chewing gum.  - Come back in 2 weeks to see me.

## 2023-12-29 NOTE — PROGRESS NOTES
Assessment & Plan  Sampson Fonseca is a 38 year old , at 22w2d weeks of pregnancy with DUNCAN of May 1, 2024 by  6 week ultrasound.  Patient's history is high risk for the following reasons: fetal tetralogy of Fallot, language barrier, AMA, tobacco and betel nut usage.     Sampson was seen today for prenatal care and medication reconciliation.    Diagnoses and all orders for this visit:    Supervision of high-risk pregnancy of elderly multigravida  Anomaly of heart of fetus affecting pregnancy, antepartum, single or unspecified fetus  Doing well today, appropriate weight gain and some intermittent leg swelling reported, but none noted today on exam. Following with M for tetralogy of Fallot - see their notes for further plan.  Further ultrasound studies include serial growth every 4 weeks, BPP's in the third trimester, and serial fetal echos with pediatric cardiology.  Will also do  genetic testing.  Will do shared OB care with [facility] with plan to deliver at [place] with appropriate pediatric care. Will have patient establish care at [place] with plan to alternate visits with us and them every 2 weeks - first [place] appt in 1 month; will see me in 2 weeks. Patient agreeable to plan. Will also work to secure more in-person  services for patient per her request.    Tobacco abuse  Betel deposit on teeth  Slightly decreased chewing tobacco/betel nut usage with NRT gum. Advised patient to try increasing gum usage to see if this helps her cut down chewing tobacco/betel nut usage further.      Weight gain adequate: 5.987 kg (13 lb 3.2 oz) to date, out of recommended total of 15-25 lbs (pregravid BMI 25-29.9)    Return to clinic in 2 weeks.    Ana M Pace MD  I precepted today with Alyssa Carey MD.    Subjective  Concerns: Has some questions about transfer of care and what care for baby will look like after they are born.    Chewing nicotine gum 4-5 times/day and has slightly decreased chewing  "tobacco/betel nut from 4-5 times/day to 3 times/day.    ROS:  YES - Headache - typical headache; no change  No - Changes in vision  No - Chest Pain  No - Shortness of Breath  No - Nausea   No - Vomiting  No - Abdominal pain   No - Contractions  No - Dysuria   No - Vaginal Discharge    No - Vaginal bleeding   No - Loss of Fluid   YES - Extremity swelling - sometimes, comes and goes, none right now  Present - Fetal movement       Patient Active Problem List   Diagnosis    Language barrier, cultural differences    Tobacco abuse    Betel deposit on teeth    Sebaceous cyst    Migraine    AMA (advanced maternal age) multigravida 35+    Supervision of high-risk pregnancy of elderly multigravida    Fetal cardiac anomaly affecting pregnancy, antepartum       Sampson D Mo speaks Tajik so an  was used today.    Guidance:   smoking intervention  OTC medications  Weight gain    Going to WIC? Yes    Objective  /70 (BP Location: Left arm, Patient Position: Sitting, Cuff Size: Adult Regular)   Pulse 103   Temp 98.3  F (36.8  C) (Oral)   Resp 16   Ht 1.499 m (4' 11\")   Wt 67.2 kg (148 lb 3.2 oz)   LMP 06/13/2023   SpO2 100%   BMI 29.93 kg/m    No distress.  Gravid abdomen.  .  Fundal height 22 cm.  no edema.    Results  Blood type: B POS  No results found for any visits on 12/29/23.    "

## 2024-01-02 PROBLEM — K03.6 BETEL DEPOSIT ON TEETH: Status: ACTIVE | Noted: 2023-10-09

## 2024-01-02 PROBLEM — Z72.0 TOBACCO ABUSE: Status: ACTIVE | Noted: 2023-10-09

## 2024-01-02 PROBLEM — O35.BXX0 FETAL CARDIAC ANOMALY AFFECTING PREGNANCY, ANTEPARTUM: Status: ACTIVE | Noted: 2023-12-05

## 2024-01-05 DIAGNOSIS — O35.BXX0 ANOMALY OF HEART OF FETUS AFFECTING PREGNANCY, ANTEPARTUM, SINGLE OR UNSPECIFIED FETUS: Primary | ICD-10-CM

## 2024-01-15 ENCOUNTER — OFFICE VISIT (OUTPATIENT)
Dept: FAMILY MEDICINE | Facility: CLINIC | Age: 39
End: 2024-01-15
Payer: COMMERCIAL

## 2024-01-15 VITALS
BODY MASS INDEX: 29.61 KG/M2 | RESPIRATION RATE: 16 BRPM | TEMPERATURE: 97.8 F | HEART RATE: 111 BPM | OXYGEN SATURATION: 99 % | HEIGHT: 60 IN | WEIGHT: 150.8 LBS | SYSTOLIC BLOOD PRESSURE: 115 MMHG | DIASTOLIC BLOOD PRESSURE: 77 MMHG

## 2024-01-15 DIAGNOSIS — O09.529 SUPERVISION OF HIGH-RISK PREGNANCY OF ELDERLY MULTIGRAVIDA: Primary | ICD-10-CM

## 2024-01-15 DIAGNOSIS — K03.6 BETEL DEPOSIT ON TEETH: ICD-10-CM

## 2024-01-15 DIAGNOSIS — Z72.0 TOBACCO ABUSE: ICD-10-CM

## 2024-01-15 DIAGNOSIS — O35.BXX0 ANOMALY OF HEART OF FETUS AFFECTING PREGNANCY, ANTEPARTUM, SINGLE OR UNSPECIFIED FETUS: ICD-10-CM

## 2024-01-15 LAB
BASOPHILS # BLD AUTO: 0 10E3/UL (ref 0–0.2)
BASOPHILS NFR BLD AUTO: 0 %
EOSINOPHIL # BLD AUTO: 0.2 10E3/UL (ref 0–0.7)
EOSINOPHIL NFR BLD AUTO: 2 %
ERYTHROCYTE [DISTWIDTH] IN BLOOD BY AUTOMATED COUNT: 12.6 % (ref 10–15)
GLUCOSE 1H P 50 G GLC PO SERPL-MCNC: 153 MG/DL (ref 70–129)
HCT VFR BLD AUTO: 34.4 % (ref 35–47)
HGB BLD-MCNC: 12.4 G/DL (ref 11.7–15.7)
IMM GRANULOCYTES # BLD: 0.1 10E3/UL
IMM GRANULOCYTES NFR BLD: 1 %
LYMPHOCYTES # BLD AUTO: 1.5 10E3/UL (ref 0.8–5.3)
LYMPHOCYTES NFR BLD AUTO: 13 %
MCH RBC QN AUTO: 31.1 PG (ref 26.5–33)
MCHC RBC AUTO-ENTMCNC: 36 G/DL (ref 31.5–36.5)
MCV RBC AUTO: 86 FL (ref 78–100)
MONOCYTES # BLD AUTO: 0.5 10E3/UL (ref 0–1.3)
MONOCYTES NFR BLD AUTO: 5 %
NEUTROPHILS # BLD AUTO: 8.9 10E3/UL (ref 1.6–8.3)
NEUTROPHILS NFR BLD AUTO: 79 %
PLATELET # BLD AUTO: 240 10E3/UL (ref 150–450)
RBC # BLD AUTO: 3.99 10E6/UL (ref 3.8–5.2)
T PALLIDUM AB SER QL: NONREACTIVE
WBC # BLD AUTO: 11.3 10E3/UL (ref 4–11)

## 2024-01-15 PROCEDURE — 36415 COLL VENOUS BLD VENIPUNCTURE: CPT

## 2024-01-15 PROCEDURE — 85025 COMPLETE CBC W/AUTO DIFF WBC: CPT

## 2024-01-15 PROCEDURE — 86780 TREPONEMA PALLIDUM: CPT

## 2024-01-15 PROCEDURE — 99207 PR PRENATAL VISIT: CPT | Mod: GC

## 2024-01-15 PROCEDURE — 82950 GLUCOSE TEST: CPT

## 2024-01-15 NOTE — PROGRESS NOTES
Assessment & Plan  Sampson Fonseca is a 38 year old , at 24w5d weeks of pregnancy with DUNCAN of May 1, 2024 by  6 week ultrasound.  Patient's history is high risk for the following reasons: fetal tetralogy of Fallot, language barrier, AMA, tobacco and betel nut usage.    Sampson was seen today for prenatal care.    Diagnoses and all orders for this visit:    Supervision of high-risk pregnancy of elderly multigravida  Anomaly of heart of fetus affecting pregnancy, antepartum, single or unspecified fetus  Doing well, adequate weight gain and measuring appropriate for dates.  No concerns today.  Will do 1 hour GTT, CBC, and syphilis screen today.  Follow-up with [facility] in 2 weeks on  and with me in 4 weeks.  Spoke with Jose Manuel Branch , and will try to have him be there for 4-week follow-up clinic visit with me,  [facility] visit, and hopefully for delivery at [place].  -     Gestational glucose tolerance testing (GTT), 1 hour  -     CBC with platelets differential; Future  -     Treponema Abs w Reflex to RPR and Titer; Future    Update: 1 hour GTT elevated at 153; will have patient return for 3 hour GTT.    Tobacco abuse  Betel deposit on teeth  Currently chewing tobacco mixed with betel nut 5 times a day.  Discussed switching to other NRT options given patient experiencing some mouth stinging with chewing gum.  Patient would rather stick with chewing gum at this time and will try to cut down usage on her own.    Counseling given: Yes  Tobacco comments: Chews betel nut and tobacco 4-6 x's per day      Weight gain adequate: 7.167 kg (15 lb 12.8 oz) to date, out of recommended total of 15-25 lbs (pregravid BMI 25-29.9)    Return to clinic in 4 weeks - alternating visits between [facility] and our clinic (next [facility] visit on ).      Ana M Pace MD  I precepted today with Kenny Jones MD.      Subjective  Concerns: no concerns. No leg swelling since our last visit.    States that chewing more nicotine gum causes her mouth  to sting. Currently chewing betel nut/tobacco mixed together 5 times a day.    Still not able to have in-person  for [place] clinic visits. Spoke with Sanjiv Bravo - he can be there for her next clinic visit here on , her [facility] visit on , and for delivery at [place] if still planning at 39 weeks (around ).    ROS:  No - Headache  No - Changes in vision  No - Chest Pain  No - Shortness of Breath  No - Nausea   No - Vomiting  No - Abdominal pain   No - Contractions  No - Dysuria   No - Vaginal Discharge    No - Vaginal bleeding   No - Loss of Fluid   No - Extremity swelling   Present - Fetal movement     Going to WIC? Yes    Risk Assessment  Average Risk Category  No significant risk factors: Yes    At Risk Category (up to 3)  Teen pregnancy: No  Poor social situation: No  Domestic abuse: No  Financial difficulties: No  Smoker: No  H/O  deliver: No  H/O drug abuse: No  Non-English speaking: Yes  Advanced maternal age: Yes  GDM risks: Yes  Previous C/S: No  H/O PIH: No  H/O STIs: No  H/O mental health concerns: No  Onset care > 20 weeks: No  Other: chewing tobacco, betel nut usage    High Risk Category (4 or more At Risk or)  Diabetes/GDM: No  Multiple gestation: No  Chronic hypertension: No  Significant hx of asthma: No  Fetal demise > 20 weeks: No  Positive tox screen: No  Current mental health treatment: No  Other: Tetralogy of Fallot    Risk: High Risk   Date determined: 1/15/2024    Patient Active Problem List   Diagnosis    Language barrier, cultural differences    Tobacco abuse    Betel deposit on teeth    Sebaceous cyst    Migraine    AMA (advanced maternal age) multigravida 35+    Supervision of high-risk pregnancy of elderly multigravida    Fetal cardiac anomaly affecting pregnancy, antepartum       Sampson D Mo speaks Lebanese so an  was used today.    Guidance:   Breastfeeding - yes  GDM  signs of  labor    Objective  /77   Pulse 111   Temp 97.8  F (36.6  C)   " Resp 16   Ht 1.511 m (4' 11.5\")   Wt 68.4 kg (150 lb 12.8 oz)   LMP 06/13/2023   SpO2 99%   BMI 29.95 kg/m    No distress.  Gravid abdomen.  .  Fundal height 24 cm.  no edema.    Results  Blood type: B POS  No results found for any visits on 01/15/24.    "

## 2024-01-15 NOTE — PROGRESS NOTES
Preceptor Attestation:    I discussed the patient with the resident and evaluated the patient in person. I have verified the content of the note, which accurately reflects my assessment of the patient and the plan of care.   Supervising Physician:  Kenny Jones MD.

## 2024-01-15 NOTE — PATIENT INSTRUCTIONS
Nice to see you today!    Here's what we talked about:  - I will work with Desiree to have Sanjiv Bravo come to your future visits, but you might have to have a phone  for your 1/31 visit with maternal fetal medicine.  - Come back to see me on 2/16.  - I will send you a letter with your results if they are normal, and will call you if they are not normal.

## 2024-01-15 NOTE — Clinical Note
Ananth Ramírez,  I was talking with Sampson about arranging to have Sanjiv Bravo be there for her appointments/delivery and was wondering if you could help arrange this for her [facility] visits. Sanjiv Bravo can do deliveries too and says he can make it to [place] for her delivery!  - 1/31 (with MFM): Sanjiv Bravo can't make it that day - would you be able to ask if we could try to get another in-person  for that day? - 2/16 (with me): Sanjiv Bravo can be there - 2/28: (with MFM): Sanjiv Bravo can be there - would you be able to help arrange this? - If planning delivery at [place] at 39 weeks (around 4/24): Sanjiv Bravo can be there!!  I wasn't sure how to arrange this, so wanted to pass this along to you. Let me know if I can help in any way. Thank you!!   Ana M

## 2024-01-16 ENCOUNTER — LAB (OUTPATIENT)
Dept: LAB | Facility: CLINIC | Age: 39
End: 2024-01-16
Payer: COMMERCIAL

## 2024-01-16 DIAGNOSIS — O09.529 SUPERVISION OF HIGH-RISK PREGNANCY OF ELDERLY MULTIGRAVIDA: ICD-10-CM

## 2024-01-16 LAB
GESTATIONAL GTT 1 HR POST DOSE: 187 MG/DL (ref 60–179)
GESTATIONAL GTT 2 HR POST DOSE: 153 MG/DL (ref 60–154)
GESTATIONAL GTT 3 HR POST DOSE: 105 MG/DL (ref 60–139)
GLUCOSE P FAST SERPL-MCNC: 89 MG/DL (ref 60–94)

## 2024-01-16 PROCEDURE — 82951 GLUCOSE TOLERANCE TEST (GTT): CPT

## 2024-01-16 PROCEDURE — 36415 COLL VENOUS BLD VENIPUNCTURE: CPT

## 2024-01-16 PROCEDURE — 82952 GTT-ADDED SAMPLES: CPT

## 2024-01-31 ENCOUNTER — HOSPITAL ENCOUNTER (OUTPATIENT)
Dept: CARDIOLOGY | Facility: CLINIC | Age: 39
Discharge: HOME | End: 2024-01-31
Attending: STUDENT IN AN ORGANIZED HEALTH CARE EDUCATION/TRAINING PROGRAM
Payer: COMMERCIAL

## 2024-01-31 ENCOUNTER — OFFICE VISIT (OUTPATIENT)
Dept: CARDIOLOGY | Facility: CLINIC | Age: 39
End: 2024-01-31
Payer: COMMERCIAL

## 2024-01-31 ENCOUNTER — HOSPITAL ENCOUNTER (OUTPATIENT)
Dept: ULTRASOUND IMAGING | Facility: CLINIC | Age: 39
Discharge: HOME | End: 2024-01-31
Attending: OBSTETRICS & GYNECOLOGY
Payer: COMMERCIAL

## 2024-01-31 ENCOUNTER — OFFICE VISIT (OUTPATIENT)
Dept: MATERNAL FETAL MEDICINE | Facility: CLINIC | Age: 39
End: 2024-01-31
Attending: OBSTETRICS & GYNECOLOGY
Payer: COMMERCIAL

## 2024-01-31 DIAGNOSIS — O35.BXX0 ANOMALY OF HEART OF FETUS AFFECTING PREGNANCY, ANTEPARTUM, SINGLE OR UNSPECIFIED FETUS: ICD-10-CM

## 2024-01-31 DIAGNOSIS — O35.BXX0 FETAL CARDIAC DISEASE AFFECTING PREGNANCY, SINGLE OR UNSPECIFIED FETUS: Primary | ICD-10-CM

## 2024-01-31 DIAGNOSIS — O35.BXX0 FETAL CARDIAC ANOMALY AFFECTING PREGNANCY, ANTEPARTUM: ICD-10-CM

## 2024-01-31 DIAGNOSIS — O35.9XX0 FETAL ABNORMALITY AFFECTING MANAGEMENT OF MOTHER, ANTEPARTUM, SINGLE OR UNSPECIFIED FETUS: Primary | ICD-10-CM

## 2024-01-31 PROCEDURE — 93325 DOPPLER ECHO COLOR FLOW MAPG: CPT | Mod: 26 | Performed by: PEDIATRICS

## 2024-01-31 PROCEDURE — 76827 ECHO EXAM OF FETAL HEART: CPT | Mod: 26 | Performed by: PEDIATRICS

## 2024-01-31 PROCEDURE — 93325 DOPPLER ECHO COLOR FLOW MAPG: CPT

## 2024-01-31 PROCEDURE — 76825 ECHO EXAM OF FETAL HEART: CPT | Mod: 26 | Performed by: PEDIATRICS

## 2024-01-31 PROCEDURE — 99417 PROLNG OP E/M EACH 15 MIN: CPT | Performed by: PEDIATRICS

## 2024-01-31 PROCEDURE — 76816 OB US FOLLOW-UP PER FETUS: CPT

## 2024-01-31 PROCEDURE — 76816 OB US FOLLOW-UP PER FETUS: CPT | Mod: 26 | Performed by: OBSTETRICS & GYNECOLOGY

## 2024-01-31 PROCEDURE — 99215 OFFICE O/P EST HI 40 MIN: CPT | Mod: 25 | Performed by: PEDIATRICS

## 2024-01-31 NOTE — PROGRESS NOTES
Please see full imaging report from ViewPoint program under imaging tab.    Maryann Vela MD  Maternal Fetal Medicine

## 2024-01-31 NOTE — PROGRESS NOTES
[facility]   Heart Center Fetal Consult Note    Patient:  Sampson Fonseca MRN:  [id number]   YOB: 1985 Age:  38 year old   Date of Visit:  2024 PCP:  Ana M Pace MD     Dear Doctor,     I had the pleasure of seeing Sampson Fonseca at the [facility] on 2024 in fetal cardiology consultation for fetal echocardiogram results. She presented today accompanied by her sister. As you know, she is a 38 year old  at 27w0d who presented for fetal echocardiogram today because of follow up for tetralogy of fallot vs double outlet right ventricle.    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 The pulmonary valve and main pulmonary artery are dilated. 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 Moderate hypoplasia of the transverse arch and severe hypoplasia of the aortic isthmus with antegrade flow. The coarctation is juxtaductal. Normal right and left ventricular size and systolic function. No pericardial effusion.     With the help of diagrams, I reviewed the echo findings today with Sampson Fonseca and her partner with the assistance of a [employer] . I discussed the fetal cardiac anatomy, function, and physiology. The aortic arch is concerning and will likely need intervention within the first 1-2 weeks of life. I anticipate that we would either do a full repair vs hybrid approach with PDA stent and PA bands. I counseled on both options and that this will be discussed post-natally at a cardiology conference to determine the best initial step for the fetus. I recommended delivery at [facility], with plans for echocardiogram and likely PGE. I reviewed the importance of a post- transthoracic echocardiogram to confirm these findings and help direct management. She had  appropriate questions which I addressed. I provided her with my direct contact information for additional questions after they left.    Plan:    1. Follow up Fetal Echo in 32-33 weeks gestation.  2. This anatomy is likely dependent on the ductus arteriosus, and PGE will likely be needed following delivery.  3. Transthoracic echocardiogram to be obtained after delivery immediately on arrival in the NICU.     Thank you for allowing me to participate in Sampson's care. Please do not hesitate to contact me with questions or concerns.    This visit was separate from the performance and interpretation of the ultrasound. The majority of the time (>50%) was spent in counseling and coordination of care. I spent approximately 60 minutes in face-to-face time reviewing the above considerations.    Anaya Johnson M.D.  Pediatric Cardiology  [facility] [address], 5th floor, [place] [address]  Phone [phone]  Fax [phone]

## 2024-02-16 ENCOUNTER — CARE COORDINATION (OUTPATIENT)
Dept: FAMILY MEDICINE | Facility: CLINIC | Age: 39
End: 2024-02-16

## 2024-02-16 NOTE — PROGRESS NOTES
Medical Transportation Coordination    Appt Date: 2/21/24               Arrival Time: 3:10 PM    Appt Location & Address:   [facility]    Total family members:   1    Tranportation Name & Phone Number:   [employer] [phone]     Estimated Pick-up Time:   2:10-2:40 PM    Roundtrip?  Yes  Patient Notified?  Yes    Medical Transportation Coordination    Appt Date: 3/1/24               Arrival Time: 9:20 AM    Appt Location & Address:   [facility]    Total family members:   1    Tranportation Name & Phone Number:   [employer] [phone]     Estimated Pick-up Time:   8:20-8:50 AM    Roundtrip?  Yes  Patient Notified?  Yes

## 2024-02-20 NOTE — PROGRESS NOTES
Assessment & Plan  Sampson Fonseca is a 38 year old , at 29w6d weeks of pregnancy with DUNCAN of May 1, 2024 by  6 week ultrasound.  Patient's history is high risk for the following reasons: fetal tetralogy of Fallot, language barrier, AMA, tobacco and betel nut usage.     Sampson was seen today for prenatal care.    Diagnoses and all orders for this visit:    Supervision of high-risk pregnancy of elderly multigravida  -     CBC with platelets; Future  -     Syphilis Screen Cascade (RPR/VDRL); Future  -     Syphilis Screen Cascade (RPR/VDRL)  -     CBC with platelets    Anomaly of heart of fetus affecting pregnancy, antepartum, single or unspecified fetus  Follow-up with M.  Advised patient where to go, Desiree helping patient find an in person  for the visit as she is able to.  -Follow-up with MFM, follow guidelines for tetralogy of Fallot noted    Multigravida of advanced maternal age in third trimester  Language barrier, cultural differences        Weight gain adequate: 9.163 kg (20 lb 3.2 oz) to date, out of recommended total of 15-25 lbs (pregravid BMI 25-29.9)    There are no Patient Instructions on file for this visit.    Return to clinic in 2 weeks.    Leonela Bangura MD  I precepted today with Meghana De La O MD.    Diagnosis or treatment significantly limited by social determinants of health - limited English, limited transportation  Ordering of each unique test  20 minutes spent by me on the date of the encounter doing chart review, history and exam, documentation and further activities per the note        Subjective  Concerns: Did not make it to [facility] last week.     ROS:  No - Headache, occasional baseline migraine  No - Changes in vision  No - Chest Pain  No - Shortness of Breath  No - Nausea   No - Vomiting  No - Abdominal pain   No - Contractions  No - Dysuria   No - Vaginal Discharge    No - Vaginal bleeding   No - Loss of Fluid   No - Extremity swelling   Present - Fetal movement       Going to  "WIC? Yes      Patient Active Problem List   Diagnosis    Language barrier, cultural differences    Tobacco abuse    Betel deposit on teeth    Sebaceous cyst    Migraine    AMA (advanced maternal age) multigravida 35+    Supervision of high-risk pregnancy of elderly multigravida    Fetal cardiac anomaly affecting pregnancy, antepartum       Sampson D Mo speaks Cymraes so an  was used today.    Guidance:  seatbelt use  signs of  labor  RSV vaccine, patient to consider    Do you need help getting a car seat? YES  Do you need help getting a breast pump? YES      Objective  /70   Pulse 111   Temp 97.6  F (36.4  C) (Tympanic)   Resp 20   Ht 1.499 m (4' 11\")   Wt 70.4 kg (155 lb 3.2 oz)   LMP 2023   SpO2 100%   BMI 31.35 kg/m    No distress.  Gravid abdomen.  .  Fundal height 31 cm.  no edema.    Results  Blood type: B POS  No results found for any visits on 24.    "

## 2024-02-21 ENCOUNTER — OFFICE VISIT (OUTPATIENT)
Dept: FAMILY MEDICINE | Facility: CLINIC | Age: 39
End: 2024-02-21
Payer: COMMERCIAL

## 2024-02-21 VITALS
RESPIRATION RATE: 20 BRPM | SYSTOLIC BLOOD PRESSURE: 106 MMHG | WEIGHT: 155.2 LBS | OXYGEN SATURATION: 100 % | BODY MASS INDEX: 31.29 KG/M2 | TEMPERATURE: 97.6 F | HEIGHT: 59 IN | HEART RATE: 111 BPM | DIASTOLIC BLOOD PRESSURE: 70 MMHG

## 2024-02-21 DIAGNOSIS — O09.529 SUPERVISION OF HIGH-RISK PREGNANCY OF ELDERLY MULTIGRAVIDA: Primary | ICD-10-CM

## 2024-02-21 DIAGNOSIS — Z60.3 LANGUAGE BARRIER, CULTURAL DIFFERENCES: ICD-10-CM

## 2024-02-21 DIAGNOSIS — O35.BXX0 ANOMALY OF HEART OF FETUS AFFECTING PREGNANCY, ANTEPARTUM, SINGLE OR UNSPECIFIED FETUS: ICD-10-CM

## 2024-02-21 DIAGNOSIS — O09.523 MULTIGRAVIDA OF ADVANCED MATERNAL AGE IN THIRD TRIMESTER: ICD-10-CM

## 2024-02-21 LAB
ERYTHROCYTE [DISTWIDTH] IN BLOOD BY AUTOMATED COUNT: 12.4 % (ref 10–15)
HCT VFR BLD AUTO: 31 % (ref 35–47)
HGB BLD-MCNC: 11.4 G/DL (ref 11.7–15.7)
MCH RBC QN AUTO: 31.7 PG (ref 26.5–33)
MCHC RBC AUTO-ENTMCNC: 36.8 G/DL (ref 31.5–36.5)
MCV RBC AUTO: 86 FL (ref 78–100)
PLATELET # BLD AUTO: 245 10E3/UL (ref 150–450)
RBC # BLD AUTO: 3.6 10E6/UL (ref 3.8–5.2)
WBC # BLD AUTO: 12.9 10E3/UL (ref 4–11)

## 2024-02-21 PROCEDURE — 99207 PR PRENATAL VISIT: CPT | Mod: GC

## 2024-02-21 PROCEDURE — 36415 COLL VENOUS BLD VENIPUNCTURE: CPT

## 2024-02-21 PROCEDURE — 85027 COMPLETE CBC AUTOMATED: CPT

## 2024-02-21 PROCEDURE — 86780 TREPONEMA PALLIDUM: CPT

## 2024-02-21 SDOH — SOCIAL STABILITY - SOCIAL INSECURITY: ACCULTURATION DIFFICULTY: Z60.3

## 2024-02-21 NOTE — PROGRESS NOTES
Preceptor Attestation:    I discussed the patient with the resident and evaluated the patient in person. I have verified the content of the note, which accurately reflects my assessment of the patient and the plan of care.   Supervising Physician:  Dana De La O MD.

## 2024-02-22 ENCOUNTER — CARE COORDINATION (OUTPATIENT)
Dept: FAMILY MEDICINE | Facility: CLINIC | Age: 39
End: 2024-02-22
Payer: COMMERCIAL

## 2024-02-22 LAB — T PALLIDUM AB SER QL: NONREACTIVE

## 2024-02-22 NOTE — PROGRESS NOTES
Medical Transportation Coordination    Appt Date: 2/28/24               Arrival Time: 8:45 AM    Appt Location & Address:   [facility],  [address], [place] [address]  and return ride will pick her up from [address] [place] [phone] [facility]      Total family members:   1    Tranportation Name & Phone Number:   [employer] [phone]     Estimated Pick-up Time:   7:35-7:45 AM    Roundtrip?  Yes  Patient Notified?  Yes, gave info to Sanjiv Bravo (BOGDAN) and he will let pt know.  Told him to let pt know that [facility] said that they will have in person [facility]  and will have  [facility] after [facility] appt.       Medical Transportation Coordination    Appt Date: 3/11/24               Arrival Time: 8:00 AM    Appt Location & Address:   [facility]    Total family members:   1    Tranportation Name & Phone Number:   [employer] [phone]     Estimated Pick-up Time:   7:15-7:30 AM    Roundtrip?  Yes  Patient Notified?  Yes- info given to Sanjiv DOBBS) and he will let pt know.

## 2024-02-28 ENCOUNTER — OFFICE VISIT (OUTPATIENT)
Dept: MATERNAL FETAL MEDICINE | Facility: CLINIC | Age: 39
End: 2024-02-28
Attending: OBSTETRICS & GYNECOLOGY
Payer: COMMERCIAL

## 2024-02-28 ENCOUNTER — HOSPITAL ENCOUNTER (OUTPATIENT)
Dept: ULTRASOUND IMAGING | Facility: CLINIC | Age: 39
Discharge: HOME | End: 2024-02-28
Attending: OBSTETRICS & GYNECOLOGY
Payer: COMMERCIAL

## 2024-02-28 ENCOUNTER — OFFICE VISIT (OUTPATIENT)
Dept: CARDIOLOGY | Facility: CLINIC | Age: 39
End: 2024-02-28
Payer: COMMERCIAL

## 2024-02-28 ENCOUNTER — HOSPITAL ENCOUNTER (OUTPATIENT)
Dept: CARDIOLOGY | Facility: CLINIC | Age: 39
Discharge: HOME | End: 2024-02-28
Attending: OBSTETRICS & GYNECOLOGY
Payer: COMMERCIAL

## 2024-02-28 VITALS
HEART RATE: 114 BPM | OXYGEN SATURATION: 100 % | WEIGHT: 155.13 LBS | DIASTOLIC BLOOD PRESSURE: 77 MMHG | SYSTOLIC BLOOD PRESSURE: 113 MMHG | BODY MASS INDEX: 31.33 KG/M2

## 2024-02-28 DIAGNOSIS — O35.BXX0 ANOMALY OF HEART OF FETUS AFFECTING PREGNANCY, ANTEPARTUM, SINGLE OR UNSPECIFIED FETUS: ICD-10-CM

## 2024-02-28 DIAGNOSIS — O35.BXX0 FETAL CARDIAC ANOMALY AFFECTING PREGNANCY, ANTEPARTUM: ICD-10-CM

## 2024-02-28 DIAGNOSIS — O09.93 SUPERVISION OF HIGH RISK PREGNANCY IN THIRD TRIMESTER: Primary | ICD-10-CM

## 2024-02-28 DIAGNOSIS — O35.BXX0 FETAL CARDIAC DISEASE AFFECTING PREGNANCY, SINGLE OR UNSPECIFIED FETUS: Primary | ICD-10-CM

## 2024-02-28 DIAGNOSIS — O35.9XX0 FETAL ABNORMALITY AFFECTING MANAGEMENT OF MOTHER, ANTEPARTUM, SINGLE OR UNSPECIFIED FETUS: Primary | ICD-10-CM

## 2024-02-28 PROCEDURE — 76816 OB US FOLLOW-UP PER FETUS: CPT

## 2024-02-28 PROCEDURE — 76825 ECHO EXAM OF FETAL HEART: CPT | Mod: 26 | Performed by: PEDIATRICS

## 2024-02-28 PROCEDURE — G0463 HOSPITAL OUTPT CLINIC VISIT: HCPCS | Mod: 25 | Performed by: ADVANCED PRACTICE MIDWIFE

## 2024-02-28 PROCEDURE — 93325 DOPPLER ECHO COLOR FLOW MAPG: CPT

## 2024-02-28 PROCEDURE — 76827 ECHO EXAM OF FETAL HEART: CPT | Mod: 26 | Performed by: PEDIATRICS

## 2024-02-28 PROCEDURE — 93325 DOPPLER ECHO COLOR FLOW MAPG: CPT | Mod: 26 | Performed by: PEDIATRICS

## 2024-02-28 PROCEDURE — 99213 OFFICE O/P EST LOW 20 MIN: CPT | Mod: 25 | Performed by: ADVANCED PRACTICE MIDWIFE

## 2024-02-28 PROCEDURE — 99215 OFFICE O/P EST HI 40 MIN: CPT | Mod: 25 | Performed by: PEDIATRICS

## 2024-02-28 PROCEDURE — 76816 OB US FOLLOW-UP PER FETUS: CPT | Mod: 26 | Performed by: OBSTETRICS & GYNECOLOGY

## 2024-02-28 NOTE — PROGRESS NOTES
"Maternal fetal Medicine OB Follow up visit.     Sampson Fonseca  : 1985  MRN: [id number]    CC: OB Follow-up conducted with [facility] .    Subjective:  Sampson Fonseca is a 38 year old  at 31w0d presenting for routine OB follow-up. Today, she is feeling well. Offers no OB concerns at this time. Denies regular, painful contractions, denies loss of fluid or vaginal bleeding. Reports fetal movement.      3 of her 4 previous births occurred in [facility] so there are no records to review, but she reports that all were uncomplicated vaginal deliveries.     OB Hx:  OB History    Para Term  AB Living   5 4 4 0 0 4   SAB IAB Ectopic Multiple Live Births   0 0 0 0 4      # Outcome Date GA Lbr Aba/2nd Weight Sex Delivery Anes PTL Lv   5 Current            4 Term 17 38w5d  3.374 kg (7 lb 7 oz) F Vag-Spont None N MARIANNE      Birth Comments: none      Name: Inocencia Mathewyu      Apgar1: 8  Apgar5: 9   3 Term 10/03/10 40w0d  3 kg (6 lb 9.8 oz) F  None N MARIANNE      Birth Comments: jaundice was under the bililights for 12 days, umbilical cord infection \"Ei Phyo Lawrence\"      Name: Ei Phyo Lawrence   2 Term 09 40w0d  2.8 kg (6 lb 2.8 oz) M Vag-Spont None N MARIANNE      Birth Comments: none, \" Manuel Jin Bwar\"      Name: Manuel Jin Bwar   1 Term 05 40w0d  3.2 kg (7 lb 0.9 oz) M Vag-Spont None N MARIANNE      Birth Comments: none- \" Manuel Ne Oo\"      Name: Manuel Manrique Oo         Objective:  /77 (BP Location: Left arm, Patient Position: Sitting, Cuff Size: Adult Regular)   Pulse 114   Wt 70.4 kg (155 lb 2 oz)   LMP 2023   SpO2 100%   BMI 31.33 kg/m    Gen: alert, oriented, NAD  Skin: warm, dry, intact  Respiratory: breathing unlabored, no SOB  Abdominal: gravid, non-tender  Pelvic: deferred  Extremities: WNL  Psych: mood WNL, behavior WNL      OB Ultrasound:  Please see \"imaging\" tab under chart review for today's ultrasound results.    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 The pulmonary  valve and main pulmonary artery are dilated. The aortic valve is mildly  hypoplastic; measures about 0.45 cm with a z-score of -2.34. Moderate to  severe hypoplasia of the transverse arch and severe hypoplasia of the aortic  isthmus with antegrade flow. The coarctation is juxtaductal. Normal right and  left ventricular size and systolic function. No pericardial effusion.  Please refer to consultation note for details. Prostaglandin therapy should be  commenced if there are signs of coarctation, such as poor perfusion, decreased  femoral pulses or a significant upper to lower limb blood. pressure gradient.  Delivery is recommended at [facility]. Cardiology consultation and  echocardiogram is recommended immediately after delivery.    Assessment/Plan:  38 year old  at 31w0d here for follow up OB visit.    Pregnancy has been complicated by:   Maternal dx:  - AMA  -  x4  - Betel nut use    Fetal dx:  - CHD      Transfer of care:  - Oriented patient to [facility] practice. Reviewed that [facility] is a multidisciplinary institution and her care will be collaborative in fashion with [facility] doctors, CNM, residents, fellows, and [facility] clinic for intrapartum management.     Fetal CHD:  - See fetal echo above.  1. This anatomy is likely dependent on the ductus arteriosus, and PGE will likely be needed following delivery.  2. Transthoracic echocardiogram to be obtained after delivery immediately on arrival in the NICU.   3. Recommend CV surgery consultation.   - Will help to coordinate CV surgery and NICU consults.    Routine PNC:  - Prenatal labs:  Rh: +  antibody: neg   HepB/HIV/RPR/HepC: nonreactive   GC/CT: neg   Rubella: immune     GCT: 153  GTT: passed   UC: <10k mixed urogenital filiberto  - Pap smear: due - recommend collection PP  - Immunizations:  s/p Flu. Tdap does not appear to have been given - will  offer at next OB visit.  - Genetic screening: low-risk NIPT. Declines invasive testing  - GBS at 36 weeks      Surveillance:  - Serial growth US.   - Weekly BPPs starting at 32 weeks    Delivery planning:  - Discussed likely 39 week delivery via IOL if spontaneous labor does not occur.   - Labor analgesia: has not used any pain medication in the past, but will review options at upcoming visit  - Feeding: needs to be discussed  - Contraception plans: needs to be discussed    RTC in 1 week for BPP and 2 weeks for BPP with OB visit.    20 minutes spent on the date of the encounter, doing chart review, history and exam, documentation and further activities as noted.      Elvie Bonilla CNM on 2024 at 9:36 AM  \

## 2024-02-28 NOTE — PROGRESS NOTES
[facility]   Heart Center Fetal Consult Note    Patient:  Sampson Fonseca MRN:  [id number]   YOB: 1985 Age:  38 year old   Date of Visit:  2024 PCP:  Ana M Pace MD     Dear Doctor,     I had the pleasure of seeing Sampson Fonseca at the [facility] on 2024 in fetal cardiology consultation for fetal echocardiogram. She presented today accompanied by her family. As you know, she is a 38 year old  at 31w0d who presented for fetal echocardiogram today because of follow up double outlet right ventricle.    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 The pulmonary valve and main pulmonary artery are dilated. The aortic valve is mildly hypoplastic; measures about 0.45 cm with a z-score of -2.34. Moderate to severe hypoplasia of the transverse arch and severe hypoplasia of the aortic isthmus with antegrade flow. The coarctation is juxtaductal. Normal right and left ventricular size and systolic function. No pericardial effusion.     I reviewed the echo findings today with Sampson Fonseca and her partner with the assistance of a Jose Manuel . I discussed the fetal cardiac anatomy, function, and physiology. The aortic arch is concerning and will likely need intervention within the first 1-2 weeks of life. I anticipate that we would either do a full repair vs hybrid approach with PDA stent and PA bands. I counseled on both options and that this will be discussed post-natally at a cardiology conference to determine the best initial step for the fetus at our last visit. The family had no new questions or concerns at this visit. I recommended delivery at [facility], with plans for echocardiogram and likely PGE. I reviewed the importance of a post- transthoracic echocardiogram to confirm these  findings and help direct management. She had appropriate questions which I addressed. I provided her with my direct contact information for additional questions after they left.     Plan:    1. This anatomy is likely dependent on the ductus arteriosus, and PGE will likely needed following delivery.  2. Transthoracic echocardiogram to be obtained after delivery immediately on arrival in the NICU.   3. Recommend CV surgery consultation.     Thank you for allowing me to participate in Sampson's care. Please do not hesitate to contact me with questions or concerns.    This visit was separate from the performance and interpretation of the ultrasound. The majority of the time (>50%) was spent in counseling and coordination of care. I spent approximately 40 minutes in face-to-face time reviewing the above considerations.    Anaya Johnson M.D.  Pediatric Cardiology  [facility] [address], 5th floor, [facility] [address]  Phone [phone]  Fax [phone]

## 2024-02-28 NOTE — NURSING NOTE
Sampson (Citizen of [place] speaking, Ipdad  utilized for visit), seen in clinic today for RL2/growth, fetal echo & OB visit at 31w0d gestation. Vital signs as charted. Pt reports positive fetal movement. Pt evaluated for  labor, preeclampsia, vaginal bleeding, and fetal well being without concerns. Pt denies bleeding/lof/change in contractions/headache/vision changes/chest pain/SOB/edema. Pt notified to review new pt education in AVS, verbally reviewed highlights. Elvie MATHIS CNM met with pt and discussed POC. Plan for growth in 4wks, weekly BPPs starting next week (32wks), and OBV every two weeks. Future visits scheduled at . Pt discharged stable and ambulatory.

## 2024-02-28 NOTE — PATIENT INSTRUCTIONS
"Weeks 30 to 32 of Your Pregnancy: Care Instructions  Your baby is growing more every day. Its eyes can open and close, and it may have hair on its head. Your baby may sleep 20 to 45 minutes at a time and is more active at certain times.    You should feel your baby move several times every day. Your baby now turns less and kicks more.   This is a good time to tour your hospital or birthing center. You may also want to find childcare if needed.     To ease heartburn    Avoid foods that make your symptoms worse, such as chocolate, spicy foods, and caffeine.  Avoid bending over or lying down after meals.  Do not eat for 2 hours before bedtime.  Take antacids like Tums, but don't take ones that have sodium bicarbonate, magnesium trisilicate, or aspirin.    To care for large, swollen veins (varicose veins)    Try to avoid standing for long periods of time.  Sit with your feet propped up.  Wear support hose.  Get some exercise every day, like walking or swimming.  Counting your baby's kicks  Your doctor may ask you to count your baby's movements, such as kicks, flutters, or rolls.    Find a quiet place, and get comfortable. Write down your start time. Count your baby's movements (except hiccups). When your baby has moved 10 times, you can stop counting. Write down how many minutes it took.   If an hour goes by and you don't feel 10 movements, have something to eat or drink. Count for another hour. If you don't feel at least 10 movements in the 2-hour period, call your doctor.   Follow-up care is a key part of your treatment and safety. Be sure to make and go to all appointments, and call your doctor if you are having problems. It's also a good idea to know your test results and keep a list of the medicines you take.  Where can you learn more?  Go to https://www.BioMedFlexwise.net/patiented  Enter X471 in the search box to learn more about \"Weeks 30 to 32 of Your Pregnancy: Care Instructions.\"  Current as of: July 11, " "2023               Content Version: 13.8 2006-2023 [employer].   Care instructions adapted under license by your healthcare professional. If you have questions about a medical condition or this instruction, always ask your healthcare professional. [employer] disclaims any warranty or liability for your use of this information.      Weeks 32 to 34 of Your Pregnancy: Care Instructions    Decide whether you want to bank or donate your baby's umbilical cord blood. If you want to save this blood, you have to arrange for it ahead of time.   Decide about circumcision. Personal, Cheondoism, or cultural beliefs may play a role in your decision. You get to decide what you want for your baby.     Learn how to ease hemorrhoids.    Get more liquids, fruits, vegetables, and fiber in your diet.  Avoid sitting for too long.  Clean yourself with moist toilet paper. Or try witch hazel pads.  Try ice packs or warm sitz baths for discomfort.  Use hydrocortisone cream for pain or itching.  Ask your doctor about stool softeners.    Consider the benefits of breastfeeding.    It reduces your baby's risk of sudden infant death syndrome (SIDS).   babies are less likely to get certain infections. And they're less likely to be obese or get diabetes later in life.  It can lower your risk of breast and ovarian cancers and osteoporosis.  It saves you money.  Follow-up care is a key part of your treatment and safety. Be sure to make and go to all appointments, and call your doctor if you are having problems. It's also a good idea to know your test results and keep a list of the medicines you take.  Where can you learn more?  Go to https://www.Sprig.net/patiented  Enter X711 in the search box to learn more about \"Weeks 32 to 34 of Your Pregnancy: Care Instructions.\"  Current as of: July 11, 2023               Content Version: 13.8 2006-2023 [employer].   Care instructions adapted under " license by your healthcare professional. If you have questions about a medical condition or this instruction, always ask your healthcare professional. Healthwise, Incorporated disclaims any warranty or liability for your use of this information.

## 2024-03-01 PROBLEM — O09.529 AMA (ADVANCED MATERNAL AGE) MULTIGRAVIDA 35+: Status: ACTIVE | Noted: 2023-10-09

## 2024-03-07 DIAGNOSIS — O35.BXX0 FETAL CARDIAC ANOMALY AFFECTING PREGNANCY, ANTEPARTUM: Primary | ICD-10-CM

## 2024-03-08 ENCOUNTER — OFFICE VISIT (OUTPATIENT)
Dept: MATERNAL FETAL MEDICINE | Facility: HOSPITAL | Age: 39
End: 2024-03-08
Attending: OBSTETRICS & GYNECOLOGY
Payer: COMMERCIAL

## 2024-03-08 ENCOUNTER — ANCILLARY PROCEDURE (OUTPATIENT)
Dept: ULTRASOUND IMAGING | Facility: HOSPITAL | Age: 39
End: 2024-03-08
Attending: OBSTETRICS & GYNECOLOGY
Payer: COMMERCIAL

## 2024-03-08 DIAGNOSIS — O35.BXX0 ANOMALY OF HEART OF FETUS AFFECTING PREGNANCY, ANTEPARTUM, SINGLE OR UNSPECIFIED FETUS: Primary | ICD-10-CM

## 2024-03-08 DIAGNOSIS — O35.BXX0 ANOMALY OF HEART OF FETUS AFFECTING PREGNANCY, ANTEPARTUM, SINGLE OR UNSPECIFIED FETUS: ICD-10-CM

## 2024-03-08 DIAGNOSIS — O09.93 SUPERVISION OF HIGH RISK PREGNANCY IN THIRD TRIMESTER: ICD-10-CM

## 2024-03-08 PROCEDURE — 99207 PR NO CHARGE LOS: CPT | Performed by: OBSTETRICS & GYNECOLOGY

## 2024-03-08 PROCEDURE — 76819 FETAL BIOPHYS PROFIL W/O NST: CPT

## 2024-03-08 PROCEDURE — 76819 FETAL BIOPHYS PROFIL W/O NST: CPT | Mod: 26 | Performed by: OBSTETRICS & GYNECOLOGY

## 2024-03-08 NOTE — NURSING NOTE
Sampson Fonseca is a  at 32w2d who presents to [facility] for a BPP.  Jose Manuel  utilized for today's appointment.  Pt reports positive fetal movement. Pt denies bldg/lof/change in discharge, contractions, headache, vision changes, chest pain/SOB or edema. SBAR given to Dr. Smith, see note in Epic.      Savannah Bob RN

## 2024-03-11 ENCOUNTER — TELEPHONE (OUTPATIENT)
Dept: FAMILY MEDICINE | Facility: CLINIC | Age: 39
End: 2024-03-11

## 2024-03-11 ENCOUNTER — OFFICE VISIT (OUTPATIENT)
Dept: FAMILY MEDICINE | Facility: CLINIC | Age: 39
End: 2024-03-11
Payer: COMMERCIAL

## 2024-03-11 VITALS
BODY MASS INDEX: 31.45 KG/M2 | HEIGHT: 59 IN | RESPIRATION RATE: 16 BRPM | HEART RATE: 119 BPM | OXYGEN SATURATION: 98 % | TEMPERATURE: 97.5 F | SYSTOLIC BLOOD PRESSURE: 109 MMHG | DIASTOLIC BLOOD PRESSURE: 70 MMHG | WEIGHT: 156 LBS

## 2024-03-11 DIAGNOSIS — K03.6 BETEL DEPOSIT ON TEETH: ICD-10-CM

## 2024-03-11 DIAGNOSIS — O09.529 SUPERVISION OF HIGH-RISK PREGNANCY OF ELDERLY MULTIGRAVIDA: Primary | ICD-10-CM

## 2024-03-11 DIAGNOSIS — O35.BXX0 ANOMALY OF HEART OF FETUS AFFECTING PREGNANCY, ANTEPARTUM, SINGLE OR UNSPECIFIED FETUS: ICD-10-CM

## 2024-03-11 DIAGNOSIS — Z72.0 TOBACCO ABUSE: ICD-10-CM

## 2024-03-11 PROCEDURE — 90715 TDAP VACCINE 7 YRS/> IM: CPT

## 2024-03-11 PROCEDURE — 90471 IMMUNIZATION ADMIN: CPT

## 2024-03-11 PROCEDURE — 99207 PR PRENATAL VISIT: CPT | Mod: GC

## 2024-03-11 RX ORDER — PRENATAL VIT/IRON FUM/FOLIC AC 27MG-0.8MG
1 TABLET ORAL DAILY
Qty: 90 TABLET | Refills: 3 | Status: CANCELLED | OUTPATIENT
Start: 2024-03-11

## 2024-03-11 NOTE — TELEPHONE ENCOUNTER
Attempted to contact pt to assist with arranging transportation for upcoming appointments. Spoke with Ramonita, representative told me that pt already has rides set up for their upcoming appointments.     [place] is unable to do next day or same day rides, they will need 2-3 days notice. Pt does not have any rides arranged for appointments tomorrow 3/12. Will call pt again later today to assist with rescheduling their appointments.    JANNIE Simental

## 2024-03-11 NOTE — Clinical Note
Ananth Ramírez, could you help Sampson get a breast pump as well as car seat? Amite that you may already be helping her arrange the car seat; I put a DME order in for breast pump today as well. In addition, could you help check if she has medical transportation set up for her MFM appointments tomorrow, Wednesday, and 3/20? Thanks!!

## 2024-03-11 NOTE — PROGRESS NOTES
Preceptor Attestation:    I discussed the patient with the resident and evaluated the patient in person. I have verified the content of the note, which accurately reflects my assessment of the patient and the plan of care.   Supervising Physician:  Lita Zhu MD

## 2024-03-11 NOTE — PATIENT INSTRUCTIONS
Nice to see you today!    Here's what we talked about:  - Desiree will call you to get the breast pump delivered to your house.  - Come back to see me on 3/28.

## 2024-03-11 NOTE — PROGRESS NOTES
Assessment & Plan  Sampson Fonseca is a 38 year old , at 32w5d weeks of pregnancy with DUNCAN of May 1, 2024 by  6 week ultrasound. Patient's history is high risk for the following reasons:  fetal tetralogy of Fallot, language barrier, AMA, tobacco and betel nut usage.  No concerns today.      Sampson was seen today for prenatal care.    Diagnoses and all orders for this visit:    Supervision of high-risk pregnancy of elderly multigravida  Patient does note more extremity swelling; trace edema noted on exam today. Otherwise asymptomatic and BP at goal.  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 Plan for echo immediately after delivery; will be consulting with CV surgery postnatally.    Tobacco abuse  Betel deposit on teeth  Continues to chew tobacco mixed with betel nut 5x/day; continues to use NRT as well.  - Continued discussions on cessation    Other orders  -     TDAP VACCINE (Adacel, Boostrix)  [[id number]]    Weight gain adequate: 9.526 kg (21 lb) to date, out of recommended total of 15-25 lbs (pregravid BMI 25-29.9)    Return to clinic in 2 weeks, appointment for 3/28 scheduled.  Follows with [facility], has scheduled appointments on 3/12 and 3/20 (weekly).      Amber Jean, MS3  I precepted today with Lita Zhu MD.    Resident/Fellow Attestation   I, Ana M Pace MD, saw the patient.  I have verified the history and personally performed a physical exam and medical decision making.  I discussed the case with the medical/MARIAMA student and agree with the assessment and plan of care as documented in the note.      Ana M Pace MD  PGY-2  [facility] Medicine Residency      Subjective  Concerns: No concerns today.  Still using her nicotine gum. Reports chew tobacco and betel nut usage about 5x/day.  Chronic headache still present but  "has not worsened.  +fetal movement.  Reviewed signs of  labor.  Plans to deliver at 39 weeks at [place] d/t high risk status.      ROS:  No - Headache  No - Changes in vision  No - Chest Pain  No - Shortness of Breath  No - Nausea   No - Vomiting  No - Abdominal pain   No - Contractions  No - Dysuria   No - Vaginal Discharge    No - Vaginal bleeding   No - Loss of Fluid   YES - Extremity swelling  Present - Fetal movement       Going to WIC? Yes      Patient Active Problem List   Diagnosis    Language barrier, cultural differences    Tobacco abuse    Betel deposit on teeth    Sebaceous cyst    Migraine    AMA (advanced maternal age) multigravida 35+    Supervision of high-risk pregnancy of elderly multigravida    Fetal cardiac anomaly affecting pregnancy, antepartum       Sampson D Mo speaks Algerian so an  was used today.    Guidance:  seatbelt use  signs of  labor    Do you need help getting a car seat? No (Desiree is working on it)  Do you need help getting a breast pump? YES - DME order cc'd to Desiree    Objective  /70   Pulse 119   Temp 97.5  F (36.4  C) (Tympanic)   Resp 16   Ht 1.499 m (4' 11\")   Wt 70.8 kg (156 lb)   LMP 2023   SpO2 98%   BMI 31.51 kg/m    No distress.  Gravid abdomen.  .  Fundal height 33 cm.  no LE edema.    Results  Blood type: B POS  No results found for any visits on 24.    "

## 2024-03-11 NOTE — PROGRESS NOTES
Prior to immunization administration, verified patients identity using patient s name and date of birth. Please see Immunization Activity for additional information.     Screening Questionnaire for Adult Immunization    Are you sick today?   No   Do you have allergies to medications, food, a vaccine component or latex?   No   Have you ever had a serious reaction after receiving a vaccination?   No   Do you have a long-term health problem with heart, lung, kidney, or metabolic disease (e.g., diabetes), asthma, a blood disorder, no spleen, complement component deficiency, a cochlear implant, or a spinal fluid leak?  Are you on long-term aspirin therapy?   No   Do you have cancer, leukemia, HIV/AIDS, or any other immune system problem?   No   Do you have a parent, brother, or sister with an immune system problem?   Yes   In the past 3 months, have you taken medications that affect  your immune system, such as prednisone, other steroids, or anticancer drugs; drugs for the treatment of rheumatoid arthritis, Crohn s disease, or psoriasis; or have you had radiation treatments?   No   Have you had a seizure, or a brain or other nervous system problem?   No   During the past year, have you received a transfusion of blood or blood    products, or been given immune (gamma) globulin or antiviral drug?   No   For women: Are you pregnant or is there a chance you could become       pregnant during the next month?   Yes   Have you received any vaccinations in the past 4 weeks?   No     Immunization questionnaire was positive for at least one answer.  Notified Dr. Pace.      Patient instructed to remain in clinic for 15 minutes afterwards, and to report any adverse reactions.     Screening performed by Anne Chatman MA on 3/11/2024 at 8:14 AM.

## 2024-03-12 NOTE — TELEPHONE ENCOUNTER
Assisted pt in rescheduling MFM appointment and arranging transportation for new date/time. We called [employer] together. Pt is aware of future appointments and transportation details    Medical Transportation Coordination    Appt Date: 3/13/24               Arrival Time: 9:30 AM    Appt Location & Address:     [address]  [facility]  [address],[facility] [address]       Total family members:   1    Tranportation Name & Phone Number:   [employer] [phone]     Estimated Pick-up Time:   8:50 AM- 9:10 AM     Roundtrip?  Yes  Patient Notified?  Yes      Appt Date: 3/14/24               Arrival Time: 11:45 AM    Appt Location & Address:   [address] [place] [id number]     Total family members:   1    Tranportation Name & Phone Number:   [employer] [phone]     Estimated Pick-up Time:   10:30 - 11:00 AM     Roundtrip?  Yes  Patient Notified?  Yes      Appt Date: 3/20/24               Arrival Time: 11:45 AM     Appt Location & Address:   [address] [place] [id number]     Total family members:   1    Tranportation Name & Phone Number:   [employer] [phone]     Estimated Pick-up Time:   10:50 AM - 11:10 AM     Roundtrip?  Yes  Patient Notified?  Yes    Appt Date: 3/27/24               Arrival Time: 1:30 PM    Appt Location & Address:   [address] [place] [id number]     Total family members:   1    Tranportation Name & Phone Number:   [employer] [phone]     Estimated Pick-up Time:   12:15 PM - 12:40 PM    Roundtrip?  Yes  Patient Notified?  Yes      Appt Date: 3/28/24               Arrival Time: 1:30 PM    Appt Location & Address:   [address], [place] [address]    Total family members:   1    Tranportation Name & Phone Number:   [employer] [phone]     Estimated Pick-up Time:   12:30 - 12:50 PM    Roundtrip?  Yes  Patient Notified?  Yes    JANNIE Simental

## 2024-03-13 ENCOUNTER — OFFICE VISIT (OUTPATIENT)
Dept: PEDIATRIC CARDIOLOGY | Facility: CLINIC | Age: 39
End: 2024-03-13
Attending: THORACIC SURGERY (CARDIOTHORACIC VASCULAR SURGERY)
Payer: COMMERCIAL

## 2024-03-13 DIAGNOSIS — O35.BXX0 ANOMALY OF HEART OF FETUS AFFECTING PREGNANCY, ANTEPARTUM, SINGLE OR UNSPECIFIED FETUS: Primary | ICD-10-CM

## 2024-03-13 PROCEDURE — 99203 OFFICE O/P NEW LOW 30 MIN: CPT | Performed by: THORACIC SURGERY (CARDIOTHORACIC VASCULAR SURGERY)

## 2024-03-13 NOTE — LETTER
3/13/2024      RE: Sampson Fonseca  [address] [place] [address]     Dear Colleague,    Thank you for the opportunity to participate in the care of your patient, Sampson Fonseca, at the [facility] at [facility]. Please see a copy of my visit note below.    I had the pleasure of seeing Sampson Fonseca   at the [facility] in [facility] in [place] on 2024   in consultation for a fetus with double outlet right ventricle with D-mal-posed great arteries with aortic arch hypoplasia and large sub-pulmonary ventricular septal defect.     The baby is anticipated to require  evaluation which  which would include transthoracic echocardiogram,  cardiac MRI, and for cardiac CT.     The child is going to require stabilization with prostaglandin infusion during the early  period.    The fetus has good sized ventricle ventricles bilaterally, with normal inflows.     The surgical options could be single-stage aortic arch repair with a complete biventricular repair vs staged repair to achieve a fully septated biventricular circulation.    Determination of the anatomical nuances such as size and location of the ventricular septal defect, great arterial relationship, and coronary anatomy would be required to determine the final plan.   All questions were answered     Plan:   planning of surgical intervention based on post  anatomy and physiology.     Time spent in this consult  10 mins -chart review  20 mins- counseling       Please do not hesitate to contact me if you have any questions/concerns.     Sincerely,       Zena Vazquez MD

## 2024-03-14 ENCOUNTER — CARE COORDINATION (OUTPATIENT)
Dept: FAMILY MEDICINE | Facility: CLINIC | Age: 39
End: 2024-03-14
Payer: COMMERCIAL

## 2024-03-14 NOTE — PROGRESS NOTES
Outgoing call placed to [employer] to determine why appointment was cancelled due to lack of transportation today. They state they did not cancel the ride but [facility] actually cancelled appointment first.     All future rides with [employer] confirmed again with them.     Outgoing call placed to [facility]. They state the patient cancelled the appointment herself due to transportation confusion. [facility] has added the OB appointment the patient missed today to her next visit on 3/20. Routed to PCP.     JASMIN Cordoba

## 2024-03-15 NOTE — PROGRESS NOTES
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     The baby is anticipated to require  evaluation which  which would include transthoracic echocardiogram,  cardiac MRI, and for cardiac CT.     The child is going to require stabilization with prostaglandin infusion during the early  period.    The fetus has good sized ventricle ventricles bilaterally, with normal inflows.     The surgical options could be single-stage aortic arch repair with a complete biventricular repair vs staged repair to achieve a fully septated biventricular circulation.    Determination of the anatomical nuances such as size and location of the ventricular septal defect, great arterial relationship, and coronary anatomy would be required to determine the final plan.   All questions were answered     Plan:   planning of surgical intervention based on post  anatomy and physiology.     Time spent in this consult  10 mins -chart review  20 mins- counseling

## 2024-03-20 ENCOUNTER — ANCILLARY PROCEDURE (OUTPATIENT)
Dept: ULTRASOUND IMAGING | Facility: HOSPITAL | Age: 39
End: 2024-03-20
Attending: OBSTETRICS & GYNECOLOGY
Payer: COMMERCIAL

## 2024-03-20 ENCOUNTER — OFFICE VISIT (OUTPATIENT)
Dept: MATERNAL FETAL MEDICINE | Facility: HOSPITAL | Age: 39
End: 2024-03-20
Attending: OBSTETRICS & GYNECOLOGY
Payer: COMMERCIAL

## 2024-03-20 DIAGNOSIS — O35.BXX0 ANOMALY OF HEART OF FETUS AFFECTING PREGNANCY, ANTEPARTUM, SINGLE OR UNSPECIFIED FETUS: Primary | ICD-10-CM

## 2024-03-20 DIAGNOSIS — O35.BXX0 ANOMALY OF HEART OF FETUS AFFECTING PREGNANCY, ANTEPARTUM, SINGLE OR UNSPECIFIED FETUS: ICD-10-CM

## 2024-03-20 DIAGNOSIS — O09.93 SUPERVISION OF HIGH RISK PREGNANCY IN THIRD TRIMESTER: ICD-10-CM

## 2024-03-20 PROCEDURE — 76819 FETAL BIOPHYS PROFIL W/O NST: CPT | Mod: 26 | Performed by: STUDENT IN AN ORGANIZED HEALTH CARE EDUCATION/TRAINING PROGRAM

## 2024-03-20 PROCEDURE — 76819 FETAL BIOPHYS PROFIL W/O NST: CPT

## 2024-03-20 PROCEDURE — 99207 PR NO CHARGE LOS: CPT | Performed by: STUDENT IN AN ORGANIZED HEALTH CARE EDUCATION/TRAINING PROGRAM

## 2024-03-20 NOTE — PROGRESS NOTES
Please see the full imaging report from the ViewPoint program under the imaging tab.    Marlee Bagley MD  Maternal Fetal Medicine

## 2024-03-20 NOTE — NURSING NOTE
Sampson Fonseca is a  at 34w0d who presents to [facility] for BPP. Pt reports positive fetal movement. Pt denies bldg/lof/change in discharge, contractions, headache, vision changes, chest pain/SOB or edema. SBAR given to Dr. NESTOR Bagley, see note in Epic.

## 2024-03-21 NOTE — PROGRESS NOTES
3/21/24 Requested car seat for pt online through [email or web address].  They will contact pt once they are ready to deliver to arrange time for delivery and instruct pt on how to use car seat (usually takes 3 weeks from the time referral received to get call for delivery).  Emailed request for breast pump to [facility] [employer]. Breast pump will be delivered to pt's home./NG

## 2024-03-27 ENCOUNTER — OFFICE VISIT (OUTPATIENT)
Dept: MATERNAL FETAL MEDICINE | Facility: CLINIC | Age: 39
End: 2024-03-27
Attending: ADVANCED PRACTICE MIDWIFE
Payer: COMMERCIAL

## 2024-03-27 ENCOUNTER — OFFICE VISIT (OUTPATIENT)
Dept: MATERNAL FETAL MEDICINE | Facility: CLINIC | Age: 39
End: 2024-03-27
Attending: OBSTETRICS & GYNECOLOGY
Payer: COMMERCIAL

## 2024-03-27 ENCOUNTER — HOSPITAL ENCOUNTER (OUTPATIENT)
Dept: ULTRASOUND IMAGING | Facility: CLINIC | Age: 39
Discharge: HOME | End: 2024-03-27
Attending: OBSTETRICS & GYNECOLOGY
Payer: COMMERCIAL

## 2024-03-27 VITALS
SYSTOLIC BLOOD PRESSURE: 120 MMHG | DIASTOLIC BLOOD PRESSURE: 79 MMHG | WEIGHT: 160 LBS | RESPIRATION RATE: 18 BRPM | OXYGEN SATURATION: 98 % | HEART RATE: 97 BPM | BODY MASS INDEX: 32.32 KG/M2

## 2024-03-27 DIAGNOSIS — O35.BXX0 ANOMALY OF HEART OF FETUS AFFECTING PREGNANCY, ANTEPARTUM, SINGLE OR UNSPECIFIED FETUS: ICD-10-CM

## 2024-03-27 DIAGNOSIS — O35.BXX0 ANOMALY OF HEART OF FETUS AFFECTING PREGNANCY, ANTEPARTUM, SINGLE OR UNSPECIFIED FETUS: Primary | ICD-10-CM

## 2024-03-27 DIAGNOSIS — O09.93 SUPERVISION OF HIGH RISK PREGNANCY IN THIRD TRIMESTER: ICD-10-CM

## 2024-03-27 DIAGNOSIS — O09.93 SUPERVISION OF HIGH RISK PREGNANCY IN THIRD TRIMESTER: Primary | ICD-10-CM

## 2024-03-27 PROCEDURE — 76819 FETAL BIOPHYS PROFIL W/O NST: CPT

## 2024-03-27 PROCEDURE — G0463 HOSPITAL OUTPT CLINIC VISIT: HCPCS | Mod: 25 | Performed by: ADVANCED PRACTICE MIDWIFE

## 2024-03-27 PROCEDURE — 99243 OFF/OP CNSLTJ NEW/EST LOW 30: CPT

## 2024-03-27 PROCEDURE — 76816 OB US FOLLOW-UP PER FETUS: CPT

## 2024-03-27 PROCEDURE — 99213 OFFICE O/P EST LOW 20 MIN: CPT | Mod: 25 | Performed by: ADVANCED PRACTICE MIDWIFE

## 2024-03-27 PROCEDURE — 76816 OB US FOLLOW-UP PER FETUS: CPT | Mod: 26 | Performed by: OBSTETRICS & GYNECOLOGY

## 2024-03-27 PROCEDURE — 76819 FETAL BIOPHYS PROFIL W/O NST: CPT | Mod: 26 | Performed by: OBSTETRICS & GYNECOLOGY

## 2024-03-27 PROCEDURE — G0463 HOSPITAL OUTPT CLINIC VISIT: HCPCS | Mod: 25,27

## 2024-03-27 ASSESSMENT — PAIN SCALES - GENERAL: PAINLEVEL: NO PAIN (0)

## 2024-03-27 NOTE — NURSING NOTE
Pt seen in clinic today for RL2/BPP, ob visit, and NICU consult at 35w0d gestation for pregnancy complicated by fetal CHD (see report/notes). Jose Manuel  utilized for this visit via Ipad. VSS. Pt reports normal fetal movement, see flowsheet. Evaluated for bldg/lof/change in discharge/contractions/headache/vision changes/chest pain/SOB/edema, patient denies concerns. Pt evaluated for cardiac concerns,  labor, preeclampsia, fetal wellbeing without concerns. Patient reports no concerns. Pt notified to review new pt education in AVS, verbally reviewed highlights. Phone numbers to clinic and [facility] given and discussed. Plan weekly BPP/OB visits. Pt discharged stable and ambulatory.      Dana Meadows RN

## 2024-03-27 NOTE — PROGRESS NOTES
"Maternal fetal Medicine OB Follow up visit.     Sampson Fonseca  : 1985  MRN: [id number]    CC: OB Follow-up conducted with iPad     Subjective:  Sampson Fonseca is a 38 year old  at 35w0d presenting for routine OB follow-up. Today, she is feeling well. Had some mild cramping on an off last night for about 2-3 hours, but resolved on it's own. Denies loss of fluid or vaginal bleeding. Reports fetal movement. Has no concerns at this time.    OB Hx:  OB History    Para Term  AB Living   5 4 4 0 0 4   SAB IAB Ectopic Multiple Live Births   0 0 0 0 4      # Outcome Date GA Lbr Aba/2nd Weight Sex Delivery Anes PTL Lv   5 Current            4 Term 17 38w5d  3.374 kg (7 lb 7 oz) F Vag-Spont None N MARIANNE      Birth Comments: none      Name: Inocencia Woodsu      Apgar1: 8  Apgar5: 9   3 Term 10/03/10 40w0d  3 kg (6 lb 9.8 oz) F  None N MARIANNE      Birth Comments: jaundice was under the bililights for 12 days, umbilical cord infection \"Ei Phyo Lawrence\"      Name: Ei Phyo Lawrence   2 Term 09 40w0d  2.8 kg (6 lb 2.8 oz) M Vag-Spont None N MARIANNE      Birth Comments: none, \" Manuel Nyi Bwar\"      Name: Manuel Jin Bwar   1 Term 05 40w0d  3.2 kg (7 lb 0.9 oz) M Vag-Spont None N MARIANNE      Birth Comments: none- \" Manuel Ne Oo\"      Name: Manuelsirena Manrique Oo         Objective:  /79 (BP Location: Left arm, Patient Position: Chair)   Pulse 97   Resp 18   Wt 72.6 kg (160 lb)   LMP 2023   SpO2 98%   BMI 32.32 kg/m    Gen: alert, oriented, NAD  Skin: warm, dry, intact  Respiratory: breathing unlabored, no SOB  Abdominal: gravid, non-tender  Pelvic: deferred  Extremities: WNL  Psych: mood WNL, behavior WNL      OB Ultrasound:  Please see \"imaging\" tab under chart review for today's ultrasound results.    Fetal Echo:  24  Double outlet right ventricle with D-mal-posed great arteries with aortic arch  hypoplasia and large sub-pulmonary ventricular septal defect. The pulmonary  valve and main pulmonary artery are " dilated. The aortic valve is mildly  hypoplastic; measures about 0.45 cm with a z-score of -2.34. Moderate to  severe hypoplasia of the transverse arch and severe hypoplasia of the aortic  isthmus with antegrade flow. The coarctation is juxtaductal. Normal right and  left ventricular size and systolic function. No pericardial effusion.  Please refer to consultation note for details. Prostaglandin therapy should be  commenced if there are signs of coarctation, such as poor perfusion, decreased  femoral pulses or a significant upper to lower limb blood. pressure gradient.  Delivery is recommended at [facility]. Cardiology consultation and  echocardiogram is recommended immediately after delivery.      Assessment/Plan:  38 year old  at 35w0d here for follow up OB visit.    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        Transfer of care:  - Oriented patient to [facility] practice. Reviewed that [facility] is a multidisciplinary institution and her care will be collaborative in fashion with [facility] doctors, CNM, residents, fellows, and [facility] clinic for intrapartum management.      Fetal CHD:  - See fetal echo above. Anticipated to require  evaluation which would include transthoracic echocardiogram, cardiac MRI, and for cardiac CT. Will also require PGE after birth.  - CV surgery consult on 3/13.   - NICU consult after OB visit today.      Routine PNC:  - Prenatal labs:  Rh: +  antibody: neg               HepB/HIV/RPR/HepC: nonreactive               GC/CT: neg               Rubella: immune                 GCT: 153  GTT: passed               UC: <10k mixed urogenital filiberto  - Pap smear: due - recommend collection PP  - Immunizations:  s/p Flu and Tdap  - Genetic screening: low-risk NIPT. Declines invasive testing  - GBS at 36 weeks        Surveillance:  - Serial growth US.   - Weekly BPPs starting at 32 weeks     Delivery planning:  - Discussed likely 39 week delivery via IOL if spontaneous labor does not occur.   - Labor analgesia: has not used any pain medication in the past, but will review options at upcoming visit  - Feeding: breastfeeding and formula  - Contraception plans: has used Nexplanon in the past and may return to that method.    RTC in 1 week    15 minutes spent on the date of the encounter, doing chart review, history and exam, documentation and further activities as noted.      Elvie Bonilla CNM on 3/27/2024 at 2:04 PM

## 2024-03-28 ENCOUNTER — TELEPHONE (OUTPATIENT)
Dept: FAMILY MEDICINE | Facility: CLINIC | Age: 39
End: 2024-03-28

## 2024-03-28 ENCOUNTER — OFFICE VISIT (OUTPATIENT)
Dept: FAMILY MEDICINE | Facility: CLINIC | Age: 39
End: 2024-03-28
Payer: COMMERCIAL

## 2024-03-28 VITALS
HEART RATE: 103 BPM | OXYGEN SATURATION: 98 % | DIASTOLIC BLOOD PRESSURE: 72 MMHG | TEMPERATURE: 97.8 F | RESPIRATION RATE: 20 BRPM | WEIGHT: 161.2 LBS | BODY MASS INDEX: 32.5 KG/M2 | HEIGHT: 59 IN | SYSTOLIC BLOOD PRESSURE: 107 MMHG

## 2024-03-28 DIAGNOSIS — Z72.0 TOBACCO ABUSE: ICD-10-CM

## 2024-03-28 DIAGNOSIS — O09.529 SUPERVISION OF HIGH-RISK PREGNANCY OF ELDERLY MULTIGRAVIDA: Primary | ICD-10-CM

## 2024-03-28 DIAGNOSIS — K03.6 BETEL DEPOSIT ON TEETH: ICD-10-CM

## 2024-03-28 DIAGNOSIS — O35.BXX0 ANOMALY OF HEART OF FETUS AFFECTING PREGNANCY, ANTEPARTUM, SINGLE OR UNSPECIFIED FETUS: ICD-10-CM

## 2024-03-28 PROCEDURE — 59426 ANTEPARTUM CARE ONLY: CPT | Mod: GC

## 2024-03-28 NOTE — PROGRESS NOTES
"Preceptor Attestation:  Vitals:    03/28/24 1343   BP: 107/72   Pulse: 103   Resp: 20   Temp: 97.8  F (36.6  C)   TempSrc: Oral   SpO2: 98%   Weight: 73.1 kg (161 lb 3.2 oz)   Height: 1.504 m (4' 11.2\")          I discussed the patient with the resident and evaluated the patient in person. I have verified the content of the note, which accurately reflects my assessment of the patient and the plan of care.   Supervising Physician:  Leonela Motta MD    "

## 2024-03-28 NOTE — Clinical Note
Ananth Ramírez,  I spoke with Sampson and she would prefer to continue care with just MFM leading up to delivery - they have already scheduled her for weekly visits. But she'd like to continue postpartum care with us, so I scheduled her 2 week PP visit already.  Is there a way to get Sanjiv Bravo to be present in person at her delivery? Sanjiv Bravo says he's done that for other deliveries before and you can talk to his agency? Also, is there a way for me to be present at her delivery as well?   Ana M

## 2024-03-28 NOTE — PATIENT INSTRUCTIONS
Nice to see you today!    Here's what we talked about:  - You can stop using the nicotine gum.  - If you start having any symptoms of high blood pressure (worse headache, change in vision, etc.), go to the [facility].  - I will try to see if Sanjiv Bravo can come to your delivery, and if I can come too.  - Call Sanjiv Bravo if you don't get your car seat or breast pump.  - Come back in early May (we'll help schedule this) for your 2 week postpartum visit.

## 2024-03-28 NOTE — TELEPHONE ENCOUNTER
Medical Transportation Coordination    Appt Date: 4/3/24               Arrival Time: 11:00 AM    Appt Location & Address:   Maternal Fetal Medicine [facility]    Total family members:   1    Tranportation Name & Phone Number:   [employer] [phone]     Estimated Pick-up Time:   10:00-10:30 AM    Roundtrip?  Yes  Patient Notified?  Yes      Medical Transportation Coordination    Appt Date: 4/10/24               Arrival Time: 2:15 PM    Appt Location & Address:   [facility]etal Medicine [facility]    Total family members:   1    Tranportation Name & Phone Number:   [employer] [phone]     Estimated Pick-up Time:   1:15-1:45 PM    Roundtrip?  Yes  Patient Notified?  Yes      Medical Transportation Coordination    Appt Date: 4/10/24               Arrival Time: 2:15 PM    Appt Location & Address:   [facility]etal Medicine [facility]    Total family members:   1    Tranportation Name & Phone Number:   [employer] [phone]     Estimated Pick-up Time:   1:15-1:45 PM    Roundtrip?  Yes  Patient Notified?  Yes

## 2024-03-28 NOTE — TELEPHONE ENCOUNTER
Called and spoke with Care Coordinator at [facility].  They will notify Dr Pace when pt has been scheduled for IOL at around 39 wks (~4/24/24) and she can be present in the room during delivery.      Care Coordinator is going to check again to see if she can get the okay for Sanjiv Bravo to interpret for pt at time of delivery.  Routed note to Dr Pace./Desiree Mccollum RN BSN

## 2024-03-28 NOTE — PROGRESS NOTES
Assessment & Plan  Sampson Fonseca is a 38 year old , at 35w1d weeks of pregnancy with DUNCAN of May 1, 2024 by  6 week ultrasound. Patient's history is high risk for the following reasons: fetal tetralogy of Fallot, language barrier, AMA, tobacco and betel nut usage.     Sampson was seen today for prenatal care.    Diagnoses and all orders for this visit:    Supervision of high-risk pregnancy of elderly multigravida  Will be transitioning care over to [facility] for remainder of pregnancy.  GBS swab will be done next week.  No symptoms of PIH.  Will attempt to see if  Sanjiv Bravo can be present in person for her delivery, planning induction at 39 weeks.  Will hopefully have breast pump and car seat delivered to her home this weekend.    Anomaly of heart of fetus affecting pregnancy, antepartum, single or unspecified fetus  Following with [facility] and Pediatric Cardiology for regular visits and BPPs; co-managing care with them. Plan for echo immediately after delivery; will be consulting with CV surgery postnatally - see their notes for details.    Tobacco abuse  Betel deposit on teeth  Has not felt that nicotine gum has been helpful and cutting down on chewing tobacco/betel nut usage.  Will discontinue that today and patient would prefer to address again postpartum; declines other forms of NRT.      Weight gain adequate: 11.9 kg (26 lb 3.2 oz) to date, out of recommended total of 15-25 lbs (pregravid BMI 25-29.9)    Transitioning care to [facility] for remainder of pregnancy.  Will schedule two week postpartum visit with us as patient plans to continue care at our clinic postpartum as well as Nexplanon placement.    Ana M Pace MD  I precepted today with Leonela Motta MD.      Subjective  Concerns: no concerns today. Has not changed in amount of tobacco/betel nut she is chewing. Feels the nicotine gum is not helping her cut down. Does not want to try patch or lozenge. Had some low back pain a couple days ago that went away  "on its own.    ROS:  YES - Headache - no change in tension headache  No - Changes in vision  No - Chest Pain  No - Shortness of Breath  No - Nausea   No - Vomiting  No - Abdominal pain   No - Contractions  No - Dysuria   No - Vaginal Discharge    No - Vaginal bleeding   No - Loss of Fluid   YES - Extremity swelling - has had this in the past, slight; no change  Present - Fetal movement       Going to [facility]IC? Yes    Patient Active Problem List   Diagnosis    Language barrier, cultural differences    Tobacco abuse    Betel deposit on teeth    Sebaceous cyst    Migraine    AMA (advanced maternal age) multigravida 35+    Supervision of high-risk pregnancy of elderly multigravida    Fetal cardiac anomaly affecting pregnancy, antepartum       Sampson D Mo speaks Somali so an  was used today.    Guidance:  birth control - Nexplanon  Travel - no plans for traveling  warning signs/PIH - reviewed    Do you need help getting a car seat? No - states they will be delivered this weekend  Do you need help getting a breast pump? No    Objective  /72   Pulse 103   Temp 97.8  F (36.6  C) (Oral)   Resp 20   Ht 1.504 m (4' 11.2\")   Wt 73.1 kg (161 lb 3.2 oz)   LMP 06/13/2023   SpO2 98%   BMI 32.34 kg/m    No distress.  Gravid abdomen.  .  Fundal height 36 cm.  no edema.    Results  Blood type: B POS  No results found for any visits on 03/28/24.    "

## 2024-04-03 ENCOUNTER — OFFICE VISIT (OUTPATIENT)
Dept: MATERNAL FETAL MEDICINE | Facility: CLINIC | Age: 39
End: 2024-04-03
Attending: ADVANCED PRACTICE MIDWIFE
Payer: COMMERCIAL

## 2024-04-03 ENCOUNTER — HOSPITAL ENCOUNTER (OUTPATIENT)
Dept: ULTRASOUND IMAGING | Facility: CLINIC | Age: 39
Discharge: HOME | End: 2024-04-03
Attending: OBSTETRICS & GYNECOLOGY
Payer: COMMERCIAL

## 2024-04-03 VITALS
SYSTOLIC BLOOD PRESSURE: 119 MMHG | RESPIRATION RATE: 18 BRPM | BODY MASS INDEX: 32.12 KG/M2 | DIASTOLIC BLOOD PRESSURE: 74 MMHG | HEART RATE: 119 BPM | OXYGEN SATURATION: 99 % | WEIGHT: 160.1 LBS | TEMPERATURE: 98.1 F

## 2024-04-03 DIAGNOSIS — O35.BXX0 ANOMALY OF HEART OF FETUS AFFECTING PREGNANCY, ANTEPARTUM, SINGLE OR UNSPECIFIED FETUS: ICD-10-CM

## 2024-04-03 DIAGNOSIS — O09.93 SUPERVISION OF HIGH RISK PREGNANCY IN THIRD TRIMESTER: Primary | ICD-10-CM

## 2024-04-03 PROCEDURE — 76819 FETAL BIOPHYS PROFIL W/O NST: CPT

## 2024-04-03 PROCEDURE — 76819 FETAL BIOPHYS PROFIL W/O NST: CPT | Mod: 26 | Performed by: OBSTETRICS & GYNECOLOGY

## 2024-04-03 PROCEDURE — 87653 STREP B DNA AMP PROBE: CPT | Performed by: OBSTETRICS & GYNECOLOGY

## 2024-04-03 PROCEDURE — 99214 OFFICE O/P EST MOD 30 MIN: CPT | Mod: 25 | Performed by: OBSTETRICS & GYNECOLOGY

## 2024-04-03 PROCEDURE — G0463 HOSPITAL OUTPT CLINIC VISIT: HCPCS | Mod: 25 | Performed by: OBSTETRICS & GYNECOLOGY

## 2024-04-03 NOTE — NURSING NOTE
"Sampson seen in clinic today for OB visit at 36w0d gestation. VS WNL x HR of 119. Temp 98.1f. Pt HR previous greater than 100 at multiple appts. Will continue to monitor. Pt reports positive fetal movement, see flowsheet. Pt evaluated for  labor, preeclampsia, vaginal bleeding, infection, fetal wellbeing without concerns. Pt denies bleeding/lof/change in vaginal discharge/contractions/headache/vision changes/chest pain/SOB/edema. Pt states she had headaches 2-3 times a week \"sometimes all day and sometimes only for 2-3 hours\" but that this is baseline from prior to pregnancy and has hx of migraines. Pt has some mild edema in BLE, but  says it improves when she sleeps. Dr. Garza met with pt and discussed POC. Plan for induction on 24 @ 0730. Future visits already scheduled and transportation already arranged. Pt discharged stable and ambulatory.     "

## 2024-04-03 NOTE — PROGRESS NOTES
"Maternal fetal Medicine OB Follow up visit.     Sampson Fonseca  : 1985  MRN: [id number]    CC: OB Follow-up conducted with iPad     Subjective:  Sampson Fonseca is a 38 year old  at 36w0d presenting for routine OB follow-up. Today, she is feeling well. Denies any contractions, loss of fluid or vaginal bleeding. Reports normal fetal movement. Has no concerns at this time.    OB Hx:  OB History    Para Term  AB Living   5 4 4 0 0 4   SAB IAB Ectopic Multiple Live Births   0 0 0 0 4      # Outcome Date GA Lbr Aba/2nd Weight Sex Delivery Anes PTL Lv   5 Current            4 Term 17 38w5d  3.374 kg (7 lb 7 oz) F Vag-Spont None N MARIANNE      Birth Comments: none      Name: Inocencia Wilson      Apgar1: 8  Apgar5: 9   3 Term 10/03/10 40w0d  3 kg (6 lb 9.8 oz) F  None N MARIANNE      Birth Comments: jaundice was under the bililights for 12 days, umbilical cord infection \"Ei Phyo Lawrence\"      Name: Ei Phyo Lawrence   2 Term 09 40w0d  2.8 kg (6 lb 2.8 oz) M Vag-Spont None N MARIANNE      Birth Comments: none, \" Manuel Jin Bwar\"      Name: Manuel Jin Bwar   1 Term 05 40w0d  3.2 kg (7 lb 0.9 oz) M Vag-Spont None N MARIANNE      Birth Comments: none- \" Manuel Ne Oo\"      Name: Manuel Guillory     Objective:  /74 (BP Location: Left arm, Patient Position: Sitting, Cuff Size: Adult Regular)   Pulse 119   Temp 98.1  F (36.7  C)   Resp 18   Wt 72.6 kg (160 lb 1.6 oz)   LMP 2023   SpO2 99%   BMI 32.12 kg/m    Gen: alert, oriented, NAD  Pelvic: 150/-2 vtx, midposition  GBS collected  Extremities: WNL  Psych: mood WNL, behavior WNL    OB Ultrasound:  Please see \"imaging\" tab under chart review for today's ultrasound results.    Fetal Echo:  24  Double outlet right ventricle with D-mal-posed great arteries with aortic arch  hypoplasia and large sub-pulmonary ventricular septal defect. The pulmonary  valve and main pulmonary artery are dilated. The aortic valve is mildly  hypoplastic; measures about 0.45 cm with " a z-score of -2.34. Moderate to  severe hypoplasia of the transverse arch and severe hypoplasia of the aortic  isthmus with antegrade flow. The coarctation is juxtaductal. Normal right and  left ventricular size and systolic function. No pericardial effusion.  Please refer to consultation note for details. Prostaglandin therapy should be  commenced if there are signs of coarctation, such as poor perfusion, decreased  femoral pulses or a significant upper to lower limb blood. pressure gradient.  Delivery is recommended at [facility]. Cardiology consultation and  echocardiogram is recommended immediately after delivery.      Assessment/Plan:  38 year old  at 36w0d here for follow up OB visit.    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     Fetal CHD:  - Anticipated to require  evaluation which would include transthoracic echocardiogram, cardiac MRI, and for cardiac CT. Will also require PGE after birth.  - CV surgery consult on 3/13.   - s/p NICU/SW consult 3/27     Routine PNC:  - Prenatal labs:  Rh: +  antibody: neg               HepB/HIV/RPR/HepC: nonreactive               GC/CT: neg               Rubella: immune                 GCT: 153  GTT: passed               UC: <10k mixed urogenital filiberto   GBS: Collected today  - Pap smear: due - recommend collection PP  - Immunizations:  s/p Flu and Tdap  - Genetic screening: low-risk NIPT. Declines invasive testing      Surveillance:  - Serial growth US.   - Weekly BPPs until delivery     Delivery planning:  - Plan 39 week delivery via IOL if spontaneous labor does not occur prior to that time.   - Labor analgesia: has not used any pain medication in the past, but will review options at upcoming visit  - Feeding: breastfeeding and formula  - Contraception plans: has used Nexplanon in the  past and may return to that method.    RTC in 1 week    30 minutes spent on the date of the encounter, doing chart review, history and exam, documentation and further activities as noted.      Hien Garza MD

## 2024-04-04 LAB — GP B STREP DNA SPEC QL NAA+PROBE: NEGATIVE

## 2024-04-08 ENCOUNTER — CARE COORDINATION (OUTPATIENT)
Dept: FAMILY MEDICINE | Facility: CLINIC | Age: 39
End: 2024-04-08
Payer: COMMERCIAL

## 2024-04-08 NOTE — PROGRESS NOTES
Medical Transportation Coordination    Appt Date: 5/6/24               Arrival Time: 1:10 PM    Appt Location & Address:   [facility]    Total family members:   1    Tranportation Name & Phone Number:   [employer] [phone]     Estimated Pick-up Time:   12:10-12:40 PM    Roundtrip?  Yes  Patient Notified?  Yes

## 2024-04-10 ENCOUNTER — OFFICE VISIT (OUTPATIENT)
Dept: MATERNAL FETAL MEDICINE | Facility: CLINIC | Age: 39
End: 2024-04-10
Attending: ADVANCED PRACTICE MIDWIFE
Payer: COMMERCIAL

## 2024-04-10 ENCOUNTER — HOSPITAL ENCOUNTER (OUTPATIENT)
Dept: ULTRASOUND IMAGING | Facility: CLINIC | Age: 39
Discharge: HOME | End: 2024-04-10
Attending: ADVANCED PRACTICE MIDWIFE
Payer: COMMERCIAL

## 2024-04-10 VITALS
HEART RATE: 114 BPM | DIASTOLIC BLOOD PRESSURE: 86 MMHG | BODY MASS INDEX: 33.28 KG/M2 | WEIGHT: 165.9 LBS | OXYGEN SATURATION: 100 % | SYSTOLIC BLOOD PRESSURE: 135 MMHG | RESPIRATION RATE: 18 BRPM

## 2024-04-10 DIAGNOSIS — O35.BXX0 ANOMALY OF HEART OF FETUS AFFECTING PREGNANCY, ANTEPARTUM, SINGLE OR UNSPECIFIED FETUS: ICD-10-CM

## 2024-04-10 DIAGNOSIS — O35.BXX0 ANOMALY OF HEART OF FETUS AFFECTING PREGNANCY, ANTEPARTUM, SINGLE OR UNSPECIFIED FETUS: Primary | ICD-10-CM

## 2024-04-10 DIAGNOSIS — O09.93 SUPERVISION OF HIGH RISK PREGNANCY IN THIRD TRIMESTER: Primary | ICD-10-CM

## 2024-04-10 PROCEDURE — G0463 HOSPITAL OUTPT CLINIC VISIT: HCPCS | Mod: 25 | Performed by: ADVANCED PRACTICE MIDWIFE

## 2024-04-10 PROCEDURE — 76819 FETAL BIOPHYS PROFIL W/O NST: CPT

## 2024-04-10 PROCEDURE — 99213 OFFICE O/P EST LOW 20 MIN: CPT | Mod: 25 | Performed by: ADVANCED PRACTICE MIDWIFE

## 2024-04-10 PROCEDURE — 76819 FETAL BIOPHYS PROFIL W/O NST: CPT | Mod: 26 | Performed by: OBSTETRICS & GYNECOLOGY

## 2024-04-10 ASSESSMENT — PAIN SCALES - GENERAL: PAINLEVEL: NO PAIN (0)

## 2024-04-10 NOTE — PROGRESS NOTES
Mo seen in clinic today for BPP and prenatal visit at 37w0d gestation for pregnancy complicated by fetal CHD (see report/notes). VSS. Pt reports normal fetal movement, see flowsheet. Evaluated for bldg/lof/change in discharge/contractions/headache/vision changes/chest pain/SOB/edema, patient denies concerns. Pt evaluated for cardiac concerns,  labor, preeclampsia, fetal wellbeing without concerns. Phone numbers to clinic and Birthplace given and discussed. Plan to continue weekly BPP/OB visits with IOL . Pt discharged stable and ambulatory.

## 2024-04-10 NOTE — PROGRESS NOTES
"Maternal fetal Medicine OB Follow up visit.     Sampson Fonseca  : 1985  MRN: [id number]    CC: OB Follow-up conducted with iPad     Subjective:  Sampson Fonseca is a 38 year old  at 37w0d presenting for routine OB follow-up. Today, she is feeling well. Offers no concerns at this time. Denies regular, painful contractions, denies loss of fluid or vaginal bleeding. Reports fetal movement. She does not have any questions about her upcoming induction, but she hopes to avoid a c/s.     OB Hx:  OB History    Para Term  AB Living   5 4 4 0 0 4   SAB IAB Ectopic Multiple Live Births   0 0 0 0 4      # Outcome Date GA Lbr Aba/2nd Weight Sex Type Anes PTL Lv   5 Current            4 Term 17 38w5d  3.374 kg (7 lb 7 oz) F Vag-Spont None N MARIANNE      Birth Comments: none      Name: Inocencia Woodsu      Apgar1: 8  Apgar5: 9   3 Term 10/03/10 40w0d  3 kg (6 lb 9.8 oz) F  None N MARIANNE      Birth Comments: jaundice was under the bililights for 12 days, umbilical cord infection \"Ei Phyo Lawrence\"      Name: Ei Phyo Lawrence   2 Term 09 40w0d  2.8 kg (6 lb 2.8 oz) M Vag-Spont None N MARIANNE      Birth Comments: none, \" Manuel Jin Bwar\"      Name: Manuel Jin Bwar   1 Term 05 40w0d  3.2 kg (7 lb 0.9 oz) M Vag-Spont None N MARIANNE      Birth Comments: none- \" Manuel Ne Oo\"      Name: Manuel Manrique Oo         Objective:  /86 (BP Location: Left arm, Patient Position: Chair)   Pulse 114   Resp 18   Wt 75.3 kg (165 lb 14.4 oz)   LMP 2023   SpO2 100%   BMI 33.28 kg/m      Gen: alert, oriented, NAD  Skin: warm, dry, intact  Respiratory: breathing unlabored, no SOB  Abdominal: gravid, non-tender  Pelvic: deferred  Extremities: WNL  Psych: mood WNL, behavior WNL      OB Ultrasound:  Please see \"imaging\" tab under chart review for today's ultrasound results.      Assessment/Plan:  38 year old  at 37w0d here for follow up OB visit.    Pregnancy has been complicated by:   Maternal dx:  - AMA  -  x4  - Betel nut " Pt presented to [facility] ED with Gluc 626, HCO3 16, AG 16, BHB 4.2, ketonuria. 2L IVF given in ED with insulin gtt started. Glucose trended downward to 274. Pt was then admitted to hospital medicine for further management of DKA.    PLAN  - Endocrine consulted, appreciate recs   - NPO, SQ trial 11/28 not successful   - CLD; IVF boluses PRN   - Transitional Insulin gtt   - Glucose remains uncontrolled (300s today) 2/2 active infection  - BMP q6  - Will replace K PRN  - Accuchecks q1 while on insulin gtt  - Nausea: zofran PRN    use     Fetal dx:  - DORV with D-malposed great arteries with aortic arch hypoplasia and large sub-pulmonary VSD.         Fetal CHD: (not addressed during visit today 4/10)  - Anticipated to require  evaluation which would include transthoracic echocardiogram, cardiac MRI, and for cardiac CT. Will also require PGE after birth.  - CV surgery consult on 3/13.   - s/p NICU/SW consult 3/27     Routine PNC:  - Prenatal labs:  Rh: +  antibody: neg               HepB/HIV/RPR/HepC: nonreactive               GC/CT: neg               Rubella: immune                 GCT: 153  GTT: passed               UC: <10k mixed urogenital filiberto               GBS: negative  - Pap smear: due - recommend collection PP  - Immunizations:  s/p Flu and Tdap  - Genetic screening: low-risk NIPT. Declines invasive testing      Surveillance:  - Serial growth US.   - Weekly BPPs until delivery     Delivery planning:  - IOL at 39 weeks scheduled should spontaneous labor not occur. Planned for .  - Labor analgesia: has not used any pain medication in the past, but will review options at upcoming visit  - Feeding: breastfeeding and formula  - Contraception plans: has used Nexplanon in the past and may return to that method.  - Postpartum visit(s) can occur with MFM or primary OB provider     RTC in 1 week    15 minutes spent on the date of the encounter, doing chart review, history and exam, documentation and further activities as noted.      Elvie Bonilla CNM on 4/10/2024 at 2:35 PM

## 2024-04-14 NOTE — PROGRESS NOTES
Dear Colleagues:     I had the pleasure of meeting Sampson Fonseca  in the MFM Clinic at the [facility] at the request of Dr. Marlee Bagley of the Maternal Fetal Medicine service on 3/27/2024. A professional Jose Manuel interpreted via phone was utilized for this visit. Maternal Child Health  Pastora Correia was also present at the visit.    She is pregnant at approximately 35 weeks' gestation and her fetus is known to have likely double outlet right ventricle and possible coarctation of the aorta. Induction is planned around 39 weeks. No other abnormalities have been reported on ultrasound. No genetic evaluation has been pursued at this point, though there are plans to likely do so after birth. The patient was previously met with Pediatric Cardiology and Pediatric Cardiovascular surgery, and dependong on exact anatomy and infant's physiologic transition plans will likely be for a full dORV repair versus PDA stent and PDA bands in the  period.     I counseled Ms. Fonseca as to the expected care for her baby girl when she is born. There will be a  resuscitation team present at the delivery, following some time to be with the family,  she will subsequently be admitted to the  intensive care unit. At that point we will be monitoring her oxygen levels, respiratory status and cardiovascular exam closely, and a pediatric cardiology consult will be obtained and a pediatric echocardiogram done after admission. Her hospital course and stay will completely depend upon the diagnosis made when the ultrasound is done and her clinical course. Umbilical venous (for nutrition and other medications) and arterial (for blood pressure and lab monitoring) may be indicated. The ultrasound findings will be reviewed by pediatric cardiology, along with the pediatric cardiovascular surgeons, and a decision made regarding the next steps potentially for therapy. While in the NICU, we will closely  monitor her respiratory status and if no immediate plans are needed for any intervention for his heart disease, we will plan to start to feed her as able, and provide normal  baby cares. Ms. Fonseca plans to provide breast milk for her infant.    I discussed the overall makeup of the medical team and healthcare providers in the  Intensive Care Unit.  I described the typical structure of rounds with and invited her to be present during rounds and assured them that there will always be someone available to answer any questions.  I discussed, too, other aspects of the NICU including welcoming policies.      I answered questions to the best of my ability. Please be in touch if I can assist with any further questions.     Thank you very much for the opportunity to meet this delightful woman, and we look forward to caring for her baby in the NICU at the [facility].    The total time for this clinic visit was 30 minutes with 100% of time spent in consultation.     Sincerely,  Janel Newberry MD  Attending Neonatologist  [facility] NICU

## 2024-04-15 ENCOUNTER — OFFICE VISIT (OUTPATIENT)
Dept: MATERNAL FETAL MEDICINE | Facility: CLINIC | Age: 39
End: 2024-04-15
Attending: ADVANCED PRACTICE MIDWIFE
Payer: COMMERCIAL

## 2024-04-15 ENCOUNTER — HOSPITAL ENCOUNTER (OUTPATIENT)
Dept: ULTRASOUND IMAGING | Facility: CLINIC | Age: 39
Discharge: HOME | End: 2024-04-15
Attending: OBSTETRICS & GYNECOLOGY
Payer: COMMERCIAL

## 2024-04-15 ENCOUNTER — OFFICE VISIT (OUTPATIENT)
Dept: MATERNAL FETAL MEDICINE | Facility: CLINIC | Age: 39
End: 2024-04-15
Attending: OBSTETRICS & GYNECOLOGY
Payer: COMMERCIAL

## 2024-04-15 VITALS
BODY MASS INDEX: 32.7 KG/M2 | OXYGEN SATURATION: 99 % | WEIGHT: 163 LBS | RESPIRATION RATE: 18 BRPM | DIASTOLIC BLOOD PRESSURE: 80 MMHG | HEART RATE: 114 BPM | SYSTOLIC BLOOD PRESSURE: 118 MMHG

## 2024-04-15 DIAGNOSIS — O09.93 SUPERVISION OF HIGH RISK PREGNANCY IN THIRD TRIMESTER: Primary | ICD-10-CM

## 2024-04-15 DIAGNOSIS — O35.BXX0 ANOMALY OF HEART OF FETUS AFFECTING PREGNANCY, ANTEPARTUM, SINGLE OR UNSPECIFIED FETUS: Primary | ICD-10-CM

## 2024-04-15 DIAGNOSIS — O35.BXX0 ANOMALY OF HEART OF FETUS AFFECTING PREGNANCY, ANTEPARTUM, SINGLE OR UNSPECIFIED FETUS: ICD-10-CM

## 2024-04-15 PROCEDURE — 76819 FETAL BIOPHYS PROFIL W/O NST: CPT | Mod: 26 | Performed by: OBSTETRICS & GYNECOLOGY

## 2024-04-15 PROCEDURE — 76819 FETAL BIOPHYS PROFIL W/O NST: CPT

## 2024-04-15 PROCEDURE — 99213 OFFICE O/P EST LOW 20 MIN: CPT | Mod: 25 | Performed by: ADVANCED PRACTICE MIDWIFE

## 2024-04-15 ASSESSMENT — PAIN SCALES - GENERAL: PAINLEVEL: NO PAIN (0)

## 2024-04-15 NOTE — NURSING NOTE
Sampson seen in clinic today for BPP and OB visit at 37w5d gestation for pregnancy complicated by fetal CHD (see report/notes). VSS. Pt reports normal fetal movement, see flowsheet. Evaluated for bldg/lof/change in discharge/contractions/headache/vision changes/chest pain/SOB/edema, patient denies concerns. Pt evaluated for cardiac concerns,  labor, preeclampsia, fetal wellbeing without concerns. Reviewed IOL instructions. Phone numbers to clinic and Birthplace given and discussed. Jose Manuel  utilized for entire visit via Ipad. Pt discharged stable and ambulatory.      Dana Meadows RN

## 2024-04-15 NOTE — PROGRESS NOTES
"Maternal fetal Medicine OB Follow up visit.     Sampson Fonseca  : 1985  MRN: [id number]    CC: OB Follow-up    Subjective:  Sampson Fonseca is a 38 year old  at 37w5d presenting for routine OB follow-up. Today, she is feeling well. She has no concerns. Denies contractions, LOF, or VB. Reports fetal movement. She has no outstanding questions surrounding her induction at this time.     OB Hx:  OB History    Para Term  AB Living   5 4 4 0 0 4   SAB IAB Ectopic Multiple Live Births   0 0 0 0 4      # Outcome Date GA Lbr Aba/2nd Weight Sex Type Anes PTL Lv   5 Current            4 Term 17 38w5d  3.374 kg (7 lb 7 oz) F Vag-Spont None N MARIANNE      Birth Comments: none      Name: Inocencia Wilson      Apgar1: 8  Apgar5: 9   3 Term 10/03/10 40w0d  3 kg (6 lb 9.8 oz) F  None N MARIANNE      Birth Comments: jaundice was under the bililights for 12 days, umbilical cord infection \"Ei Phyo Lawrence\"      Name: Ei Phyo Lawrence   2 Term 09 40w0d  2.8 kg (6 lb 2.8 oz) M Vag-Spont None N MARIANNE      Birth Comments: none, \" Manuel Davin Bwar\"      Name: Manuel Akhtarar   1 Term 05 40w0d  3.2 kg (7 lb 0.9 oz) M Vag-Spont None N MARIANNE      Birth Comments: none- \" Manuel Ne Oo\"      Name: Manuel SKYo         Objective:  VS: see flowsheet  Gen: alert, oriented, NAD  Skin: warm, dry, intact  Respiratory: breathing unlabored, no SOB  Abdominal: gravid, non-tender  Pelvic: deferred  Extremities: WNL  Psych: mood WNL, behavior WNL      OB Ultrasound:  Please see \"imaging\" tab under chart review for today's ultrasound results.      Assessment/Plan:  38 year old  at 37w5d here for follow up OB visit.    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  - Transverse lie on 4/15           Fetal CHD: (not addressed during visit today 4/15)  - Anticipated to require  evaluation which would " include transthoracic echocardiogram, cardiac MRI, and for cardiac CT. Will also require PGE after birth.  - CV surgery consult on 3/13.   - s/p NICU/SW consult 3/27     Routine PNC:  - Prenatal labs:  Rh: +  antibody: neg               HepB/HIV/RPR/HepC: nonreactive               GC/CT: neg               Rubella: immune                 GCT: 153  GTT: passed               UC: <10k mixed urogenital filiberto               GBS: negative  - Pap smear: due - recommend collection PP  - Immunizations:  s/p Flu and Tdap  - Genetic screening: low-risk NIPT. Declines invasive testing      Surveillance:  - Serial growth US.   - Weekly BPPs until delivery     Delivery planning:  - IOL at 39 weeks scheduled should spontaneous labor not occur. Planned for .  - Reviewed fetal presentation today and discussed that fetal presentation will be assessed with an US when she presents for her IOL. Reviewed some stretches and exercises to help promote cephalic presentation by next week. Briefly discussed ECV attempt if still transverse or possible c/s delivery recommendation if unable to assist fetus into a cephalic presentation.  - Labor analgesia: has not used any pain medication in the past, but will review options at upcoming visit  - Feeding: breastfeeding and formula  - Contraception plans: has used Nexplanon in the past and may return to that method.  - Postpartum visit(s) can occur with MFM or primary OB provider        20 minutes spent on the date of the encounter, doing chart review, history and exam, documentation and further activities as noted.      Elvie Bonilla CNM on 4/15/2024 at 2:12 PM

## 2024-04-15 NOTE — PROGRESS NOTES
Please refer to ultrasound report under 'Imaging' Studies of 'Chart Review' tabs.    Laron Dickinson M.D.

## 2024-04-24 ENCOUNTER — ANESTHESIA EVENT (OUTPATIENT)
Dept: OBGYN | Facility: CLINIC | Age: 39
End: 2024-04-24
Payer: COMMERCIAL

## 2024-04-24 ENCOUNTER — ANESTHESIA (OUTPATIENT)
Dept: OBGYN | Facility: CLINIC | Age: 39
End: 2024-04-24
Payer: COMMERCIAL

## 2024-04-24 ENCOUNTER — HOSPITAL ENCOUNTER (INPATIENT)
Facility: CLINIC | Age: 39
LOS: 2 days | Discharge: HOME | End: 2024-04-26
Attending: STUDENT IN AN ORGANIZED HEALTH CARE EDUCATION/TRAINING PROGRAM | Admitting: STUDENT IN AN ORGANIZED HEALTH CARE EDUCATION/TRAINING PROGRAM
Payer: COMMERCIAL

## 2024-04-24 PROBLEM — Z34.90 ENCOUNTER FOR INDUCTION OF LABOR: Status: ACTIVE | Noted: 2024-04-24

## 2024-04-24 LAB
ABO/RH(D): NORMAL
ANTIBODY SCREEN: NEGATIVE
ERYTHROCYTE [DISTWIDTH] IN BLOOD BY AUTOMATED COUNT: 13 % (ref 10–15)
HCT VFR BLD AUTO: 38 % (ref 35–47)
HGB BLD-MCNC: 13.1 G/DL (ref 11.7–15.7)
MCH RBC QN AUTO: 31.3 PG (ref 26.5–33)
MCHC RBC AUTO-ENTMCNC: 34.5 G/DL (ref 31.5–36.5)
MCV RBC AUTO: 91 FL (ref 78–100)
PLATELET # BLD AUTO: 240 10E3/UL (ref 150–450)
RBC # BLD AUTO: 4.19 10E6/UL (ref 3.8–5.2)
SPECIMEN EXPIRATION DATE: NORMAL
WBC # BLD AUTO: 11.3 10E3/UL (ref 4–11)

## 2024-04-24 PROCEDURE — 250N000009 HC RX 250

## 2024-04-24 PROCEDURE — 10907ZC DRAINAGE OF AMNIOTIC FLUID, THERAPEUTIC FROM PRODUCTS OF CONCEPTION, VIA NATURAL OR ARTIFICIAL OPENING: ICD-10-PCS | Performed by: STUDENT IN AN ORGANIZED HEALTH CARE EDUCATION/TRAINING PROGRAM

## 2024-04-24 PROCEDURE — 85041 AUTOMATED RBC COUNT: CPT

## 2024-04-24 PROCEDURE — 86900 BLOOD TYPING SEROLOGIC ABO: CPT

## 2024-04-24 PROCEDURE — 120N000002 HC R&B MED SURG/OB UMMC

## 2024-04-24 PROCEDURE — 250N000013 HC RX MED GY IP 250 OP 250 PS 637

## 2024-04-24 PROCEDURE — 3E033VJ INTRODUCTION OF OTHER HORMONE INTO PERIPHERAL VEIN, PERCUTANEOUS APPROACH: ICD-10-PCS | Performed by: STUDENT IN AN ORGANIZED HEALTH CARE EDUCATION/TRAINING PROGRAM

## 2024-04-24 PROCEDURE — 36415 COLL VENOUS BLD VENIPUNCTURE: CPT

## 2024-04-24 PROCEDURE — 258N000003 HC RX IP 258 OP 636

## 2024-04-24 PROCEDURE — 99207 PR NO BILLABLE SERVICE THIS VISIT: CPT | Performed by: STUDENT IN AN ORGANIZED HEALTH CARE EDUCATION/TRAINING PROGRAM

## 2024-04-24 PROCEDURE — 370N000003 HC ANESTHESIA WARD SERVICE: Performed by: ANESTHESIOLOGY

## 2024-04-24 RX ORDER — CITRIC ACID/SODIUM CITRATE 334-500MG
30 SOLUTION, ORAL ORAL
Status: DISCONTINUED | OUTPATIENT
Start: 2024-04-24 | End: 2024-04-25 | Stop reason: HOSPADM

## 2024-04-24 RX ORDER — MISOPROSTOL 200 UG/1
800 TABLET ORAL
Status: DISCONTINUED | OUTPATIENT
Start: 2024-04-24 | End: 2024-04-25 | Stop reason: HOSPADM

## 2024-04-24 RX ORDER — METOCLOPRAMIDE 10 MG/1
10 TABLET ORAL EVERY 6 HOURS PRN
Status: DISCONTINUED | OUTPATIENT
Start: 2024-04-24 | End: 2024-04-25 | Stop reason: HOSPADM

## 2024-04-24 RX ORDER — OXYTOCIN/0.9 % SODIUM CHLORIDE 30/500 ML
1-24 PLASTIC BAG, INJECTION (ML) INTRAVENOUS CONTINUOUS
Status: DISCONTINUED | OUTPATIENT
Start: 2024-04-24 | End: 2024-04-25 | Stop reason: HOSPADM

## 2024-04-24 RX ORDER — METOCLOPRAMIDE HYDROCHLORIDE 5 MG/ML
10 INJECTION INTRAMUSCULAR; INTRAVENOUS EVERY 6 HOURS PRN
Status: DISCONTINUED | OUTPATIENT
Start: 2024-04-24 | End: 2024-04-25 | Stop reason: HOSPADM

## 2024-04-24 RX ORDER — NALOXONE HYDROCHLORIDE 0.4 MG/ML
0.2 INJECTION, SOLUTION INTRAMUSCULAR; INTRAVENOUS; SUBCUTANEOUS
Status: DISCONTINUED | OUTPATIENT
Start: 2024-04-24 | End: 2024-04-25 | Stop reason: HOSPADM

## 2024-04-24 RX ORDER — SODIUM CHLORIDE, SODIUM LACTATE, POTASSIUM CHLORIDE, CALCIUM CHLORIDE 600; 310; 30; 20 MG/100ML; MG/100ML; MG/100ML; MG/100ML
INJECTION, SOLUTION INTRAVENOUS CONTINUOUS PRN
Status: DISCONTINUED | OUTPATIENT
Start: 2024-04-24 | End: 2024-04-25 | Stop reason: HOSPADM

## 2024-04-24 RX ORDER — CARBOPROST TROMETHAMINE 250 UG/ML
250 INJECTION, SOLUTION INTRAMUSCULAR
Status: DISCONTINUED | OUTPATIENT
Start: 2024-04-24 | End: 2024-04-25 | Stop reason: HOSPADM

## 2024-04-24 RX ORDER — ONDANSETRON 4 MG/1
4 TABLET, ORALLY DISINTEGRATING ORAL EVERY 6 HOURS PRN
Status: DISCONTINUED | OUTPATIENT
Start: 2024-04-24 | End: 2024-04-25 | Stop reason: HOSPADM

## 2024-04-24 RX ORDER — LOPERAMIDE HCL 2 MG
4 CAPSULE ORAL
Status: DISCONTINUED | OUTPATIENT
Start: 2024-04-24 | End: 2024-04-25 | Stop reason: HOSPADM

## 2024-04-24 RX ORDER — NALOXONE HYDROCHLORIDE 0.4 MG/ML
0.4 INJECTION, SOLUTION INTRAMUSCULAR; INTRAVENOUS; SUBCUTANEOUS
Status: DISCONTINUED | OUTPATIENT
Start: 2024-04-24 | End: 2024-04-25 | Stop reason: HOSPADM

## 2024-04-24 RX ORDER — PROCHLORPERAZINE 25 MG
25 SUPPOSITORY, RECTAL RECTAL EVERY 12 HOURS PRN
Status: DISCONTINUED | OUTPATIENT
Start: 2024-04-24 | End: 2024-04-25 | Stop reason: HOSPADM

## 2024-04-24 RX ORDER — LIDOCAINE 40 MG/G
CREAM TOPICAL
Status: DISCONTINUED | OUTPATIENT
Start: 2024-04-24 | End: 2024-04-25 | Stop reason: HOSPADM

## 2024-04-24 RX ORDER — SODIUM CHLORIDE, SODIUM LACTATE, POTASSIUM CHLORIDE, CALCIUM CHLORIDE 600; 310; 30; 20 MG/100ML; MG/100ML; MG/100ML; MG/100ML
INJECTION, SOLUTION INTRAVENOUS CONTINUOUS
Status: DISCONTINUED | OUTPATIENT
Start: 2024-04-24 | End: 2024-04-25 | Stop reason: HOSPADM

## 2024-04-24 RX ORDER — LOPERAMIDE HCL 2 MG
2 CAPSULE ORAL
Status: DISCONTINUED | OUTPATIENT
Start: 2024-04-24 | End: 2024-04-25 | Stop reason: HOSPADM

## 2024-04-24 RX ORDER — OXYTOCIN/0.9 % SODIUM CHLORIDE 30/500 ML
100-340 PLASTIC BAG, INJECTION (ML) INTRAVENOUS CONTINUOUS PRN
Status: DISCONTINUED | OUTPATIENT
Start: 2024-04-24 | End: 2024-04-26 | Stop reason: HOSPADM

## 2024-04-24 RX ORDER — TRANEXAMIC ACID 10 MG/ML
1 INJECTION, SOLUTION INTRAVENOUS EVERY 30 MIN PRN
Status: DISCONTINUED | OUTPATIENT
Start: 2024-04-24 | End: 2024-04-25 | Stop reason: HOSPADM

## 2024-04-24 RX ORDER — KETOROLAC TROMETHAMINE 30 MG/ML
30 INJECTION, SOLUTION INTRAMUSCULAR; INTRAVENOUS
Status: DISCONTINUED | OUTPATIENT
Start: 2024-04-24 | End: 2024-04-26 | Stop reason: HOSPADM

## 2024-04-24 RX ORDER — METHYLERGONOVINE MALEATE 0.2 MG/ML
200 INJECTION INTRAVENOUS
Status: DISCONTINUED | OUTPATIENT
Start: 2024-04-24 | End: 2024-04-25 | Stop reason: HOSPADM

## 2024-04-24 RX ORDER — OXYTOCIN 10 [USP'U]/ML
10 INJECTION, SOLUTION INTRAMUSCULAR; INTRAVENOUS
Status: DISCONTINUED | OUTPATIENT
Start: 2024-04-24 | End: 2024-04-25 | Stop reason: HOSPADM

## 2024-04-24 RX ORDER — IBUPROFEN 800 MG/1
800 TABLET, FILM COATED ORAL
Status: DISCONTINUED | OUTPATIENT
Start: 2024-04-24 | End: 2024-04-26 | Stop reason: HOSPADM

## 2024-04-24 RX ORDER — MISOPROSTOL 200 UG/1
400 TABLET ORAL
Status: DISCONTINUED | OUTPATIENT
Start: 2024-04-24 | End: 2024-04-25 | Stop reason: HOSPADM

## 2024-04-24 RX ORDER — FENTANYL CITRATE 50 UG/ML
100 INJECTION, SOLUTION INTRAMUSCULAR; INTRAVENOUS
Status: DISCONTINUED | OUTPATIENT
Start: 2024-04-24 | End: 2024-04-25 | Stop reason: HOSPADM

## 2024-04-24 RX ORDER — PROCHLORPERAZINE MALEATE 10 MG
10 TABLET ORAL EVERY 6 HOURS PRN
Status: DISCONTINUED | OUTPATIENT
Start: 2024-04-24 | End: 2024-04-25 | Stop reason: HOSPADM

## 2024-04-24 RX ORDER — CITRIC ACID/SODIUM CITRATE 334-500MG
30 SOLUTION, ORAL ORAL ONCE
Status: COMPLETED | OUTPATIENT
Start: 2024-04-24 | End: 2024-04-25

## 2024-04-24 RX ORDER — OXYTOCIN/0.9 % SODIUM CHLORIDE 30/500 ML
340 PLASTIC BAG, INJECTION (ML) INTRAVENOUS CONTINUOUS PRN
Status: DISCONTINUED | OUTPATIENT
Start: 2024-04-24 | End: 2024-04-25 | Stop reason: HOSPADM

## 2024-04-24 RX ORDER — MISOPROSTOL 100 UG/1
25 TABLET ORAL EVERY 4 HOURS PRN
Status: DISCONTINUED | OUTPATIENT
Start: 2024-04-24 | End: 2024-04-25 | Stop reason: HOSPADM

## 2024-04-24 RX ORDER — OXYTOCIN 10 [USP'U]/ML
10 INJECTION, SOLUTION INTRAMUSCULAR; INTRAVENOUS
Status: DISCONTINUED | OUTPATIENT
Start: 2024-04-24 | End: 2024-04-26 | Stop reason: HOSPADM

## 2024-04-24 RX ORDER — ACETAMINOPHEN 325 MG/1
650 TABLET ORAL EVERY 4 HOURS PRN
Status: DISCONTINUED | OUTPATIENT
Start: 2024-04-24 | End: 2024-04-25 | Stop reason: HOSPADM

## 2024-04-24 RX ORDER — ONDANSETRON 2 MG/ML
4 INJECTION INTRAMUSCULAR; INTRAVENOUS EVERY 6 HOURS PRN
Status: DISCONTINUED | OUTPATIENT
Start: 2024-04-24 | End: 2024-04-25 | Stop reason: HOSPADM

## 2024-04-24 RX ADMIN — Medication 2 MILLI-UNITS/MIN: at 16:41

## 2024-04-24 RX ADMIN — MISOPROSTOL 25 MCG: 100 TABLET ORAL at 10:04

## 2024-04-24 RX ADMIN — SODIUM CHLORIDE, POTASSIUM CHLORIDE, SODIUM LACTATE AND CALCIUM CHLORIDE: 600; 310; 30; 20 INJECTION, SOLUTION INTRAVENOUS at 16:40

## 2024-04-24 ASSESSMENT — ACTIVITIES OF DAILY LIVING (ADL)
ADLS_ACUITY_SCORE: 18

## 2024-04-24 NOTE — PROGRESS NOTES
FHT review     Baseline 130, moderate variability, accels present   TOCO: 3-4 in ten     A/P: 39 yo  at 39w0d here for IOL. Pregnancy notable for fetal CHD which will require immediate echo in the NICU with peds cards consult. Has unstable lie.   - S/p PV miso x1, avoid balloon in s/o unstable lie   - Will continue w/ PV miso. Pit/AROM prn (will re-scan prior to arom)   - Does not desire epidural   - GBS negative   - FHT category I, continuous monitoring.     Rylie Reeves MD   OBGYN resident PGY3  2024 1:15 PM

## 2024-04-24 NOTE — PROGRESS NOTES
Labor progress    S: patient comfortable     Baseline 145, moderate variability, accels present   TOCO: 4-5 in ten     SVE: 2/60/H, soft, membranes stripped     BSUS: cephalic     A/P: 37 yo  at 39w0d here for IOL. Pregnancy notable for fetal CHD which will require immediate echo in the NICU with peds cards consult. Has unstable lie.   - S/p PV miso x1, avoid balloon in s/o unstable lie; cannot give additional dose of miso given contraction pattern; too high to AROM at this time  - Will start Pitocin   - Does not desire epidural   - GBS negative   - FHT category I, continuous monitoring.     Rylie Reeves MD   OBGYN resident PGY3  2024 3:57 PM

## 2024-04-24 NOTE — ANESTHESIA PREPROCEDURE EVALUATION
"Anesthesia Pre-Procedure Evaluation    Patient: Sampson Fonseca   MRN: [id number] : 1985        Procedure :           Past Medical History:   Diagnosis Date    Fetal cardiac anomaly affecting pregnancy, antepartum 2023    NO ACTIVE PROBLEMS       Past Surgical History:   Procedure Laterality Date    NO HISTORY OF SURGERY      NO PAST SURGERIES        No Known Allergies   Social History     Tobacco Use    Smoking status: Never    Smokeless tobacco: Never    Tobacco comments:     Chews betel nut and tobacco 4-6 x's per day   Substance Use Topics    Alcohol use: No      Wt Readings from Last 1 Encounters:   04/15/24 73.9 kg (163 lb)        Anesthesia Evaluation   Pt has not had prior anesthetic     No history of anesthetic complications       ROS/MED HX  ENT/Pulmonary:  - neg pulmonary ROS     Neurologic:  - neg neurologic ROS     Cardiovascular:  - neg cardiovascular ROS     METS/Exercise Tolerance:     Hematologic:  - neg hematologic  ROS     Musculoskeletal:       GI/Hepatic:  - neg GI/hepatic ROS     Renal/Genitourinary:       Endo:  - neg endo ROS     Psychiatric/Substance Use: Comment: Chewing tobacco on nicotine gum      Infectious Disease:       Malignancy:       Other:   IOL for AMA and Fetus with Double Outlet Right Ventricle   (-) previous        Physical Exam    Airway  airway exam normal           Respiratory Devices and Support         Dental       (+) Minor Abnormalities - some fillings, tiny chips      Cardiovascular   cardiovascular exam normal          Pulmonary   pulmonary exam normal                OUTSIDE LABS:  CBC:   Lab Results   Component Value Date    WBC 12.9 (H) 2024    WBC 11.3 (H) 01/15/2024    HGB 11.4 (L) 2024    HGB 12.4 01/15/2024    HCT 31.0 (L) 2024    HCT 34.4 (L) 01/15/2024     2024     01/15/2024     BMP: No results found for: \"NA\", \"POTASSIUM\", \"CHLORIDE\", \"CO2\", \"BUN\", \"CR\", \"GLC\"  COAGS: No results found for: \"PTT\", \"INR\", " "\"FIBR\"  POC:   Lab Results   Component Value Date    HCG Positive (A) 09/01/2023     HEPATIC: No results found for: \"ALBUMIN\", \"PROTTOTAL\", \"ALT\", \"AST\", \"GGT\", \"ALKPHOS\", \"BILITOTAL\", \"BILIDIRECT\", \"SOLANGE\"  OTHER:   Lab Results   Component Value Date    A1C 4.7 10/09/2023    SED 20 05/31/2018       Anesthesia Plan    ASA Status:  3       Anesthesia Type: Epidural.              Consents    Anesthesia Plan(s) and associated risks, benefits, and realistic alternatives discussed. Questions answered and patient/representative(s) expressed understanding.     - Discussed:     - Discussed with:  Patient            Postoperative Care            Comments:               Doug Srinivasan MD    I have reviewed the pertinent notes and labs in the chart from the past 30 days and (re)examined the patient.  Any updates or changes from those notes are reflected in this note.                  "

## 2024-04-24 NOTE — PLAN OF CARE
Pt here for IOL-fetal CHD. Needs Beramise . Bedside ultrasound shows head a little to pts left.  shifted the head to a more cephalic/midline position. Vaginal miso x1. Angela too much to get a second dose of medication. Pitocin ordered. Started per protocol. Can not increase pitocin due to contraction pattern. Will increase when able. Pt feeling contractions but not very strong. Call light in reach.

## 2024-04-24 NOTE — H&P
OB HISTORY AND PHYSICAL    Patient: Sampson Fonseca   MRN#: [id number]  YOB: 1985     HPI: Sampson Fonseca is a 38 year old  at 39w0d who presents today for IOL.    She reports good fetal movement. Denies LOF, vaginal bleeding, or contractions .      She denies fever, chills, headache, vision changes, SOB, chest pain, palpitations, nausea, emesis, RUQ pain, epigastric pain, dysuria, change in vaginal discharge, LE swelling/tenderness.      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  - Transverse lie on 4/15    Has 4 prior vaginal deliveries, most recent .     OBGYN History:   -  s/p     Prenatal Lab Results:  Lab Results   Component Value Date    HCT 31.0 2024    HGB 11.4 2024    HGB 11.7 2017    RPR Non-Reactive 2017     Patient Active Problem List    Diagnosis Date Noted    Fetal cardiac anomaly affecting pregnancy, antepartum 2023     Priority: High     FETAL HEART DEFECT: Planned delivery at [facility].  INTERVENTION NEEDED afterbirth extensively. See 3/13/24 Peds Cardiology Consult for details.  Cardiology consultation and echocardiogram is RECOMMENDED IMMEDIATELY after delivery!!  24 Fetal ECHO: Double outlet right ventricle with D-mal-posed great arteries with aortic arch hypoplasia and large sub-pulmonary ventricular septal defect. The pulmonary valve and main pulmonary artery are dilated. The aortic valve is mildly hypoplastic; measures about 0.45 cm with a z-score of -2.34. Moderate to severe hypoplasia of the transverse arch and severe hypoplasia of the aortic  isthmus with antegrade flow. The coarctation is juxtaductal. Normal right and left ventricular size and systolic function. No pericardial effusion. Please refer to consultation note for details. Prostaglandin therapy should be commenced if there are signs of coarctation, such as poor perfusion,  decreased femoral pulses or a significant upper to lower limb blood. pressure gradient.  Delivery is recommended at [facility]. Cardiology consultation and echocardiogram is recommended immediately after delivery.  3/27/24 MFM: Cephalic, ant placenta, nl EVETTE, MVP 6.6 cm, EFW 2380 g at 27% and AC 58%, BPP 8/8, known fetal congenital heart defect with no other anomalies detected.   4/10/24 MFM: Cephalic, ant placenta, nl EVETTE, MVP 6.4 cm and BPP 8/8. PLAN: Continue weekly BPP at [facility] APPT: 4/15/24 at 2:15 PM      Language barrier, cultural differences 10/09/2023     Priority: High     [place]      Supervision of high-risk pregnancy of elderly multigravida 10/09/2023     Priority: High     High risk due to: language barrier, possible umbilical cord syst, AMA, vaginal spotting in 1st trimester, subchorionic hemorrhage in 1st trimester, and betel nt/tobacco use -Patient to see OB educator/ RN today and/or next visit per Dr Pace./Desiree Mccollum RN BSN       Encounter for induction of labor 04/24/2024     Priority: Medium    Tobacco abuse 10/09/2023     Priority: Medium     TOBACCO/BETEL NUT USE: 12/29/23 Chewing nicotine gum 4-5 times/day and has slightly decreased chewing tobacco/betel nut from 4-5 times/day to 3 times/day. PLAN: Increase chewing nicotine gum up to 8-9 x's per day and decreasing betel nut/chewing tobacco.  10/9/23 Pt reports that she chews betel nut and tobacco 4-6 x's per day.  Discussed need for cessation during pregnancy and pt denies needing assistance with cessation as quit with her other pregnancies.      Betel deposit on teeth 10/09/2023     Priority: Medium     TOBACCO/BETEL NUT USE: 12/29/23 Chewing nicotine gum 4-5 times/day and has slightly decreased chewing tobacco/betel nut from 4-5 times/day to 3 times/day. PLAN: Increase chewing nicotine gum up to 8-9 x's per day and decreasing betel nut/chewing tobacco.  10/9/23 Pt reports that she chews betel nut and tobacco 4-6 x's per day.   Discussed need for cessation during pregnancy and pt denies needing assistance with cessation as quit with her other pregnancies.      AMA (advanced maternal age) multigravida 35+ 10/09/2023     Priority: Medium     AMA: 10/16/23 Cell free DNA RESULTS: Results indicate a low risk for fetal aneuploidy involving chromosomes 21, 18, 13, sex chromosomes, and 22q deletion syndrome. Fetal sex was not disclosed per patient wishes. This puts her current pregnancy at low risk for Down syndrome, trisomy 18, trisomy 13, sex chromosome abnormalities and 22q deletion syndrome.           Migraine 05/10/2019     Priority: Medium     10/9/23 Pt reports that she has migraines up to 1-2 x's per week and they are the same during pregnancy.      Sebaceous cyst 06/07/2018     Priority: Medium     scalp         HISTORY  Past Medical History:   Diagnosis Date    Fetal cardiac anomaly affecting pregnancy, antepartum 12/5/2023    NO ACTIVE PROBLEMS        Past Surgical History:   Procedure Laterality Date    NO HISTORY OF SURGERY      NO PAST SURGERIES         Family History   Problem Relation Age of Onset    Cancer Mother     Throat cancer Mother     Diabetes Father     No Known Problems Maternal Grandmother     No Known Problems Maternal Grandfather     No Known Problems Paternal Grandmother     No Known Problems Paternal Grandfather     No Known Problems Brother     No Known Problems Sister     No Known Problems Son     No Known Problems Daughter     No Known Problems Maternal Half-Brother     No Known Problems Maternal Half-Sister     No Known Problems Paternal Half-Brother     No Known Problems Paternal Half-Sister     No Known Problems Niece     No Known Problems Nephew     No Known Problems Cousin     No Known Problems Other     Heart Disease No family hx of     Coronary Artery Disease No family hx of     Hypertension No family hx of     Hyperlipidemia No family hx of     Kidney Disease No family hx of     Cerebrovascular Disease No  family hx of     Obesity No family hx of     Thrombosis No family hx of     Asthma No family hx of     Arthritis No family hx of     Thyroid Disease No family hx of     Depression No family hx of     Mental Illness No family hx of     Substance Abuse No family hx of     Cystic Fibrosis No family hx of     Early Death No family hx of     Coronary Artery Disease Early Onset No family hx of     Heart Failure No family hx of     Bleeding Diathesis No family hx of     Dementia No family hx of     Breast Cancer No family hx of     Ovarian Cancer No family hx of     Uterine Cancer No family hx of     Prostate Cancer No family hx of     Colorectal Cancer No family hx of     Pancreatic Cancer No family hx of     Lung Cancer No family hx of     Melanoma No family hx of     Autoimmune Disease No family hx of     Unknown/Adopted No family hx of     Genetic Disorder No family hx of        Social History     Tobacco Use    Smoking status: Never    Smokeless tobacco: Never    Tobacco comments:     Chews betel nut and tobacco 4-6 x's per day   Substance Use Topics    Alcohol use: No    Drug use: No       Medications Prior to Admission   Medication Sig Dispense Refill Last Dose    Prenatal Vit-Fe Fumarate-FA (PRENATAL MULTIVITAMIN W/IRON) 27-0.8 MG tablet Take 1 tablet by mouth daily 90 tablet 3 4/23/2024    acetaminophen (TYLENOL) 500 MG tablet Take 1-2 tablets (500-1,000 mg) by mouth every 6 hours as needed for mild pain (Patient not taking: Reported on 4/10/2024) 90 tablet 1     calcium carbonate (OS-KODY) 1500 (600 Ca) MG tablet Take 1 tablet (600 mg) by mouth 2 times daily (Patient not taking: Reported on 4/10/2024) 180 tablet 4        No Known Allergies     REVIEW OF SYSTEMS:  A 10 point review of systems was completed and was negative other than as noted in the HPI.    PHYSICAL EXAM  LMP 06/13/2023     Gen: well appearing   CV: RRR, nl S1/S2, no murmurs/clicks/gallops  Lungs: CTAB, non-labored breathing  Abd: Gravid,  non-tender, non-distended  Ext: trace peripheral extremity edema    Cervix: /70/H  Membranes: intact  Presentation: initially transverse by BSUS w/ head maternal lower left. Easily shifted to cephalic.  Estimated Fetal Weight: 7#    FHT:  Monitoring External  FHT: Baseline 130 bpm; moderate variability; + accels present; - decelerations  TOCO 2 contractions in 10 minutes    Studies: T&S, CBC, RPR    Assessment & Plan: 38 year old  at 39w0d, here for IOL in the setting of fetal CHD. Pregnancy is otherwise notable for AMA and unstable lie.     # Induction of Labor  Cervix: as above.  - Admit for IOL   - Will plan to start with PV miso given above Winchester's score; avoid balloon given unstable lie   - Membranes: intact  - Augmentation: Pitocin, AROM prn- will scan prior to AROM given unstable lie   - Labs: CBC, T&S, RPR  - GBS negative  - Pain Control: Per patient request; Discussed options. Declines anything.   - Diet: Regular  - PPH Meds: Standard Meds  - PPx: SCDs for prolonged periods of immobility    # Fetal CHD:  - Anticipated to require  evaluation which would include transthoracic echocardiogram, cardiac MRI, and for cardiac CT. Will also require PGE after birth.  - CV surgery consult on 3/13.   - s/p NICU/SW consult 3/27     Routine PNC:  - Prenatal labs:  Rh: +  antibody: neg               HepB/HIV/RPR/HepC: nonreactive               GC/CT: neg               Rubella: immune                 GCT: 153  GTT: passed               UC: <10k mixed urogenital filiberto               GBS: negative  - Pap smear: due - recommend collection PP  - Immunizations:  s/p Flu and Tdap  - Genetic screening: low-risk NIPT. Declines invasive testing  - Breast/formula feeding  - May consider nexplanon PP      Discussed with Dr. Valle.     Rylie Reeves MD  OBGYN Resident, PGY-3  24 9:41 AM      I personally examined and evaluated Sampson D Mo on 2024. Saw patient with a [place] . I discussed the  patient with Dr. Reeves and agree with the presentation, exam and plan of care documented in this note with edits by me.   Amaya Valle MD

## 2024-04-25 PROCEDURE — 258N000003 HC RX IP 258 OP 636

## 2024-04-25 PROCEDURE — 722N000001 HC LABOR CARE VAGINAL DELIVERY SINGLE

## 2024-04-25 PROCEDURE — 59410 OBSTETRICAL CARE: CPT | Performed by: ANESTHESIOLOGY

## 2024-04-25 PROCEDURE — 250N000011 HC RX IP 250 OP 636

## 2024-04-25 PROCEDURE — 00HU33Z INSERTION OF INFUSION DEVICE INTO SPINAL CANAL, PERCUTANEOUS APPROACH: ICD-10-PCS | Performed by: ANESTHESIOLOGY

## 2024-04-25 PROCEDURE — 3E0R3BZ INTRODUCTION OF ANESTHETIC AGENT INTO SPINAL CANAL, PERCUTANEOUS APPROACH: ICD-10-PCS | Performed by: ANESTHESIOLOGY

## 2024-04-25 PROCEDURE — 250N000013 HC RX MED GY IP 250 OP 250 PS 637

## 2024-04-25 PROCEDURE — 59409 OBSTETRICAL CARE: CPT | Mod: GC | Performed by: OBSTETRICS & GYNECOLOGY

## 2024-04-25 PROCEDURE — 120N000002 HC R&B MED SURG/OB UMMC

## 2024-04-25 PROCEDURE — 250N000011 HC RX IP 250 OP 636: Performed by: ANESTHESIOLOGY

## 2024-04-25 RX ORDER — MISOPROSTOL 200 UG/1
800 TABLET ORAL
Status: DISCONTINUED | OUTPATIENT
Start: 2024-04-25 | End: 2024-04-26 | Stop reason: HOSPADM

## 2024-04-25 RX ORDER — IBUPROFEN 600 MG/1
600 TABLET, FILM COATED ORAL EVERY 6 HOURS PRN
Qty: 60 TABLET | Refills: 0 | Status: SHIPPED | OUTPATIENT
Start: 2024-04-25

## 2024-04-25 RX ORDER — OXYTOCIN/0.9 % SODIUM CHLORIDE 30/500 ML
340 PLASTIC BAG, INJECTION (ML) INTRAVENOUS CONTINUOUS PRN
Status: DISCONTINUED | OUTPATIENT
Start: 2024-04-25 | End: 2024-04-26 | Stop reason: HOSPADM

## 2024-04-25 RX ORDER — FENTANYL CITRATE-0.9 % NACL/PF 10 MCG/ML
100 PLASTIC BAG, INJECTION (ML) INTRAVENOUS EVERY 5 MIN PRN
Status: DISCONTINUED | OUTPATIENT
Start: 2024-04-25 | End: 2024-04-25 | Stop reason: HOSPADM

## 2024-04-25 RX ORDER — AMOXICILLIN 250 MG
1 CAPSULE ORAL DAILY
Qty: 100 TABLET | Refills: 0 | Status: SHIPPED | OUTPATIENT
Start: 2024-04-25

## 2024-04-25 RX ORDER — FENTANYL/ROPIVACAINE/NS/PF 2MCG/ML-.1
PLASTIC BAG, INJECTION (ML) EPIDURAL
Status: DISCONTINUED | OUTPATIENT
Start: 2024-04-25 | End: 2024-04-25 | Stop reason: HOSPADM

## 2024-04-25 RX ORDER — HYDROCORTISONE 25 MG/G
CREAM TOPICAL 3 TIMES DAILY PRN
Status: DISCONTINUED | OUTPATIENT
Start: 2024-04-25 | End: 2024-04-26 | Stop reason: HOSPADM

## 2024-04-25 RX ORDER — METHYLERGONOVINE MALEATE 0.2 MG/ML
200 INJECTION INTRAVENOUS
Status: DISCONTINUED | OUTPATIENT
Start: 2024-04-25 | End: 2024-04-26 | Stop reason: HOSPADM

## 2024-04-25 RX ORDER — IBUPROFEN 800 MG/1
800 TABLET, FILM COATED ORAL EVERY 6 HOURS PRN
Status: DISCONTINUED | OUTPATIENT
Start: 2024-04-25 | End: 2024-04-26 | Stop reason: HOSPADM

## 2024-04-25 RX ORDER — OXYTOCIN 10 [USP'U]/ML
10 INJECTION, SOLUTION INTRAMUSCULAR; INTRAVENOUS
Status: DISCONTINUED | OUTPATIENT
Start: 2024-04-25 | End: 2024-04-26 | Stop reason: HOSPADM

## 2024-04-25 RX ORDER — CARBOPROST TROMETHAMINE 250 UG/ML
250 INJECTION, SOLUTION INTRAMUSCULAR
Status: DISCONTINUED | OUTPATIENT
Start: 2024-04-25 | End: 2024-04-26 | Stop reason: HOSPADM

## 2024-04-25 RX ORDER — NALBUPHINE HYDROCHLORIDE 20 MG/ML
2.5-5 INJECTION, SOLUTION INTRAMUSCULAR; INTRAVENOUS; SUBCUTANEOUS EVERY 6 HOURS PRN
Status: DISCONTINUED | OUTPATIENT
Start: 2024-04-25 | End: 2024-04-26 | Stop reason: HOSPADM

## 2024-04-25 RX ORDER — ACETAMINOPHEN 325 MG/1
650 TABLET ORAL EVERY 4 HOURS PRN
Status: DISCONTINUED | OUTPATIENT
Start: 2024-04-25 | End: 2024-04-26 | Stop reason: HOSPADM

## 2024-04-25 RX ORDER — BISACODYL 10 MG
10 SUPPOSITORY, RECTAL RECTAL DAILY PRN
Status: DISCONTINUED | OUTPATIENT
Start: 2024-04-25 | End: 2024-04-26 | Stop reason: HOSPADM

## 2024-04-25 RX ORDER — ACETAMINOPHEN 325 MG/1
650 TABLET ORAL EVERY 6 HOURS PRN
Qty: 100 TABLET | Refills: 0 | Status: SHIPPED | OUTPATIENT
Start: 2024-04-25

## 2024-04-25 RX ORDER — TRANEXAMIC ACID 10 MG/ML
1 INJECTION, SOLUTION INTRAVENOUS EVERY 30 MIN PRN
Status: DISCONTINUED | OUTPATIENT
Start: 2024-04-25 | End: 2024-04-26 | Stop reason: HOSPADM

## 2024-04-25 RX ORDER — MODIFIED LANOLIN
OINTMENT (GRAM) TOPICAL
Status: DISCONTINUED | OUTPATIENT
Start: 2024-04-25 | End: 2024-04-26 | Stop reason: HOSPADM

## 2024-04-25 RX ORDER — LOPERAMIDE HCL 2 MG
2 CAPSULE ORAL
Status: DISCONTINUED | OUTPATIENT
Start: 2024-04-25 | End: 2024-04-26 | Stop reason: HOSPADM

## 2024-04-25 RX ORDER — DOCUSATE SODIUM 100 MG/1
100 CAPSULE, LIQUID FILLED ORAL DAILY
Status: DISCONTINUED | OUTPATIENT
Start: 2024-04-25 | End: 2024-04-26 | Stop reason: HOSPADM

## 2024-04-25 RX ORDER — BUPIVACAINE HYDROCHLORIDE 2.5 MG/ML
INJECTION, SOLUTION EPIDURAL; INFILTRATION; INTRACAUDAL
Status: COMPLETED | OUTPATIENT
Start: 2024-04-25 | End: 2024-04-25

## 2024-04-25 RX ORDER — LOPERAMIDE HCL 2 MG
4 CAPSULE ORAL
Status: DISCONTINUED | OUTPATIENT
Start: 2024-04-25 | End: 2024-04-26 | Stop reason: HOSPADM

## 2024-04-25 RX ORDER — MISOPROSTOL 200 UG/1
400 TABLET ORAL
Status: DISCONTINUED | OUTPATIENT
Start: 2024-04-25 | End: 2024-04-26 | Stop reason: HOSPADM

## 2024-04-25 RX ADMIN — BUPIVACAINE HYDROCHLORIDE 4 ML: 2.5 INJECTION, SOLUTION EPIDURAL; INFILTRATION; INTRACAUDAL at 02:20

## 2024-04-25 RX ADMIN — Medication: at 02:24

## 2024-04-25 RX ADMIN — ACETAMINOPHEN 650 MG: 325 TABLET, FILM COATED ORAL at 13:59

## 2024-04-25 RX ADMIN — SODIUM CHLORIDE, POTASSIUM CHLORIDE, SODIUM LACTATE AND CALCIUM CHLORIDE: 600; 310; 30; 20 INJECTION, SOLUTION INTRAVENOUS at 01:32

## 2024-04-25 RX ADMIN — DOCUSATE SODIUM 100 MG: 100 CAPSULE, LIQUID FILLED ORAL at 08:57

## 2024-04-25 RX ADMIN — ACETAMINOPHEN 650 MG: 325 TABLET, FILM COATED ORAL at 08:57

## 2024-04-25 RX ADMIN — ACETAMINOPHEN 650 MG: 325 TABLET, FILM COATED ORAL at 18:06

## 2024-04-25 RX ADMIN — ACETAMINOPHEN 650 MG: 325 TABLET, FILM COATED ORAL at 22:47

## 2024-04-25 RX ADMIN — SODIUM CHLORIDE, POTASSIUM CHLORIDE, SODIUM LACTATE AND CALCIUM CHLORIDE: 600; 310; 30; 20 INJECTION, SOLUTION INTRAVENOUS at 02:58

## 2024-04-25 RX ADMIN — KETOROLAC TROMETHAMINE 30 MG: 30 INJECTION, SOLUTION INTRAMUSCULAR at 08:57

## 2024-04-25 RX ADMIN — IBUPROFEN 800 MG: 800 TABLET, FILM COATED ORAL at 18:06

## 2024-04-25 ASSESSMENT — ACTIVITIES OF DAILY LIVING (ADL)
ADLS_ACUITY_SCORE: 19
ADLS_ACUITY_SCORE: 18
ADLS_ACUITY_SCORE: 18
ADLS_ACUITY_SCORE: 19
ADLS_ACUITY_SCORE: 18
ADLS_ACUITY_SCORE: 19
ADLS_ACUITY_SCORE: 18
ADLS_ACUITY_SCORE: 19
ADLS_ACUITY_SCORE: 18
ADLS_ACUITY_SCORE: 19

## 2024-04-25 NOTE — ANESTHESIA PROCEDURE NOTES
"Epidural catheter Procedure Note    Pre-Procedure   Staff -        Anesthesiologist:  Jamia Magallon MD       Resident/Fellow: Esteban Michel Jr., MD       Performed By: resident       Location: OB       Pre-Anesthestic Checklist: patient identified, IV checked, risks and benefits discussed, informed consent, monitors and equipment checked, pre-op evaluation, at physician/surgeon's request and post-op pain management  Timeout:       Correct Patient: Yes        Correct Procedure: Yes        Correct Site: Yes        Correct Position: Yes   Procedure Documentation  Procedure: epidural catheter       Patient Position: sitting       Skin prep: Chloraprep       Local skin infiltrated with 5 mL of 1% lidocaine.        Insertion Site: L3-4. (midline approach).       Technique: LORT saline        NILSON at 6 cm.       Needle Type: Touhy needle          Catheter threaded easily.         5 cm epidural space.         Threaded 11 cm at skin.         # of attempts: 1 and  # of redirects:     Assessment/Narrative         Paresthesias: No.       Test dose of 3 mL at 02:15 CDT.         Test dose negative, 3 minutes after injection, for signs of intravascular, subdural, or intrathecal injection.       Insertion/Infusion Method: LORT saline       Aspiration negative for Heme or CSF via Epidural Catheter.    Medication(s) Administered   0.25% Bupivacaine PF (Epidural) - EPIDURAL   4 mL - 4/25/2024 2:20:00 AM    FOR [facility] ([facility] ONLY:   Pain Team Contact information: please page the Pain Team Via [employer]. Search \"Pain\". During daytime hours, please page the attending first. At night please page the resident first.      "

## 2024-04-25 NOTE — PLAN OF CARE
Goal Outcome Evaluation:               Problem: Postpartum (Vaginal Delivery)  Goal: Optimal Pain Control and Function  Outcome: Progressing  Intervention: Prevent or Manage Pain  Recent Flowsheet Documentation  Taken 4/25/2024 1108 by Di Vilchis, RN  Perineal Care: perineum cleansed  Taken 4/25/2024 1107 by Di Vilchis, RN  Pain Management Interventions:   emotional support   heat applied     VSS. Afebrile. Up ad sylvie. Fundus 1/u off to right side.  Pt helped to BR and voided.  Post void fundus u/1 midline. Scant lochia. C/o some pain and medicated with relief. Hot pack to abdomen as well. Lunch ordered. SIster here and supportive. Tired and resting at moment.  Will pump later and wants to go see baby in CVICU. Will continue to monitor.

## 2024-04-25 NOTE — DISCHARGE SUMMARY
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    Please see her Admission H&P and Delivery Summary for further details.    Intrapartum Course:  Sampson Fonseca is a 38 year old now  who presented at 39w0 d for IOL in the setting of fetal congenital heart defect.  Pregnancy was notable for TUD. Her IOL began with PV misoprostol. She was augmented with IV Pitocin and was AROM'ed for clear fluid.  She received an epidural for pain control and was GBS neg so did not require antibiotics. Labor course was complicated by intermittent Cat II FHT however there was appropriate labor progress and responsiveness to intrauterine resuscitative measures.  She progressed to complete and began pushing.After 30 minutes of pushing, the fetal head was . The patient was not juancarlos and was instructed to stop pushing to wait for next contraction however patient was unable stop involuntarily pushing and the fetal head delivered. After delivery of the head, the anterior (left) shoulder did not develop with gentle downward traction and a shoulder dystocia was recognized and called. The patient was instructed to stop pushing. Suprapubic pressure was performed and the patient was placed in Steve. The anterior shoulder did not deliver so the posterior (right) arm  was attempted to be delivered however was unsuccessful. Ultimately, Rubins maneuver was performed and the infant delivered. Total time of shoulder dystocia: 30 seconds.  Apgars of 6 and 9 with weight of 3350 grams.  The cord was double clamped after 30 seconds and cut.  A cord segment and cord blood were obtained.  IV pitocin was started. The placenta was then delivered using gentle traction and suprapubic pressure.  The uterus was noted to be firm after fundal massage.  The perineum was assessed for lacerations and none were noted. Total QBL was 200.  The placenta appeared intact with a 3V umbilical cord.  Dr. Nelson was present for the entire procedure.     Postpartum Course:  Her postpartum course was uncomplicated. On PPD#1, she was meeting all of her postpartum goals and deemed stable for discharge. She was voiding without difficulty, tolerating a regular diet without nausea and vomiting, her pain was well controlled on oral pain medicines and her lochia was appropriate.     Her hemoglobin prior to delivery was 13.1 and after delivery was 10.9. Her Rh status was positive, and Rhogam was not indicated.     Discharge Medications:     Review of your medicines        UNREVIEWED medicines. Ask your doctor about these medicines        Dose / Directions   calcium carbonate 1500 (600 Ca) MG tablet  Commonly known as: OS-KODY  Used for: Supervision of high risk pregnancy, antepartum      Dose: 600 mg  Take 1 tablet (600 mg) by mouth 2 times daily  Quantity: 180 tablet  Refills: 4            START taking        Dose / Directions   ibuprofen 600 MG tablet  Commonly known as: ADVIL/MOTRIN  Used for:  (spontaneous vaginal delivery)      Dose: 600 mg  Take 1 tablet (600 mg) by mouth every 6 hours as needed for moderate pain Start after delivery  Quantity: 60 tablet  Refills: 0     senna-docusate 8.6-50 MG tablet  Commonly known as: SENOKOT-S/PERICOLACE  Used for:  (spontaneous vaginal delivery)      Dose: 1  tablet  Take 1 tablet by mouth daily Start after delivery.  Quantity: 100 tablet  Refills: 0            CONTINUE these medicines which may have CHANGED, or have new prescriptions. If we are uncertain of the size of tablets/capsules you have at home, strength may be listed as something that might have changed.        Dose / Directions   acetaminophen 325 MG tablet  Commonly known as: TYLENOL  This may have changed:   medication strength  how much to take  additional instructions  Used for:  (spontaneous vaginal delivery)      Dose: 650 mg  Take 2 tablets (650 mg) by mouth every 6 hours as needed for mild pain Start after Delivery.  Quantity: 100 tablet  Refills: 0            CONTINUE these medicines which have NOT CHANGED        Dose / Directions   prenatal multivitamin w/iron 27-0.8 MG tablet  Used for: Pregnancy examination or test, pregnancy unconfirmed      Dose: 1 tablet  Take 1 tablet by mouth daily  Quantity: 90 tablet  Refills: 3               Where to get your medicines        These medications were sent to [facility] [address]  [address] [place] [address], [facility] [address]      Phone: [phone]   acetaminophen 325 MG tablet  ibuprofen 600 MG tablet  senna-docusate 8.6-50 MG tablet       Discharge/Disposition:  Sampson D Mo was discharged to home in stable condition with the following instructions/medications:  1) Call for temperature > 100.4, bright red vaginal bleeding >1 pad an hour x 2 hours, foul smelling vaginal discharge, pain not controlled by usual oral pain meds, persistent nausea and vomiting not controlled on medications  2) She desired nothing for contraception.  3) For feeding she decided to breast feed .  4) She was instructed to follow-up with her primary OB in 6 weeks for a routine postpartum visit.  5) Discharge activity: Pelvic rest for 6 weeks.    Dana Sifuentes MD  Ob/Gyn PGY-2  24 2:18 PM

## 2024-04-25 NOTE — L&D DELIVERY NOTE
OB Vaginal Delivery Note    Sampson Fonseca MRN# [id number]   Age: 38 year old YOB: 1985     Sampson Fonseca is a 38 year old now  who presented at 39w0 d for IOL in the setting of fetal congenital heart defect.  Pregnancy was notable for TUD. Her IOL began with PV misoprostol. She was augmented with IV Pitocin and was AROM'ed for clear fluid.  She received an epidural for pain control and was GBS neg so did not require antibiotics. Labor course was complicated by intermittent Cat II FHT however there was appropriate labor progress and responsiveness to intrauterine resuscitative measures.  She progressed to complete and began pushing.After 30 minutes of pushing, the fetal head was . The patient was not juancarlos and was instructed to stop pushing to wait for next contraction however patient was unable stop involuntarily pushing and the fetal head delivered. After delivery of the head, the anterior (left) shoulder did not develop with gentle downward traction and a shoulder dystocia was recognized and called. The patient was instructed to stop pushing. Suprapubic pressure was performed and the patient was placed in Steve. The anterior shoulder did not deliver so the posterior (right) arm was attempted to be delivered however was unsuccessful. Ultimately, Rubins maneuver was performed and the infant delivered. Total time of shoulder dystocia: 30 seconds.  Apgars of 6 and 9 with weight of 3350 grams.  The cord was double clamped after 30 seconds and cut.  A cord segment and cord blood were obtained.  IV pitocin was started. The placenta was then delivered using gentle traction and suprapubic pressure.  The uterus was noted to be firm after fundal massage.  The perineum was assessed for lacerations and none were noted. Total QBL was 200.  The placenta appeared intact with a 3V umbilical cord.  Dr. Nelson was present for the entire procedure.     Dana Sifuentes MD  OB/GYN Resident PGY-2  9:25 AM April  2024     GA: 39w1d  GP:   Labor Complications: Shoulder Dystocia   EBL:   mL  Delivery QBL:    Delivery Type: Vaginal, Spontaneous   ROM to Delivery Time: (Delivered) Hours: 9 Minutes: 13  [place] Weight: 3.35 kg (7 lb 6.2 oz)    1 Minute 5 Minute 10 Minute   Apgar Totals:            DANA TEJADA;NICK STEWART;ABY JAMA;ARELY NELSON;ALIVIA CALVERT      Mo, Female-Sampson [[id number]]      Labor Event Times      Dilation complete date: 24 Complete time:  7:31 AM   Start pushing date/time: 2024 0738          Labor Length      2nd Stage (hrs): 0 (min): 33   3rd Stage (hrs): 0 (min): 4          Labor Events     labor?: No   steroids: None  Labor Type: Induction/Cervical ripening  Predominate monitoring during 1st stage: continuous electronic fetal monitoring     Antibiotics received during labor?: No       Rupture date/time: 24   Rupture type: Artificial Rupture of Membranes  Fluid color: Clear  Fluid odor: Normal     Induction: Misoprostol  Induction date/time:      Cervical ripening date/time:      Indications for induction: Fetal Indications or Congenital Malformations (Comment to specify)     Augmentation: Oxytocin, AROM       Delivery/Placenta Date and Time      Delivery Date: 24 Delivery Time:  8:04 AM   Placenta Date/Time: 2024  8:08 AM  Oxytocin given at the time of delivery: after delivery of baby  Delivering clinician: Dana Tejada MD   Other personnel present at delivery:  Provider Role   Dana Tejada MD Chin, Melissa, APRN CNM Certified Nurse Midwife   Aby Jama RN Delivery Nurse   Arely Nelson MD Assigned OBGYN Provider   Alivia Calvert RN Registered Nurse             Vaginal Counts       Initial count performed by 2 team members:  Two Team Members   carolann ambrose md         [place] Suture Needles Sponges (RETIRED) Instruments   Initial counts 2  5    Added to count        Relief counts       Final counts 2  5            Placed during labor Accounted for at the end of labor   FSE No NA   IUPC No NA   Cervidil No NA                  Final count performed by 2 team members:  Two Team Members   carolann ambrose md      Final count correct?: Yes  Pre-Birth Team Brief: Complete  Post-Birth Team Debrief: Complete       Apgars    Living status: Living   1 Minute 5 Minute 10 Minute 15 Minute 20 Minute   Skin color:        Heart rate:        Reflex irritability:        Muscle tone:        Respiratory effort:        Total:               Cord      Vessels: 3 Vessels    Cord Complications: None               Cord Blood Disposition: Discard    Gases Sent?: Yes    Delayed cord clamping?: No    Stem cell collection?: No           [place] Measurements      Weight: 7 lb 6.2 oz           Skin to Skin and Feeding Plan      Skin to skin initiation date/time: 1841    Skin to skin with: Mother  Skin to skin end date/time: 1841           Labor Events and Shoulder Dystocia    Fetal Tracing Prior to Delivery: Category 2  Shoulder dystocia present?: Pos  Anterior shoulder: left           First Maneuver: Steve maneuver, Suprapubic pressure         Second Maneuver:   Second maneuver: Delivery of the posterior arm     Third Maneuver  Third maneuver: Wynn maneuver          Delivery (Maternal) (Provider to Complete) (619997)    Episiotomy: None  Perineal lacerations: None    Repair suture: None  Genital tract inspection done: Pos       Blood Loss  Mother: Sampson Fonseca #[id number]     Start of Mother's Information      Delivery Blood Loss  24 - 24 0924      None                 End of Mother's Information  Mother: Sampson Fonseca #[id number]                Delivery - Provider to Complete ([id number])    Delivering clinician: Dana Sifuentes MD  Delivery Type (Choose the 1 that will go to the Birth History): Vaginal, Spontaneous                         Other personnel:   Provider Role   Dana Sifuentes MD Chin, Melissa, APRN CNM Certified Nurse Midwife   Chanelle Jama RN Delivery Nurse   Arely Nelson MD Assigned OBGYN Provider   Alivia Calvert RN Registered Nurse                    Placenta    Date/Time: 4/25/2024  8:08 AM  Removal: Expressed  Disposition: Hospital disposal             Anesthesia    Method: Epidural                    Presentation and Position    Presentation: Vertex    Position: Right Occiput Anterior                     Dana Sifuentes MD    I was present for, participated in, and supervised the delivery and resolution of the shoulder dystocia.   Arely Nelson MD

## 2024-04-25 NOTE — PLAN OF CARE
Assumed care following verbal report from JASMIN Dodson.    Patient was met with. Noted patient complains of increased abd pain from contractions. Patient unsure if feeling urge to push vs abd pain.     Provider Mabel paged to come check. Patient continued to complain of increasing pressure and the need to push.     SVE performed by RN and noted complete. MD Nelson was paged to come to bedside.     Omar present and MD Sifuentes present for start of pushing.

## 2024-04-25 NOTE — PROGRESS NOTES
[facility]  Labor Progress Note    Patient seen with iPad [place]     S:  Patient is doing okay. Mild pain with contractions.    O:   Patient Vitals for the past 4 hrs:   BP Temp Temp src Resp   24 2205 107/56 -- -- 14   24 2113 104/57 -- -- 16   24 2032 115/70 -- -- 15   24 1936 119/79 97.9  F (36.6  C) Oral 16     SVE: /-2    FHT: Baseline 130, moderate+ variability, + accelerations, intermittent variable decelerations  [place]: 3-4 contractions in 10 minutes    A/P:  Sampson D Mo is a 38 year old  at 39w0d here for IOL secondary to fetal CHD. Labor progressing as expected.    # Labor:   - Augmentation: s/p Miso x1. Currently on pitocin at 6U  - SVE: /-2  - Membranes: AROM discussed. Presentation confirmed with BSUS prior to AROM - cephalic. Performed. Copious amount of clear fluid   - Pain: aware of options. Desires unmedicated labor.   - Plan: Continue pitocin uptitration prn     FWB:  Category II, reactive and reassuring FHT  PNC: Rh positive, GBS negative, see H&P for additional details    Rigo Monroe DO, MS  Obstetrics, Gynecology & Women's Health   Resident, PGY-3  2024 11:24 PM

## 2024-04-25 NOTE — PLAN OF CARE
VSS, afebrile, pt resting comfortably with epidural. No vaginal bleeding present, leaking small amounts of clear fluid. EFM as charted, see flowsheets. IV pitocin infusing, titrated per protocol, see MAR. Pt denies feeling pressure or urge to push at this moment. Call light within reach, pt instructed to call RN if any changes or urge to push, most recent SVE 7/90/0. Continue current plan of care.

## 2024-04-25 NOTE — PROVIDER NOTIFICATION
04/25/24 0756   Provider Notification   Provider Name/Title Juanita Hurst CNM   Method of Notification Phone   Request Evaluate in Person   Notification Reason Other (Comment)     Juanita Hurst CNM was phoned per MD Nelson to come to bedside to assist with continuation of delivery. Juanita Hurst arrived at bedside at 6147

## 2024-04-25 NOTE — PROVIDER NOTIFICATION
04/25/24 0105   Provider Notification   Provider Name/Title Dr. Monroe   Method of Notification Electronic Page   Request Evaluate - Remote   Notification Reason Decels;Status Update;SVE     Pt reported urge to push. Prolonged decel after up to bathroom SVE per RN 4/70/-2. Provider updated

## 2024-04-25 NOTE — PROVIDER NOTIFICATION
"   04/25/24 0731   Provider Notification   Provider Name/Title Arely Nelson MD   Method of Notification Electronic Page   Request Evaluate in Person   Notification Reason Labor Status;Status Update     \"475 complete needs to push\".     MD responded at 0778 \"coming!\"  "

## 2024-04-25 NOTE — PLAN OF CARE
of viable Female with Omar Garvey and JANUARY Grant in attandance. FOB and patients sister present.  Infant was delivered and placed on mothers abdomen, dried and stimulated. Delayed cord clamp for 30 seconds due to Infant status and moved to warmer via NICU team for additional cares. See Delivery Summary.  Apgars 6/9 respectively .  Placenta delivered without complication, Pitocin bolused, NO laceration with NO repair, patience cares provided. Initial  mL. Mother and baby in stable condition.

## 2024-04-25 NOTE — PLAN OF CARE
Family education completed:Yes    Report given to: Di WISEMAN RN    Time of transfer:1050    Transferred to:[facility]    Belongings sent:Yes    Family updated:Yes    Reviewed pertinent information from EPIC (EMAR/Clinical Summary/Flowsheets):Yes    Head-to-toe assessment with receiving RN:Yes    Recommendations (e.g. Family needs/recent issues/things to watch for): Patient assisted to bathroom following Di's fundal check noted displaced.     Prior to arrival to [facility], Patient stopped at CVICU to see  in 3143.

## 2024-04-25 NOTE — PROVIDER NOTIFICATION
04/25/24 0150   Provider Notification   Provider Name/Title Dr. Monroe   Method of Notification At Bedside   Request Evaluate in Person   Notification Reason Pain     Pt requesting c/s stating pain is too intense, Dr. Monroe at bedside, reviewed pain control options with patient and given options ans time to discuss, all questions answered, pt requesting epidural for pain management in labor. Provider called anesthesia for provider to provider report. Anesthesia at bedside at 0202

## 2024-04-25 NOTE — PROGRESS NOTES
Transfer note: Pt transferred from L&D to [facility] into room 7138. Ambulated to room with SBA. Sister here and supportive. Pitocin infusing with LR. Room orientation done per Phone . Will continue to monitor.

## 2024-04-25 NOTE — PROGRESS NOTES
Brief Interval Progress Note    Patient seen with iPad Jose Manuel     Called to patient room by RN as patient who reports patient is declining pain medications and requesting CS    Patient tearful and reports she is having significant pain with contractions, with intermittent pressure. She cannot tolerate this anymore and would like pain relief. I reviewed pain management options with patient, and explained with CS we would recommend spinal which is similar to an epidural. Following our discussion patient now would like to first try an epidural for pain management then reassess after this. Did explain that we can readdress CS option following epidural placement should she truly no longer decided to proceed with induction process any longer. She is agreeable to this plan. Anesthesia resident called.     SVE deferred at this time. Last 4/70/-2 (RN check at appx 0100)    FHT: Baseline 120, moderate variability, accelerations +, no decelerations over past 1 hour  TOCO: 3-4 contractions in 10 minutes     Plan for epidural placement followed by reassessment of plan/mode of delivery. Continue pitocin, hold at current rate.     Rigo Monroe DO, MS  Obstetrics, Gynecology & Women's Health   Resident, PGY-3  04/25/2024 2:03 AM

## 2024-04-25 NOTE — PROVIDER NOTIFICATION
"   04/25/24 0724   Provider Notification   Provider Name/Title MD Mabel   Method of Notification Electronic Page   Request Evaluate in Person   Notification Reason Pain;Patient Request     Paged MD for patient update \"475 uncomfortable would like MD to check cervix\".     Paged Mabel due to other providers being offline and resident handoff taking place  "

## 2024-04-25 NOTE — PROGRESS NOTES
VSS. Fundus is firm without massage, midline, and at umbilicus. Bleeding is WDL. Tylenol and Ibuprofen given for pain management. Patient up independently. During this shift patient requested abdominal binder; given. Patient was encouraged to pump; Medela was cleaned and set up by this RN. Patient is eating well and is stable at time of report. Report given to PP nurse Mar CASTELLANOS

## 2024-04-25 NOTE — PROGRESS NOTES
Brief Interval Progress Note    In to patient room for evaluation. Now s/p epidural placement and very comfortable. Would like to proceed with induction. Pitocin remains on at 4U. FHT now with variable and intermittent late decelerations. Maternal repositioning started. Bolus going.     SVE performed 4/80/-2 (unchanged from previous)    FHT: Baseline 120, moderate variability, accelerations +, variable and intermittent late decelerations  TOCO: 3-4 contractions in 10 minutes     Continue intrauterine resuscitation attempts. If persistent will discontinue pitocin/reassess plan.     Rigo Monroe DO, MS  Obstetrics, Gynecology & Women's Health   Resident, PGY-3  04/25/2024 3:08 AM

## 2024-04-26 VITALS
DIASTOLIC BLOOD PRESSURE: 76 MMHG | TEMPERATURE: 98.5 F | OXYGEN SATURATION: 100 % | SYSTOLIC BLOOD PRESSURE: 112 MMHG | HEART RATE: 80 BPM | WEIGHT: 161.8 LBS | HEIGHT: 59 IN | BODY MASS INDEX: 32.62 KG/M2 | RESPIRATION RATE: 16 BRPM

## 2024-04-26 LAB
HGB BLD-MCNC: 10.9 G/DL (ref 11.7–15.7)
T PALLIDUM AB SER QL: NONREACTIVE

## 2024-04-26 PROCEDURE — 250N000013 HC RX MED GY IP 250 OP 250 PS 637

## 2024-04-26 PROCEDURE — 99238 HOSP IP/OBS DSCHRG MGMT 30/<: CPT | Mod: GC | Performed by: OBSTETRICS & GYNECOLOGY

## 2024-04-26 PROCEDURE — 36415 COLL VENOUS BLD VENIPUNCTURE: CPT

## 2024-04-26 PROCEDURE — 85018 HEMOGLOBIN: CPT

## 2024-04-26 PROCEDURE — 86780 TREPONEMA PALLIDUM: CPT

## 2024-04-26 RX ADMIN — DOCUSATE SODIUM 100 MG: 100 CAPSULE, LIQUID FILLED ORAL at 08:50

## 2024-04-26 RX ADMIN — ACETAMINOPHEN 650 MG: 325 TABLET, FILM COATED ORAL at 08:50

## 2024-04-26 RX ADMIN — IBUPROFEN 800 MG: 800 TABLET, FILM COATED ORAL at 15:05

## 2024-04-26 RX ADMIN — ACETAMINOPHEN 650 MG: 325 TABLET, FILM COATED ORAL at 15:05

## 2024-04-26 RX ADMIN — IBUPROFEN 800 MG: 800 TABLET, FILM COATED ORAL at 08:50

## 2024-04-26 ASSESSMENT — ACTIVITIES OF DAILY LIVING (ADL)
ADLS_ACUITY_SCORE: 18
ADLS_ACUITY_SCORE: 19
ADLS_ACUITY_SCORE: 18
ADLS_ACUITY_SCORE: 19
ADLS_ACUITY_SCORE: 18

## 2024-04-26 NOTE — DISCHARGE INSTRUCTIONS
Warning Signs after Having a Baby    Keep this paper on your fridge or somewhere else where you can see it.    Call your provider if you have any of these symptoms up to 12 weeks after having your baby.    Thoughts of hurting yourself or your baby  Pain in your chest or trouble breathing  Severe headache not helped by pain medicine  Eyesight concerns (blurry vision, seeing spots or flashes of light, other changes to eyesight)  Fainting, shaking or other signs of a seizure    Call 9-1-1 if you feel that it is an emergency.     The symptoms below can happen to anyone after giving birth. They can be very serious. Call your provider if you have any of these warning signs.    My provider s phone number: _______________________    Losing too much blood (hemorrhage)    Call your provider if you soak through a pad in less than an hour or pass blood clots bigger than a golf ball. These may be signs that you are bleeding too much.    Blood clots in the legs or lungs    After you give birth, your body naturally clots its blood to help prevent blood loss. Sometimes this increased clotting can happen in other areas of the body, like the legs or lungs. This can block your blood flow and be very dangerous.     Call your provider if you:  Have a red, swollen spot on the back of your leg that is warm or painful when you touch it.   Are coughing up blood.     Infection    Call your provider if you have any of these symptoms:  Fever of 100.4 F (38 C) or higher.  Pain or redness around your stitches if you had an incision.   Any yellow, white, or green fluid coming from places where you had stitches or surgery.    Mood Problems (postpartum depression)    Many people feel sad or have mood changes after having a baby. But for some people, these mood swings are worse.     Call your provider right away if you feel so anxious or nervous that you can't care for yourself or your baby.    Preeclampsia (high blood pressure)    Even if you  didn't have high blood pressure when you were pregnant, you are at risk for the high blood pressure disease called preeclampsia. This risk can last up to 12 weeks after giving birth.     Call your provider if you have:   Pain on your right side under your rib cage  Sudden swelling in the hands and face    Remember: You know your body. If something doesn't feel right, get medical help.     For informational purposes only. Not to replace the advice of your health care provider. Copyright 2020 [facility]. All rights reserved. Clinically reviewed by Rhoda Johnson, RNC-OB, MSN. [employer] 056551 - Rev 02/23.

## 2024-04-26 NOTE — PLAN OF CARE
Vital signs within normal limits. Postpartum checks within normal limits - see flow record. Patient eating and drinking normally. Patient able to empty bladder independently and is up ambulating. No apparent signs of infection.   Patient medicated during the shift for cramping. See MAR.     Problem: Postpartum ( Delivery)  Goal: Hemostasis  Outcome: Progressing     Problem: Postpartum ( Delivery)  Goal: Optimal Pain Control and Function  Outcome: Progressing

## 2024-04-26 NOTE — LACTATION NOTE
This note was copied from a baby's chart.      Lactation Admission Note      Baby Information:  Infant's first name:  Natacha Guerra   Infant medical history: had cardiac surgery today, just back at her room this evening, long day for family     needed? Yes; language needed: Jose Manuel  (Sampson's sister was present and assisted with interpretation for Sampson)    Lactation goal (if known): wants to breastfeed  Lactation history:  all previous children without issues, has never pumped and pumping is new to her    Mother's Information: Name: Sampson  Occupation:   Age: 38  Delivery type: vaginal   [place]: [place]  Pump for home use: will need a pump - should have a rental with infant not able to latch yet - however maybe a spectra but the only problem with that for Sampson is the flange sizes that they come with as standard size will be too small.    Partner's name: Misty Bentley  Occupation:     Relevant maternal medical and social hx:     Information for the patient's mother:  Sampson Fonseca [[id number]]     Past Medical History:   Diagnosis Date    Fetal cardiac anomaly affecting pregnancy, antepartum 12/5/2023    NO ACTIVE PROBLEMS     ,   Information for the patient's mother:  Sampson Fonseca [[id number]]     Patient Active Problem List   Diagnosis    Language barrier, cultural differences    Tobacco abuse    Betel deposit on teeth    Sebaceous cyst    Migraine    AMA (advanced maternal age) multigravida 35+    Supervision of high-risk pregnancy of elderly multigravida    Fetal cardiac anomaly affecting pregnancy, antepartum    Encounter for induction of labor    ,   Information for the patient's mother:  Sampson Fonseca [[id number]]     Medications Prior to Admission   Medication Sig Dispense Refill Last Dose    Prenatal Vit-Fe Fumarate-FA (PRENATAL MULTIVITAMIN W/IRON) 27-0.8 MG tablet Take 1 tablet by mouth daily 90 tablet 3 4/23/2024    calcium carbonate (OS-KODY) 1500 (600 Ca) MG tablet Take 1 tablet (600 mg) by mouth 2 times daily  (Patient not taking: Reported on 4/10/2024) 180 tablet 4     [DISCONTINUED] acetaminophen (TYLENOL) 500 MG tablet Take 1-2 tablets (500-1,000 mg) by mouth every 6 hours as needed for mild pain (Patient not taking: Reported on 4/10/2024) 90 tablet 1           Relevant maternal medications:   none    Maternal risk factors:  Smoking     Admission Education given:  []Admission packet  []Kangaroo care  []Benefits of breast milk  []How breast milk is made  [x]Stages of milk production  []Milk supply/ goal volumes  [x]Hand expression  []Hands-on pumping  [x]Collecting, labeling, transporting milk  [x]Cleaning, sanitizing pump parts  []Storage of milk  []Benefits and use of pasteurized donor human milk    Assisted with pumping, Sampson has very large nipples, size 24 / 21 mm are too small. Tried the 27 mm and they fit only on the right breast, rubbing on the left breast so increased the flange size on the left breast to a size 30 mm.    Plan for size 27 mm flange on right breast (this may need to be adjusted up to a size 30 mm at later time d/t nipple expansion)  Plan for size 30 mm flange size on left breast       Rosana Frank RN, IBCLC   Lactation Consultant  Buddy: [facility] [phone]  Office: [phone]

## 2024-04-26 NOTE — PROGRESS NOTES
"  Anesthesia Post-Partum Follow-Up Note After Vaginal Delivery with Epidural    Patient: Sampson Fonseca    Patient location: Post-partum floor    Anesthesia type: Epidural    Subjective  Sampson Fonseca does not complain of pruritis at this time. She denies weakness, denies paresthesia, denies difficulties breathing or voiding, denies nausea or vomiting, and denies headache. She is able to ambulate and tolerates regular diet. Patient endorsed a positive anesthesia experience.    Objective  Respiratory Function (RR / SpO2 / Airway Patency): Satisfactory  Cardiac Function (HR / Rhythm / BP): Satisfactory  Strength and sensation lower extremities: Normal  Site of epidural insertion: No signs of infection or inflammation    Most recent vitals  BP (P) 114/80 (BP Location: Right arm, Patient Position: Semi-Jones's, Cuff Size: Adult Regular)   Pulse (P) 92   Temp (P) 36.8  C (98.2  F) (Oral)   Resp (P) 18   Ht 1.499 m (4' 11\")   Wt 72.4 kg (159 lb 9.6 oz)   LMP 2023   SpO2 100%   Breastfeeding Unknown   BMI 32.24 kg/m      Assessment and plan  Sampson Fonseca is a 38 year old female  post-partum #1 s/p vaginal delivery. An epidural catheter was successfully inserted and provided labor analgesia via PCEA pump infusing bupivacaine and fentanyl. The patient delivered via  and the epidural catheter was removed immediately thereafter by the L&D RN.     At this time, there is no evidence of adverse side effects associated with the insertion or removal of the epidural catheter. If the patient develops new lower extremity paresis or paresthesias, or if there are concerns regarding the insertion site of the catheter, please reach out to the anesthesia department OB division [phone]    Thank you for including us in the care for this patient.    Doug Srinivasan MD  Anesthesiology Resident      "

## 2024-04-26 NOTE — PROGRESS NOTES
"OBGYN Attending Postpartum Progress Note    S: Pain is controlled. Lochia is normal. Ambulating without difficulty. No difficulty voiding. Breastpump in the room, no concerns. Visit facilitated with Jose Manuel over the phone . Baby in surgery, will be in CVICU for DORV.    O:  BP (P) 114/80 (BP Location: Right arm, Patient Position: Semi-Jones's, Cuff Size: Adult Regular)   Pulse (P) 92   Temp (P) 98.2  F (36.8  C) (Oral)   Resp (P) 18   Ht 1.499 m (4' 11\")   Wt 72.4 kg (159 lb 9.6 oz)   LMP 2023   SpO2 100%   Breastfeeding Unknown   BMI 32.24 kg/m      Gen: NAD  Abdomen: soft, nontender, nondistended, fundus firm and below umbilicus  Incision: clean/dry/intact  Ext: NT/trace edema    A/P:  Sampson D Mo is a  who is PPD#1 after  c/b shoulder dystocia. She is doing well.    Pain:  well-managed with current regimen    Routine PP cares:   -Rh POS/RI/Tdap UTD  -stool softeners and pericares as needed  -breastpumping is going well, lactation has already fitted flanges with Sampson  -contraception: undecided    3. Acute Blood Loss Anemia:   -Hgb 13.1 >  > 10.9. AFVSS. Will not discharge home with iron as Hgb > 10.    Anticipate discharge PPD#2.    Carlee French MD, MSCI    [facility]/OB[facility]N  24  "

## 2024-04-26 NOTE — LACTATION NOTE
This note was copied from a baby's chart.  Lactation Follow Up Note    Reason for visit/ call/ message:  Assess need for pump as Sampson is discharging from the hospital today.   Mauritian phone encounter used for this encounter    Supply:  Pumping 3-4 times per day and getting no milk to a few drops (about 28 hours postpartum)    Significant changes (medications, equipment, comfort, etc):  Has a pump at home. Unsure of the name but it sounds like a Pump In Style  Reviewed benefits of using a Symphony pump when  from infant. Family declines at this time.  Encouraged Sampson to leave her Symphony pump parts in [facility]'s CVICU room and to use the Symphony pump when at the hospital    Education given:  Pumping frequency, benefits of logging pumping times and volumes  Stages of milk production  Hands on pumping  Benefits of a hospital grade pump  Inpatient lactation support    Plan:  Sampson is discharging to home today. She will use her home pump to pump at home, but encouraged Symphony pump when at the hospital. Encouraged her to leave her Symphony parts in [facility]'s CVICU room so they are here if/when needed.  Ordered Symphony pump for CVICU room from [facility]  Parents had questions about infant's status, care and IPad video visiting from home. Encouraged them to check in with bedside RN when they go to visit prior to Sampson going home. Message sent to bedside RN to let her know family has questions  Lactation will follow up with family again on day 5 of life. Please contact us if family has lactation needs prior to this        Adrianna Villeda, RN, IBCLC   Lactation Consultant  Buddy: [facility] [phone]  Office: [phone]

## 2024-04-26 NOTE — PROGRESS NOTES
"Received order for maternal child health SW to see for \"CVICU admission\".    CVICU SW team to follow this family for assessment and supportive interventions.    DANIEL [facility]  Clinical   Maternal Child Health  Voicemail:  [phone]  Reachable via [email or web address]        "

## 2024-05-06 ENCOUNTER — MEDICAL CORRESPONDENCE (OUTPATIENT)
Dept: HEALTH INFORMATION MANAGEMENT | Facility: CLINIC | Age: 39
End: 2024-05-06

## 2024-05-06 ENCOUNTER — OFFICE VISIT (OUTPATIENT)
Dept: FAMILY MEDICINE | Facility: CLINIC | Age: 39
End: 2024-05-06
Payer: COMMERCIAL

## 2024-05-06 VITALS
DIASTOLIC BLOOD PRESSURE: 91 MMHG | OXYGEN SATURATION: 99 % | RESPIRATION RATE: 20 BRPM | HEIGHT: 59 IN | WEIGHT: 149.8 LBS | HEART RATE: 71 BPM | SYSTOLIC BLOOD PRESSURE: 138 MMHG | TEMPERATURE: 98.1 F | BODY MASS INDEX: 30.2 KG/M2

## 2024-05-06 DIAGNOSIS — R03.0 ELEVATED BLOOD PRESSURE READING WITHOUT DIAGNOSIS OF HYPERTENSION: ICD-10-CM

## 2024-05-06 DIAGNOSIS — K64.4 EXTERNAL HEMORRHOIDS: ICD-10-CM

## 2024-05-06 LAB
ALBUMIN MFR UR ELPH: 17.5 MG/DL
CREAT UR-MCNC: 142 MG/DL
ERYTHROCYTE [DISTWIDTH] IN BLOOD BY AUTOMATED COUNT: 11.5 % (ref 10–15)
HCT VFR BLD AUTO: 37.6 % (ref 35–47)
HGB BLD-MCNC: 13.4 G/DL (ref 11.7–15.7)
MCH RBC QN AUTO: 30.7 PG (ref 26.5–33)
MCHC RBC AUTO-ENTMCNC: 35.6 G/DL (ref 31.5–36.5)
MCV RBC AUTO: 86 FL (ref 78–100)
PLATELET # BLD AUTO: 323 10E3/UL (ref 150–450)
PROT/CREAT 24H UR: 0.12 MG/MG CR (ref 0–0.2)
RBC # BLD AUTO: 4.36 10E6/UL (ref 3.8–5.2)
WBC # BLD AUTO: 7.8 10E3/UL (ref 4–11)

## 2024-05-06 PROCEDURE — 84156 ASSAY OF PROTEIN URINE: CPT

## 2024-05-06 PROCEDURE — 99214 OFFICE O/P EST MOD 30 MIN: CPT | Mod: 24

## 2024-05-06 PROCEDURE — 85027 COMPLETE CBC AUTOMATED: CPT

## 2024-05-06 PROCEDURE — 80053 COMPREHEN METABOLIC PANEL: CPT

## 2024-05-06 PROCEDURE — 36415 COLL VENOUS BLD VENIPUNCTURE: CPT

## 2024-05-06 PROCEDURE — 84443 ASSAY THYROID STIM HORMONE: CPT

## 2024-05-06 RX ORDER — ADHESIVE BANDAGE 3/4"
BANDAGE TOPICAL
Qty: 1 EACH | Refills: 0 | Status: SHIPPED | OUTPATIENT
Start: 2024-05-06 | End: 2024-05-07

## 2024-05-06 RX ORDER — BENZOCAINE/MENTHOL 6 MG-10 MG
LOZENGE MUCOUS MEMBRANE 2 TIMES DAILY
Qty: 60 G | Refills: 1 | Status: SHIPPED | OUTPATIENT
Start: 2024-05-06

## 2024-05-06 NOTE — PROGRESS NOTES
Physician Attestation   I, Reggie Amaro MD, saw this patient and agree with the findings and plan of care as documented in the note.      Items personally reviewed/procedural attestation: vitals.    Reggie Amaro MD

## 2024-05-06 NOTE — PATIENT INSTRUCTIONS
Nice to see you today!    Here's what we talked about:  - You can  the hemorrhoid cream from the pharmacy.  - You can soak in warm water/a sitz bath to help your hemorrhoid, up to a few times/day.  - Make sure to take your stool softeners, eat fiber, and drink plenty of water to keep your stool soft.

## 2024-05-06 NOTE — PROGRESS NOTES
DATE:  2024  CHIEF COMPLAINT:    Chief Complaint   Patient presents with    Post Partum Exam     Pp             SUBJECTIVE       Sampson D Mo is a  Female who is 38 years old with a PMHx significant for:     Patient Active Problem List   Diagnosis    Language barrier, cultural differences    Tobacco abuse    Betel deposit on teeth    Sebaceous cyst    Migraine    AMA (advanced maternal age) multigravida 35+    Supervision of high-risk pregnancy of elderly multigravida    Fetal cardiac anomaly affecting pregnancy, antepartum    Encounter for induction of labor     confirmed and updated problem list    She is Here today for her 2 week post-partum visit. Delivery Date: 24    She had a induced vaginal delivery of viable Baby girl who weighed 7 lbs 6 oz, with Shoulder Dystocia complications. There was no laceration.      Current Concerns: patient feels a bump near her anal area that feels burning/aching when she wipes. No blood in stool. Has 3-4 bowel movements daily; stool is soft. Is taking stool softener.    Denies any change in chronic headache, no vision changes, chest pain, dyspnea, RUQ pain, or new/worsening leg swelling (leg swelling is actually improving).    Post Partum ROS:  1) How are your feeding your baby and do you have any concerns about this? Baby is currently in NICU due to congenital heart defect. Patient is pumping and bringing breast milk to hospital.    3) Post-partum Vaginal Bleeding: YES- decreasing since hospital discharge; just a little bit    4) Vaginal Tissue: healing well, no complaints. No laceration repairs.    5)Hemorrhoids: YES- external hemorrhoid seen on exam    6) Returning to [place]:   Patient has not had intercourse since delivery.    7)Abdominal pain: No    8) Contraception:  What form of contraception is desired? Nexplanon    9) Sleep/stress/support/caring for baby: sleeping well. Mood is good.    10) Postpartum depression screening was negative.     A Jose Manuel  " was used for this visit.        OBJECTIVE     Physical Exam:  Vitals:    24 1312 24 1317 24 1347   BP: (!) 154/98 (!) 155/94 (!) 138/91   Pulse: 71     Resp: 20     Temp: 98.1  F (36.7  C)     TempSrc: Tympanic     SpO2: 99%     Weight: 67.9 kg (149 lb 12.8 oz)     Height: 1.499 m (4' 11\")       Body mass index is 30.26 kg/m .    Gen:  NAD  HEENT: mucous membranes moist, PERRL, normal conjunctivae and sclerae  Neck: supple without lymphadenopathy. Possible goiter  CV:  RRR  - no murmurs, rubs, or gallups. No LE edema bilaterally.  Pulm:  CTAB, no wheezes/rales/rhonchi  ABD: soft, nontender, no masses, no rebound, BS intact throughout  : 1 external hemorrhoid noted at 12 o'clock position; not thrombosed in appearance  Psych: Mood and affect appropriate    LABS:  Results for orders placed or performed in visit on 24 (from the past 24 hour(s))   CBC with platelets   Result Value Ref Range    WBC Count 7.8 4.0 - 11.0 10e3/uL    RBC Count 4.36 3.80 - 5.20 10e6/uL    Hemoglobin 13.4 11.7 - 15.7 g/dL    Hematocrit 37.6 35.0 - 47.0 %    MCV 86 78 - 100 fL    MCH 30.7 26.5 - 33.0 pg    MCHC 35.6 31.5 - 36.5 g/dL    RDW 11.5 10.0 - 15.0 %    Platelet Count 323 150 - 450 10e3/uL      Last PAP: No results found for: \"PAP\"    ASSESSMENT AND PLAN     Sampson Fonseca is a  Female who is 38 year old years old with a PMHx significant for:     Patient Active Problem List   Diagnosis    Language barrier, cultural differences    Tobacco abuse    Betel deposit on teeth    Sebaceous cyst    Migraine    AMA (advanced maternal age) multigravida 35+    Supervision of high-risk pregnancy of elderly multigravida    Fetal cardiac anomaly affecting pregnancy, antepartum    Encounter for induction of labor       1. 2 week post-partum visit: Doing well.    2. Post- partum depression screen:negative.     3. Breastfeeding problems or questions: No, pumping.    4. Pap smear was not done at this time - will wait " until 4 or 6 week PP visit.    5. Immunizations given: declines COVID. Does not qualify for pneumococcal vaccine - has never been vaccinated, but does not have any risk conditions according to CDC that would require her to get pneumococcal vaccine before age 65.    6. Contraception Plan:Nexplanon. Will schedule this appointment.    7. History of GDM: no    8. History of Hypertensive of pregnancy disorders: no    9.  Caring for baby: Mom states she is connected with WIC if needed and has adequate supplies for taking care of her baby.    10. Follow-up: RTC in 2 weeks for 4 week PP visit, 4 weeks for 6 week PP visit; as soon as convenient for patient for Nexplanon placement.    Sampson was seen today for post partum exam.    Diagnoses and all orders for this visit:    Routine postpartum follow-up  Doing well mood-wise and symptom-wise; vaginal bleeding decreasing and no abdominal pain. However, has new elevated BP - had a slightly elevated BP during pregnancy per chart review but no history of pre-E and was not taking ASA during pregnancy. Pumping breastmilk to bring to baby at NICU. Nexplanon placement appointment made. Pap smear to be done at future visit. Declines COVID shot; not due for pneumococcal vaccine.    Elevated blood pressure reading without diagnosis of hypertension  BP still 138/91 on recheck; SBP 150s at first. Asymptomatic. Discussed red flag symptoms to watch out for. Will get preliminary pre-E/HELLP labs (adding TSH due to potential goiter on exam) and ordered home BP monitor for patient to take daily blood pressures at home. Will have OB RN call patient at the end of this week to evaluate readings. If patient herself sees two BP readings >140/90 (or if RN finds that patient has had elevated readings), instructed patient to call clinic and will start BP medication. RTC in 2 weeks or sooner depending on if she develops pre-E symptoms.  -     Comprehensive metabolic panel; Future  -     CBC with platelets;  Future  -     TSH with free T4 reflex; Future  -     Protein  random urine; Future  -     Cancel: Home Blood Pressure Monitor Order for DME - ONLY FOR DME  -     Blood Pressure Monitoring (BLOOD PRESSURE CUFF) MISC; Please check blood pressure once daily.    External hemorrhoids  Does not appear thrombosed; just 1 hemorrhoid noted on visual rectal exam. Non-bleeding; no BRBPR per patient report.  -     hydrocortisone (CORTAID) 1 % external cream; Apply topically 2 times daily Do not use for more than 1 week continuously at a time. Take a break for 1-2 weeks between applications if using it for 1 week at a time.  - Discussed sitz baths, continuing to take stool softener, fluid intake and fiber intake for soft stools         Ana M Pace MD    Patient was precepted with and seen by Dr. Amaro.    [address] [address] [phone]

## 2024-05-06 NOTE — Clinical Note
Sampson Lopez had elevated blood pressures at clinic today which is new for her; she's asymptomatic. I ordered her a BP cuff to take her BP at home once daily - could you call her this Friday (5/10) and check how her readings have been? She's been instructed to let us know if she had two separate readings >140/90 because we will need to start medication. I also instructed her to watch out for pre-E symptoms. Let me know if you have any questions; thanks!   Ana M

## 2024-05-06 NOTE — PROGRESS NOTES
Critical Vital Report    Did patient have a critical vital during today's visit? Yes  Which vital is reporting as critical?: Blood Pressure    I personally notified the following: Provider    Action(s) taken: I left room and notified provider personally    Anne Chatman MA

## 2024-05-07 ENCOUNTER — LACTATION ENCOUNTER (OUTPATIENT)
Dept: INTENSIVE CARE | Facility: CLINIC | Age: 39
End: 2024-05-07

## 2024-05-07 DIAGNOSIS — R03.0 ELEVATED BLOOD PRESSURE READING WITHOUT DIAGNOSIS OF HYPERTENSION: ICD-10-CM

## 2024-05-07 LAB
ALBUMIN SERPL BCG-MCNC: 4 G/DL (ref 3.5–5.2)
ALP SERPL-CCNC: 93 U/L (ref 40–150)
ALT SERPL W P-5'-P-CCNC: 18 U/L (ref 0–50)
ANION GAP SERPL CALCULATED.3IONS-SCNC: 9 MMOL/L (ref 7–15)
AST SERPL W P-5'-P-CCNC: 16 U/L (ref 0–45)
BILIRUB SERPL-MCNC: 0.2 MG/DL
BUN SERPL-MCNC: 14.3 MG/DL (ref 6–20)
CALCIUM SERPL-MCNC: 9.3 MG/DL (ref 8.6–10)
CHLORIDE SERPL-SCNC: 102 MMOL/L (ref 98–107)
CREAT SERPL-MCNC: 0.76 MG/DL (ref 0.51–0.95)
DEPRECATED HCO3 PLAS-SCNC: 25 MMOL/L (ref 22–29)
EGFRCR SERPLBLD CKD-EPI 2021: >90 ML/MIN/1.73M2
GLUCOSE SERPL-MCNC: 88 MG/DL (ref 70–99)
POTASSIUM SERPL-SCNC: 4.1 MMOL/L (ref 3.4–5.3)
PROT SERPL-MCNC: 7.1 G/DL (ref 6.4–8.3)
SODIUM SERPL-SCNC: 136 MMOL/L (ref 135–145)
TSH SERPL DL<=0.005 MIU/L-ACNC: 0.9 UIU/ML (ref 0.3–4.2)

## 2024-05-07 NOTE — RESULT ENCOUNTER NOTE
Called patient with phone  - pre-E labs normal. Pharmacy won't cover dispensing BP cuff - will work to get it to patient through [facility]/DME.

## 2024-05-08 ENCOUNTER — TELEPHONE (OUTPATIENT)
Dept: FAMILY MEDICINE | Facility: CLINIC | Age: 39
End: 2024-05-08
Payer: COMMERCIAL

## 2024-05-08 ENCOUNTER — PATIENT OUTREACH (OUTPATIENT)
Dept: CARE COORDINATION | Facility: CLINIC | Age: 39
End: 2024-05-08
Payer: COMMERCIAL

## 2024-05-08 NOTE — LACTATION NOTE
"This note was copied from a baby's chart.  Lactation Follow Up Note    Reason for visit/ call/ message:  Infant 12 days old, first time able to catch family at bedside despite multiple attempts (last seen by LC when infant was 24 hours of age).    Supply:  4 -5 ounces total per day - bringing this into hospital every day for her infant. Using her pump at home (believe she has a pump-n-style) every day.    Significant changes (medications, equipment, comfort, etc):  none    Skin to skin/ nuzzling/ latching:  Was holding infant when entered room! Not doing skin-to-skin as this might not yet be possible    Education given:  Lactation services in hospital / support as she desires  Little opportunity for education as family present and Sampson did not show interest or concern for LC consult and visit at this time.    Plan:  Did not make plan, Sampson didn't want to talk. Her family was present and they translated English. Offered support and presence for opportunity for questions. Sampson had no questions and per her family \"everything going very well, no concerns and no questions about pumping, she is pumping at home every day.\"  Encouraged Sampson to reach out to lactation for support / guidance / as she deems necessary.   Explained to bedside RN after she was able to show me the milk in the freezer that the goal is 24 ounces / day by 2 weeks out, however we are glad that she is pumping any amount and encouraged the bedside RN to make sure Sampson knows volume goals if she asks!        Rosana Frank, RN, IBCLC   Lactation Consultant  Buddy: [facility] [phone]  Office: [phone]      "

## 2024-05-08 NOTE — TELEPHONE ENCOUNTER
Called pt with Jose Manuel (I) to see if she has received her home BP monitor and she states that she has not yet.  Told her that rx was faxed to [facility] and an email will be sent now to see if it can be delivered.  Confirmed address in chart and pt states that it can be delivered anytime.    Emailed [facility]./Desiree Mccollum RN BSN

## 2024-05-08 NOTE — TELEPHONE ENCOUNTER
He, MD Veda Viramontes, JASMIN Chew Tee had elevated blood pressures at clinic today which is new for her; she's asymptomatic. I ordered her a BP cuff to take her BP at home once daily - could you call her this Friday (5/10) and check how her readings have been? She's been instructed to let us know if she had two separate readings >140/90 because we will need to start medication. I also instructed her to watch out for pre-E symptoms. Let me know if you have any questions; thanks!      Ana M

## 2024-05-08 NOTE — PROGRESS NOTES
Care Coordination:     [facility] [place]: [id number]  Device:  Home Blood Pressure Monitor   Vendor: [employer] [place]  Fax: [phone]      John Sullivan Sr.   Care Coordination  [address] [address]  [email or web address]  [email or web address]   Office: [phone] Direct: [phone]  [facility]

## 2024-05-10 ENCOUNTER — OFFICE VISIT (OUTPATIENT)
Dept: FAMILY MEDICINE | Facility: CLINIC | Age: 39
End: 2024-05-10
Payer: COMMERCIAL

## 2024-05-10 ENCOUNTER — TELEPHONE (OUTPATIENT)
Dept: FAMILY MEDICINE | Facility: CLINIC | Age: 39
End: 2024-05-10

## 2024-05-10 ENCOUNTER — DOCUMENTATION ONLY (OUTPATIENT)
Dept: FAMILY MEDICINE | Facility: CLINIC | Age: 39
End: 2024-05-10

## 2024-05-10 VITALS
HEART RATE: 99 BPM | BODY MASS INDEX: 29.35 KG/M2 | HEIGHT: 59 IN | TEMPERATURE: 97.7 F | SYSTOLIC BLOOD PRESSURE: 133 MMHG | DIASTOLIC BLOOD PRESSURE: 96 MMHG | RESPIRATION RATE: 20 BRPM | OXYGEN SATURATION: 96 % | WEIGHT: 145.6 LBS

## 2024-05-10 DIAGNOSIS — G89.29 CHRONIC NONINTRACTABLE HEADACHE, UNSPECIFIED HEADACHE TYPE: ICD-10-CM

## 2024-05-10 DIAGNOSIS — R51.9 CHRONIC NONINTRACTABLE HEADACHE, UNSPECIFIED HEADACHE TYPE: ICD-10-CM

## 2024-05-10 LAB
ALBUMIN MFR UR ELPH: 7.4 MG/DL
ALBUMIN SERPL-MCNC: 3.3 G/DL (ref 3.4–5)
ALBUMIN UR-MCNC: NEGATIVE MG/DL
ALP SERPL-CCNC: 87 U/L (ref 40–150)
ALT SERPL W P-5'-P-CCNC: 24 U/L (ref 0–50)
ANION GAP SERPL CALCULATED.3IONS-SCNC: 11 MMOL/L (ref 3–14)
APPEARANCE UR: CLEAR
AST SERPL W P-5'-P-CCNC: 18 U/L (ref 0–45)
BACTERIA #/AREA URNS HPF: ABNORMAL /HPF
BASOPHILS # BLD AUTO: 0.1 10E3/UL (ref 0–0.2)
BASOPHILS NFR BLD AUTO: 1 %
BILIRUB SERPL-MCNC: 0.7 MG/DL (ref 0.2–1.3)
BILIRUB UR QL STRIP: NEGATIVE
BUN SERPL-MCNC: 14 MG/DL (ref 7–30)
CALCIUM SERPL-MCNC: 9.7 MG/DL (ref 8.5–10.1)
CHLORIDE BLD-SCNC: 99 MMOL/L (ref 94–109)
CO2 SERPL-SCNC: 27 MMOL/L (ref 20–32)
COLOR UR AUTO: YELLOW
CREAT SERPL-MCNC: 0.9 MG/DL (ref 0.52–1.04)
CREAT UR-MCNC: 41.6 MG/DL
EGFRCR SERPLBLD CKD-EPI 2021: 84 ML/MIN/1.73M2
EOSINOPHIL # BLD AUTO: 0.4 10E3/UL (ref 0–0.7)
EOSINOPHIL NFR BLD AUTO: 6 %
ERYTHROCYTE [DISTWIDTH] IN BLOOD BY AUTOMATED COUNT: 11.2 % (ref 10–15)
GLUCOSE BLD-MCNC: 106 MG/DL (ref 70–99)
GLUCOSE UR STRIP-MCNC: NEGATIVE MG/DL
HCT VFR BLD AUTO: 39.7 % (ref 35–47)
HGB BLD-MCNC: 14.1 G/DL (ref 11.7–15.7)
HGB UR QL STRIP: ABNORMAL
IMM GRANULOCYTES # BLD: 0 10E3/UL
IMM GRANULOCYTES NFR BLD: 0 %
KETONES UR STRIP-MCNC: NEGATIVE MG/DL
LEUKOCYTE ESTERASE UR QL STRIP: ABNORMAL
LYMPHOCYTES # BLD AUTO: 2.1 10E3/UL (ref 0.8–5.3)
LYMPHOCYTES NFR BLD AUTO: 30 %
MCH RBC QN AUTO: 30.2 PG (ref 26.5–33)
MCHC RBC AUTO-ENTMCNC: 35.5 G/DL (ref 31.5–36.5)
MCV RBC AUTO: 85 FL (ref 78–100)
MONOCYTES # BLD AUTO: 0.5 10E3/UL (ref 0–1.3)
MONOCYTES NFR BLD AUTO: 7 %
NEUTROPHILS # BLD AUTO: 3.9 10E3/UL (ref 1.6–8.3)
NEUTROPHILS NFR BLD AUTO: 56 %
NITRATE UR QL: NEGATIVE
PH UR STRIP: 6 [PH] (ref 5–8)
PLATELET # BLD AUTO: 344 10E3/UL (ref 150–450)
POTASSIUM BLD-SCNC: 3.4 MMOL/L (ref 3.4–5.3)
PROT SERPL-MCNC: 7.6 G/DL (ref 6.8–8.8)
PROT/CREAT 24H UR: 0.18 MG/MG CR (ref 0–0.2)
RBC # BLD AUTO: 4.67 10E6/UL (ref 3.8–5.2)
RBC #/AREA URNS AUTO: ABNORMAL /HPF
SODIUM SERPL-SCNC: 137 MMOL/L (ref 135–145)
SP GR UR STRIP: <=1.005 (ref 1–1.03)
SQUAMOUS #/AREA URNS AUTO: ABNORMAL /LPF
UROBILINOGEN UR STRIP-ACNC: 0.2 E.U./DL
WBC # BLD AUTO: 6.9 10E3/UL (ref 4–11)
WBC #/AREA URNS AUTO: ABNORMAL /HPF

## 2024-05-10 PROCEDURE — 36415 COLL VENOUS BLD VENIPUNCTURE: CPT

## 2024-05-10 PROCEDURE — 84156 ASSAY OF PROTEIN URINE: CPT

## 2024-05-10 PROCEDURE — 80053 COMPREHEN METABOLIC PANEL: CPT

## 2024-05-10 PROCEDURE — 81001 URINALYSIS AUTO W/SCOPE: CPT

## 2024-05-10 PROCEDURE — 99214 OFFICE O/P EST MOD 30 MIN: CPT | Mod: 24

## 2024-05-10 PROCEDURE — 85025 COMPLETE CBC W/AUTO DIFF WBC: CPT

## 2024-05-10 RX ORDER — IBUPROFEN 200 MG
600 TABLET ORAL EVERY 6 HOURS PRN
Qty: 30 TABLET | Refills: 0 | Status: SHIPPED | OUTPATIENT
Start: 2024-05-10

## 2024-05-10 RX ORDER — NIFEDIPINE 30 MG
30 TABLET, EXTENDED RELEASE ORAL DAILY
Qty: 30 TABLET | Refills: 0 | Status: SHIPPED | OUTPATIENT
Start: 2024-05-10 | End: 2024-05-16

## 2024-05-10 NOTE — PROGRESS NOTES
Assessment & Plan     Sampson was seen today for follow up.    Diagnoses and all orders for this visit:    Postpartum hypertension  Preeclampsia labs from office visit on 5/6/2024 were reassuring.  AST and ALT were within the normal range.  Platelet count was normal at 323 and a urine protein to creatinine ratio was reassuring at 0.12.    -Patient remains hypertensive at visit today with initial reading 149/98 and repeat 133/96.  Labs obtained today in clinic 5/10/2024 were negative for proteinuria on UA.   -CBC obtained today in clinic was reassuring, with a normal platelet count.  -AST and ALT today were within the normal range today as well.  -Physical exam was reassuring.  Peripheral vision intact.  Lower extremity reflexes plus 2 out of 4 at patella and Achilles bilaterally.  No clonus bilaterally in the lower extremities.  -Urine protein creat ratio is still pending.  -Given the above constellation of reassuring labs and physical exam findings, preeclampsia is unlikely at this time.  -Discussed with patient that I will start her on a daily low-dose of nifedipine which can help reduce her blood pressure.  We discussed extensively return precautions as well as ED precautions including but not limited to intractable headache, abdominal pain, flank pain, changes in vision.  Patient verbalized clear understanding and agreement to this treatment plan and had no further questions or concerns at this time.  -     Protein  random urine  -     CBC with Platelets & Differential  -     Comprehensive metabolic panel  -     Urine Macroscopic with reflex to Microscopic  -     Urine Microscopic Exam  -     NIFEdipine ER (ADALAT CC) 30 MG 24 hr tablet; Take 1 tablet (30 mg) by mouth daily      Chronic nonintractable headache, unspecified headache type  -Discussed with patient that I can send in a short course of as needed ibuprofen to her pharmacy on file which may be beneficial at lowering her blood pressure as well if her  "blood pressure is elevated secondary to pain.  -     ibuprofen (ADVIL/MOTRIN) 200 MG tablet; Take 3 tablets (600 mg) by mouth every 6 hours as needed for pain      BMI  Estimated body mass index is 29.39 kg/m  as calculated from the following:    Height as of this encounter: 1.499 m (4' 11.02\").    Weight as of this encounter: 66 kg (145 lb 9.6 oz).       Return in about 1 week (around 2024).        Subjective   Sampson is a 38 year old, presenting for the following health issues:  Follow Up (bp)    HPI   -Patient is a  status post  on 2024.  Presents today for follow-up regarding elevated blood pressure reading.  Patient was reported to have taken her blood pressure on 2024 at home and had a reading of 162/116 and on the evening of 2024 with a reading of 135/110.  Blood pressure today is 133/96. She reports a current headache, but she states that this headache is similar in nature to her regular headaches.  She states the headache is present on the top of her head centrally, in the location that it is usually present.  She reports that she took Tylenol today for her headache and she reports that this improved her headache. Headache location is on the top of her head. She denies any visual changes.  Denies nausea, vomiting, shortness of breath, chest pain, abdominal pain, flank pain, changes in bowel or bladder patterns, changes in vision, neurological deficits.  She denies any additional concerns at this time.      Review of Systems  Constitutional, HEENT, cardiovascular, pulmonary, gi and gu systems are negative, except as otherwise noted.  ROS reviewed, see HPI for pertinent positives and negatives.  Joye Hartmann, DO        Objective    BP (!) 133/96   Pulse 99   Temp 97.7  F (36.5  C) (Tympanic)   Resp 20   Ht 1.499 m (4' 11.02\")   Wt 66 kg (145 lb 9.6 oz)   LMP 2023   SpO2 96%   Breastfeeding Yes   BMI 29.39 kg/m    Body mass index is 29.39 kg/m .  Physical " Exam  Constitutional:       General: She is not in acute distress.     Appearance: She is normal weight. She is not toxic-appearing.   HENT:      Head: Normocephalic and atraumatic.      Right Ear: External ear normal.      Left Ear: External ear normal.      Nose: Nose normal.      Mouth/Throat:      Mouth: Mucous membranes are moist.   Eyes:      Pupils: Pupils are equal, round, and reactive to light.      Comments: -Intact peripheral vision to testing bilaterally.   Cardiovascular:      Rate and Rhythm: Normal rate and regular rhythm.      Heart sounds: No murmur heard.     No friction rub. No gallop.   Pulmonary:      Effort: Pulmonary effort is normal. No respiratory distress.      Breath sounds: No wheezing or rales.   Abdominal:      General: There is no distension.      Tenderness: There is no abdominal tenderness.      Comments: -Negative CVA tenderness bilaterally  -No abdominal pain to soft or deep palpation in all 4 quadrants.   Skin:     Findings: No rash.   Neurological:      General: No focal deficit present.      Mental Status: She is alert and oriented to person, place, and time.   Psychiatric:         Mood and Affect: Mood normal.         Behavior: Behavior normal.           Precepted with Dr. Shimon Dexter MD    Signed Electronically by: Joey Hartmann DO

## 2024-05-10 NOTE — TELEPHONE ENCOUNTER
Spoke with pt- Jose Manuel DOBBS) regarding BP. Pt states she still has not received her BP cuff from [facility]. However, she reports she borrowed a BP cuff and took some readings yesterday. The readings were 160/116, 140/110 and 135/110. Additionally, pt is reporting a headache that is ongoing ranging from 5/10 - 8/10 pain. Pt denies blurry vision, nausea or abdominal pain. No swelling of extremities noted. Advised pt that Dr Pace will be consulted and pt will receive a call back with plan of care.   Willy RN, BSN

## 2024-05-10 NOTE — PATIENT INSTRUCTIONS
-Have sent in a medication to help reduce your blood pressure.  I recommend you follow-up within the next 7 days for repeat blood pressure evaluation, or sooner if concerns arise.  -Recommend you proceed to the emergency department if develop an intractable headache, abdominal pain, leg pain, vision loss, or any neurological deficits.

## 2024-05-13 ENCOUNTER — PATIENT OUTREACH (OUTPATIENT)
Dept: CARE COORDINATION | Facility: CLINIC | Age: 39
End: 2024-05-13
Payer: COMMERCIAL

## 2024-05-13 NOTE — TELEPHONE ENCOUNTER
"Writer used [employer] [place]  to talk with Pt. We talked about her future appts:   5/16/2024 at 2:30 PM  5/20/2024 2:30 PM   6/3/2024 2:10 PM  6/17/2024 2:10 PM    Patient says that clinic will usually schedule rides for her.  Writer explained to Pt that writer will request for the 5/16 appt.   Writer informed Pt that writer would like to meet Pt after her visit with PCP of the 16th. Writer will teach Pt how to request for her own rides for the future appts. Writer asked if her  would like to join the session. Pt reports that  has to be at work at that time. Pt agrees to meet with writer as discussed.    [employer] secured ride for 5/16/2024.    [employer] Trip Pickup trip ID [id number]  [employer] Return trip ID [id number] with \" at will call \" after appointment completes.  Writer alerted PCP of our meeting after their appointment.  "

## 2024-05-16 ENCOUNTER — PATIENT OUTREACH (OUTPATIENT)
Dept: CARE COORDINATION | Facility: CLINIC | Age: 39
End: 2024-05-16

## 2024-05-16 ENCOUNTER — OFFICE VISIT (OUTPATIENT)
Dept: FAMILY MEDICINE | Facility: CLINIC | Age: 39
End: 2024-05-16
Payer: COMMERCIAL

## 2024-05-16 VITALS
WEIGHT: 141.8 LBS | OXYGEN SATURATION: 99 % | SYSTOLIC BLOOD PRESSURE: 128 MMHG | TEMPERATURE: 98.4 F | RESPIRATION RATE: 20 BRPM | HEIGHT: 58 IN | BODY MASS INDEX: 29.77 KG/M2 | DIASTOLIC BLOOD PRESSURE: 88 MMHG | HEART RATE: 102 BPM

## 2024-05-16 LAB
ALBUMIN MFR UR ELPH: 25.2 MG/DL
CREAT UR-MCNC: 220 MG/DL
ERYTHROCYTE [DISTWIDTH] IN BLOOD BY AUTOMATED COUNT: 11 % (ref 10–15)
HCT VFR BLD AUTO: 40.8 % (ref 35–47)
HGB BLD-MCNC: 14.8 G/DL (ref 11.7–15.7)
MCH RBC QN AUTO: 30.5 PG (ref 26.5–33)
MCHC RBC AUTO-ENTMCNC: 36.3 G/DL (ref 31.5–36.5)
MCV RBC AUTO: 84 FL (ref 78–100)
PLATELET # BLD AUTO: 369 10E3/UL (ref 150–450)
PROT/CREAT 24H UR: 0.11 MG/MG CR (ref 0–0.2)
RBC # BLD AUTO: 4.86 10E6/UL (ref 3.8–5.2)
WBC # BLD AUTO: 7.9 10E3/UL (ref 4–11)

## 2024-05-16 PROCEDURE — 85027 COMPLETE CBC AUTOMATED: CPT

## 2024-05-16 PROCEDURE — 36415 COLL VENOUS BLD VENIPUNCTURE: CPT

## 2024-05-16 PROCEDURE — 80053 COMPREHEN METABOLIC PANEL: CPT

## 2024-05-16 PROCEDURE — 99213 OFFICE O/P EST LOW 20 MIN: CPT | Mod: 24

## 2024-05-16 PROCEDURE — 84156 ASSAY OF PROTEIN URINE: CPT

## 2024-05-16 NOTE — PROGRESS NOTES
Assessment & Plan     Postpartum hypertension  Tolerating nifedipine 30 mg daily.  Blood pressure is normal today in clinic at 128/88.  However, patient's blood pressure readings at home are elevated to SBP in 150s at times.  Continues to be asymptomatic with no change to her chronic headache.  As ibuprofen has not been helpful, okay to stop it today.  Pre-E labs have so far been normal.  Given discrepancy between clinic blood pressure reading and home blood pressure readings, will have patient bring in her home blood pressure cuff next time to compare readings.  For now, will double nifedipine dose to 60 mg daily and recheck pre-E labs to make sure they are not worsening.  - NIFEdipine ER (ADALAT CC) 60 MG 24 hr tablet  Dispense: 30 tablet; Refill: 0  - CBC with platelets  - Comprehensive metabolic panel  - Protein  random urine        Return in about 4 days (around 5/20/2024) for Follow up.    Patient precepted with Reggie Amaro MD.    Nicole Braden is a 38 year old, presenting for the following health issues:  RECHECK (Follow-up bp)        5/16/2024     2:27 PM   Additional Questions   Roomed by bola   Accompanied by self         5/16/2024    Information    services provided? Yes   Language Jose Manuel   Type of interpretation provided Face-to-face    name Sanjiv Bravo    Agency [employer]     HPI   Initially seen by me on 5/6 for 2-week postpartum visit.  At that time, had elevated blood pressure reading and pre-E labs were normal.  Later that week, she reported over call that she had elevated blood pressures at home with a borrowed home blood pressure cuff and so had patient return for visit on 5/10.  At that visit, she remained hypertensive and so nifedipine XL 30 mg daily was started.  Pre-E labs were again reassuring and physical exam was reassuring.    Patient reports today that she was able to  her medication and started taking it. No missed doses. No side  "effects. Is using her aunt's home BP machine (measured with arm cuff, not wrist cuff) and wrote down her numbers.    5/12: 127/97,   5/13 :153/107, HR 93  5/14 :150/113,   5/15: 133/112,     No change in chronic headache. Has been taking ibuprofen 600 mg once daily for headache; has not found it helpful. Has not found Tylenol to be that helpful either. Would rather stop medication.    No chest pain, dyspnea, RUQ pain, or new/worsening leg swelling. Continuing to breastfeed.    Also picked up the hydrocortisone cream for hemorrhoids and has been using it and it has been helpful.    Vaginal bleeding postpartum has continued to decrease.    Pertinent positives and negatives as noted in HPI.      Objective    /88 (BP Location: Left arm, Patient Position: Sitting, Cuff Size: Adult Regular)   Pulse 102   Temp 98.4  F (36.9  C) (Oral)   Resp 20   Ht 1.481 m (4' 10.3\")   Wt 64.3 kg (141 lb 12.8 oz)   LMP 06/13/2023   SpO2 99%   Breastfeeding Unknown   BMI 29.33 kg/m    Body mass index is 29.33 kg/m .  Physical Exam   GENERAL: alert and no distress  RESP: lungs clear to auscultation  CV: regular rate and rhythm, normal S1 S2, no peripheral edema  ABDOMEN: soft, nontender, bowel sounds normal  MS: no gross musculoskeletal defects noted  SKIN: no suspicious lesions or rashes  NEURO: Normal strength and tone, mentation intact and speech normal  PSYCH: mentation appears normal, affect normal        Signed Electronically by: Ana M Pace MD    "

## 2024-05-16 NOTE — PATIENT INSTRUCTIONS
Nice to see you today!    Here's what we talked about:  - Start taking TWO of your current BP medications until you run out.  - Keep taking your blood pressures at home and write them down.  - Keep watching for a worsening of your headache or new chest pain, dyspnea, or belly pain - if that happens, go to the hospital.

## 2024-05-16 NOTE — TELEPHONE ENCOUNTER
Writer saw Pt after PCP visit. Pt and writer met in k office. Writer reintroduced self to Pt and reviewed our purpose for this meeting. Writer reviewed with Pt her up coming pcp visit dates and time in clinic. Writer explained the process of requesting transportation rides with [facility].     [place] rep and their phone  worked with Pt to secure rides with [employer] for   5/20/2024 2:30 PM . [employer] picks up 1:30 to 2pm   6/3/2024 2:10 PM     [employer] picks up 1:10pm or 1:45pm   6/17/2024 2:10 PM  This is too far out. Representative asked that Pt call 2 days or more to schedule.    Below are the steps written out for Pt to follow for next time. Instruction given to Pt.  Call [facility] customer service  [phone]  Prompts have no Congolese specific language so wait till prompt is done and then select 4  When rep comes on ask to say Congolese language  Verify herself   Tell rep she is requesting for medical rides  Tell rep it is for primary care appt  Give the rep the appt dates and time and destination  Ride is secure    Writer checked with Pt how she feels. She says that it felt scary because she did not know what to say.  She says that her kids speak English. They can help her.

## 2024-05-17 LAB
ALBUMIN SERPL BCG-MCNC: 4.3 G/DL (ref 3.5–5.2)
ALP SERPL-CCNC: 96 U/L (ref 40–150)
ALT SERPL W P-5'-P-CCNC: 30 U/L (ref 0–50)
ANION GAP SERPL CALCULATED.3IONS-SCNC: 12 MMOL/L (ref 7–15)
AST SERPL W P-5'-P-CCNC: 21 U/L (ref 0–45)
BILIRUB SERPL-MCNC: 0.2 MG/DL
BUN SERPL-MCNC: 13 MG/DL (ref 6–20)
CALCIUM SERPL-MCNC: 9.7 MG/DL (ref 8.6–10)
CHLORIDE SERPL-SCNC: 101 MMOL/L (ref 98–107)
CREAT SERPL-MCNC: 0.67 MG/DL (ref 0.51–0.95)
DEPRECATED HCO3 PLAS-SCNC: 25 MMOL/L (ref 22–29)
EGFRCR SERPLBLD CKD-EPI 2021: >90 ML/MIN/1.73M2
GLUCOSE SERPL-MCNC: 94 MG/DL (ref 70–99)
POTASSIUM SERPL-SCNC: 3.8 MMOL/L (ref 3.4–5.3)
PROT SERPL-MCNC: 7.5 G/DL (ref 6.4–8.3)
SODIUM SERPL-SCNC: 138 MMOL/L (ref 135–145)

## 2024-05-20 ENCOUNTER — OFFICE VISIT (OUTPATIENT)
Dept: FAMILY MEDICINE | Facility: CLINIC | Age: 39
End: 2024-05-20
Payer: COMMERCIAL

## 2024-05-20 VITALS
BODY MASS INDEX: 28.63 KG/M2 | TEMPERATURE: 97.8 F | OXYGEN SATURATION: 100 % | SYSTOLIC BLOOD PRESSURE: 115 MMHG | HEIGHT: 59 IN | RESPIRATION RATE: 16 BRPM | DIASTOLIC BLOOD PRESSURE: 84 MMHG | WEIGHT: 142 LBS | HEART RATE: 103 BPM

## 2024-05-20 DIAGNOSIS — R00.0 SYMPTOMATIC TACHYCARDIA: ICD-10-CM

## 2024-05-20 PROCEDURE — G2211 COMPLEX E/M VISIT ADD ON: HCPCS

## 2024-05-20 PROCEDURE — 93246 EXT ECG>7D<15D RECORDING: CPT

## 2024-05-20 PROCEDURE — 99214 OFFICE O/P EST MOD 30 MIN: CPT | Mod: 24

## 2024-05-20 NOTE — PATIENT INSTRUCTIONS
Nice to see you today!    Here's what we talked about:  - Keep taking nifedipine 60 mg daily - same dose of the medication.  - Keep checking your blood pressure at home until you come see me on 6/3 - we can maybe stop after that.  - If you start developing any new symptoms of high blood pressure - headache worse/different than your normal headache, changes in vision, chest pain, trouble breathing, belly pain, or new or worsening leg swelling, please go to the hospital.  - We will start a Zio patch to monitor your heart rhythm.

## 2024-05-20 NOTE — PROGRESS NOTES
Sampson Fonseca arrived here on 5/20/2024 4:21 PM for 8-14 Days  Zio monitor placement per ordering provider Dr. Pace for the diagnosis R00.2.  Patient s skin was prepped per protocol.  Zio monitor was placed.  Instructions were reviewed with and given to the patient.  Patient verbalized understanding of wear, troubleshooting and monitor return instructions.

## 2024-05-20 NOTE — PROGRESS NOTES
Assessment & Plan     Postpartum hypertension  Based on clinic readings, blood pressure appears to be at goal with both our clinic BP cuff and her home BP cuff, which patient brought in today.  Continue nifedipine 60 mg daily until at least when she sees me for her 6-week postpartum follow-up visit on 6/17.  Also will have her record her home blood pressures until she sees me for her Nexplanon insertion appointment on 6/3.  Pre-E symptoms reviewed with patient and she is aware to go to ED if she develops these symptoms.    Symptomatic tachycardia  Per chart review, patient has had tachycardia since during her pregnancy, with a max of  back in March and April.  However, she only developed symptomatic tachycardia after delivery with palpitations, but no dizziness or presyncope.  Has never been worked up before; as she is currently asymptomatic we will start with Zio patch for 14 days and this was applied in clinic today.  Follow-up with me for this in 1 month.  - ZIO PATCH 8-14 DAYS (additional cost to patient)  - ZIO PATCH 8-14 DAYS APPLICATION      Return in about 2 weeks (around 6/3/2024) for Follow up, with me.    Patient precepted with Dr. Amaro.    Ana M Pace MD  [facility]    Nicole Fonseca is a 38 year old female presenting for the following health issues:    HPI  Patient was last seen by me 4 days ago.  At that time, blood pressure was normal in clinic but home readings were elevated with SBP in 150s.  Was taking nifedipine 30 mg daily, so doubled this to 60 mg daily.  Pre-E labs were normal.    Today, patient states that she has been taking nifedipine 60 mg daily and has been tolerating it well. No change in chronic headache, no vision changes, chest pain, dyspnea, RUQ pain, or new/worsening leg swelling. BP in clinic today is 124/88 on our cuff; patient also brings in her home BP machine and reading is 125/90 in clinic.    Patient takes her medication at 8-9pm each  "day.    5/16 9:07 PM: 144/108,   5/17 7:50 PM: 134/108,   5/18 11:54 PM: 137/107,   5/19 9:26 PM: 132/102,     Reports she has felt her heart racing at times in the past; episodes will spontaneously resolve. Most recent episode was 1 week ago; episode lasted 3 minutes. This started happening after her most recent delivery. Has never noticed it before. Denies dizziness or presyncope with episodes. Is unsure how often the episodes come.    Review of Systems   Pertinent positives and negatives as noted in HPI.        Objective    /84 (BP Location: Left arm, Patient Position: Sitting, Cuff Size: Adult Regular)   Pulse 103   Temp 97.8  F (36.6  C) (Oral)   Resp 16   Ht 1.492 m (4' 10.75\")   Wt 64.4 kg (142 lb)   LMP 06/13/2023   SpO2 100%   Breastfeeding Yes   BMI 28.93 kg/m    Body mass index is 28.93 kg/m .  Physical Exam   GENERAL: alert and no distress  RESP: lungs clear to auscultation  CV: regular rate and rhythm, normal S1 S2, no peripheral edema  ABDOMEN: soft, nontender, bowel sounds normal  MS: no gross musculoskeletal defects noted  SKIN: no suspicious lesions or rashes  NEURO: Normal strength and tone, mentation intact and speech normal  PSYCH: mentation appears normal, affect normal      ----- Service Performed and Documented by Resident or Fellow ------  "

## 2024-05-29 ENCOUNTER — TELEPHONE (OUTPATIENT)
Dept: CARE COORDINATION | Facility: CLINIC | Age: 39
End: 2024-05-29
Payer: COMMERCIAL

## 2024-05-29 NOTE — TELEPHONE ENCOUNTER
Care Coordination Transportation    Appointment Date: 6/3/2024  Appointment Time: 2:10 pm    address: [address], [place], [address]  Patient Phone: [phone]  Destination: [address] [place] [address]  Transportation provider: [employer]: [phone]  Pick time: 1:10 pm -1:40 pm  Return Trip: Will call  The Patient has been given a reminder call & trip detail's: yes      Appointment Date: 6/17/2024  Appointment Time: 2:10 pm   address: [address], [place], [address]  Patient Phone: [phone]  Destination: [address] [place] [address]  Transportation provider:  [employer]: [phone]  Pick time: 1:10 pm -1:40 pm  Return Trip: Will call  The Patient has been given a reminder call & trip detail's: yes        Appointment Date: 6/28/2024  Appointment Time: 4:10 pm   address: [address], [place], [address]  Patient Phone: [phone]  Destination: [address] [place] [address]  Transportation provider:  [employer]: [phone]  Pick time: 3:10 pm - 3:40 pm  Return Trip: Will call  The Patient has been given a reminder call & trip detail's: yes          John Sullivan Sr.   Care Coordination  [address] [address]  [email or web address]   Office: [phone] Direct: [phone]  [facility]

## 2024-05-30 ENCOUNTER — PATIENT OUTREACH (OUTPATIENT)
Dept: CARE COORDINATION | Facility: CLINIC | Age: 39
End: 2024-05-30
Payer: COMMERCIAL

## 2024-05-30 NOTE — Clinical Note
Ananth Rodríguez and Dr Pace,  I am following up on Pt's understanding of how to request for medical rides by herself.

## 2024-05-30 NOTE — TELEPHONE ENCOUNTER
Writer reviewed Pt Chart notes. On 5/16/2024 Pt had appt with writer to learn how to request for medical rides with her health plan. Chart shows Pt has up coming appt on 6/03 at 2:10pm. Writer made Follow-up phone call to check Pt understanding on how to request for rides. PC to Pt at [phone] two times but the line is no longer active. Writer and [employer]  called Pt's , Misty Bentley at [phone].  says for writer to call wife at this number # [phone]. Writer explained the reason we are calling him is to reach his wife, the PT.  Misty says that he is at work. He will have wife call julita tomorrow morning around 10 am.

## 2024-06-03 ENCOUNTER — OFFICE VISIT (OUTPATIENT)
Dept: FAMILY MEDICINE | Facility: CLINIC | Age: 39
End: 2024-06-03
Payer: COMMERCIAL

## 2024-06-03 VITALS
OXYGEN SATURATION: 98 % | HEIGHT: 61 IN | SYSTOLIC BLOOD PRESSURE: 111 MMHG | HEART RATE: 90 BPM | WEIGHT: 141.6 LBS | RESPIRATION RATE: 20 BRPM | TEMPERATURE: 97.6 F | DIASTOLIC BLOOD PRESSURE: 83 MMHG | BODY MASS INDEX: 26.73 KG/M2

## 2024-06-03 DIAGNOSIS — Z30.017 INSERTION OF IMPLANTABLE SUBDERMAL CONTRACEPTIVE: Primary | ICD-10-CM

## 2024-06-03 LAB — HCG UR QL: NEGATIVE

## 2024-06-03 PROCEDURE — 11981 INSERTION DRUG DLVR IMPLANT: CPT | Mod: GC

## 2024-06-03 PROCEDURE — 81025 URINE PREGNANCY TEST: CPT

## 2024-06-03 NOTE — PATIENT INSTRUCTIONS
HOW TO USE: Use back up contraception, such as condoms, for 1 week after placement.  A Nexplanon Implant is effective at preventing pregnancy for up to 4 years.  The Nexplanon does not prevent HIV/AIDS or other sexually transmitted diseases.      SIDE EFFECTS:  Irregular vaginal bleeding (e.g., spotting) is common.  Call the advice nurse if you develop severe swelling, pain or numbness in your arm or if you cannot feel your implant device.    Report the Implant as a medication whenever you interact with your doctor.

## 2024-06-03 NOTE — PROGRESS NOTES
Preceptor Attestation:    I discussed the patient with the resident and evaluated the patient in person. I was present for and supervised the entire procedure. I have verified the content of the note, which accurately reflects my assessment of the patient and the plan of care.   Supervising Physician:  Lita Zhu MD

## 2024-06-03 NOTE — PROGRESS NOTES
Procedure Note-Nexplanon Insertion    Sampson Fonseca is a patient of Ana M Dempsey here for placement of etonogestrel implant (Nexplanon)    Indication:Contraception    Consent: Affirmation of informed consent was signed and scanned into the medical record. Risks, benefits and alternatives were discussed. Patient's questions were elicited and answered.  Time Out (Pause for the Cause) completed: Yes    Labs: UPT:  Negative  LMP:   Patient's last menstrual period was 06/13/2023.       Previous Contraception:  Nexplanon  Counseling:  Pt. counseled on potential side effects of  Nexplanon, including unpredictable spotting/bleeding and plan for removal/replacement of Nexplanon in 3 years or less.    Preoperative Diagnosis: Desires effective contraception  Postoperative Diagnosis: etonogestrel implant in place  Skin prep Betadine  Anesthesia 1% lidocaine, with epi    Technique:   Patient was placed supine with right arm exposed.  Zoey was made 8-10cm above medial epicondial and a guiding zoey 4 cm above the first.  Arm was prepped with betadine.Insertion point was anesthetized with 1% lidocaine 2mL. After stretching the skin with thumb and index finger around the insertion site.  Skin punctured with the tip of the needle inserted at 30 degrees and then lowered to horizontal position. While lifting the skin with the tip of the needle, slided the needle to it's full length. Applicator was then stabilized and the purple slider was unlocked by pushing it slightly down. Slider moved back completely until it stopped. Applicator was then removed.  Correct placement of the implant was confirmed by palpation in the patient's arm and visualizing the purple top of the obturator.  Insertion site was dressed with an adhesive covered by a pressure dressing.      User card and patient chart label filled out. User card  given to patient for record. Nexplanon added to medication list     Lot# [ [id number] ]    EBL: minimal  Complications:   No  Tolerance:  Pt tolerated procedure well and was in stable condition.         Follow up: Pt was instructed to call if bleeding, severe pain or foul smell.     Follow up in 2-4 weeks.  Resident: Ana M Pace MD  Faculty: Lita Zhu MD present for and supervised this entire procedure.

## 2024-06-07 PROCEDURE — 93248 EXT ECG>7D<15D REV&INTERPJ: CPT | Performed by: INTERNAL MEDICINE

## 2024-06-11 PROBLEM — K03.6 BETEL DEPOSIT ON TEETH: Status: RESOLVED | Noted: 2023-10-09 | Resolved: 2024-06-11

## 2024-06-11 PROBLEM — L72.3 SEBACEOUS CYST: Status: RESOLVED | Noted: 2018-06-07 | Resolved: 2024-06-11

## 2024-06-11 PROBLEM — G43.909 MIGRAINE: Status: RESOLVED | Noted: 2019-05-10 | Resolved: 2024-06-11

## 2024-06-11 PROBLEM — O35.BXX0 FETAL CARDIAC ANOMALY AFFECTING PREGNANCY, ANTEPARTUM: Status: RESOLVED | Noted: 2023-12-05 | Resolved: 2024-06-11

## 2024-06-11 PROBLEM — Z72.0 TOBACCO ABUSE: Status: RESOLVED | Noted: 2023-10-09 | Resolved: 2024-06-11

## 2024-06-11 PROBLEM — Z34.90 ENCOUNTER FOR INDUCTION OF LABOR: Status: RESOLVED | Noted: 2024-04-24 | Resolved: 2024-06-11

## 2024-06-11 PROBLEM — O09.529 SUPERVISION OF HIGH-RISK PREGNANCY OF ELDERLY MULTIGRAVIDA: Status: RESOLVED | Noted: 2023-10-09 | Resolved: 2024-06-11

## 2024-06-11 PROBLEM — O09.529 AMA (ADVANCED MATERNAL AGE) MULTIGRAVIDA 35+: Status: RESOLVED | Noted: 2023-10-09 | Resolved: 2024-06-11

## 2024-06-14 ENCOUNTER — PATIENT OUTREACH (OUTPATIENT)
Dept: CARE COORDINATION | Facility: CLINIC | Age: 39
End: 2024-06-14
Payer: COMMERCIAL

## 2024-06-14 NOTE — TELEPHONE ENCOUNTER
Writer called Pt with language Line [employer]  at  [phone]. A male voice  the phone and said that he is the son of PT. Writer asked to speak with Pt. Pt came to the phone. Writer reintroduced self and explained that this is a follow up on how her requests to transportation is going. Pt states that it is going well. She just put in a call the other day for an up coming appt.     No further action needed by this writer.  Case closed.

## 2024-06-17 ENCOUNTER — OFFICE VISIT (OUTPATIENT)
Dept: FAMILY MEDICINE | Facility: CLINIC | Age: 39
End: 2024-06-17
Payer: COMMERCIAL

## 2024-06-17 VITALS
HEIGHT: 61 IN | DIASTOLIC BLOOD PRESSURE: 90 MMHG | HEART RATE: 93 BPM | WEIGHT: 140.6 LBS | SYSTOLIC BLOOD PRESSURE: 127 MMHG | BODY MASS INDEX: 26.55 KG/M2 | TEMPERATURE: 97.8 F | RESPIRATION RATE: 20 BRPM | OXYGEN SATURATION: 100 %

## 2024-06-17 DIAGNOSIS — R00.0 SINUS TACHYCARDIA: ICD-10-CM

## 2024-06-17 DIAGNOSIS — Z12.4 CERVICAL CANCER SCREENING: ICD-10-CM

## 2024-06-17 PROCEDURE — 87624 HPV HI-RISK TYP POOLED RSLT: CPT

## 2024-06-17 PROCEDURE — G0145 SCR C/V CYTO,THINLAYER,RESCR: HCPCS

## 2024-06-17 PROCEDURE — 99214 OFFICE O/P EST MOD 30 MIN: CPT | Mod: GC

## 2024-06-17 RX ORDER — NIFEDIPINE 90 MG/1
90 TABLET, FILM COATED, EXTENDED RELEASE ORAL DAILY
Qty: 30 TABLET | Refills: 1 | Status: SHIPPED | OUTPATIENT
Start: 2024-06-17 | End: 2024-07-15

## 2024-06-17 NOTE — PROGRESS NOTES
"Preceptor attestation:  Vital signs reviewed: BP (!) 127/90 (BP Location: Right arm, Patient Position: Sitting, Cuff Size: Adult Regular)   Pulse 93   Temp 97.8  F (36.6  C) (Oral)   Resp 20   Ht 1.537 m (5' 0.5\")   Wt 63.8 kg (140 lb 9.6 oz)   SpO2 100%   Breastfeeding Yes   BMI 27.01 kg/m      Patient seen, evaluated, and discussed with the resident.  I verified the content of the note, which accurately reflects my assessment of the patient and the plan of care.    Supervising physician: Mabel James MD  [facility]  "

## 2024-06-17 NOTE — LETTER
June 21, 2024      Sampson Fonseca  [address] [place] [address]        Dear Ms.Mo,    We are writing to inform you of your test results.    Your pap smear and HPV results are negative, meaning you do not have cervical cancer. You will need another pap smear in 5 years. Please call our clinic if you have any questions.     Resulted Orders   Pap Screen with HPV - Recommended Age 30 - 65 Years   Result Value Ref Range    Human Papilloma Virus 16 DNA Negative Negative    Human Papilloma Virus 18 DNA Negative Negative    Human Papilloma Virus Other Negative Negative    FINAL DIAGNOSIS       This patient's sample is negative for high risk HPV DNA.          METHODOLOGY: The [employer] system uses automated extraction, simultaneous amplification of HPV (E6/E7 oncogenes) and beta-globin, followed by real time detection of fluorescent labeled HPV and beta globin using specific oligonucleotide probes. The test specifically identifies types HPV 16 DNA and HPV 18 DNA while concurrently detecting the rest of the high risk types (31, 33, 35, 39, 45, 51, 52, 56, 58, 59, 66 or 68).     COMMENTS: This test is not intended for use as a screening device for woman under age 30 with normal cervical cytology. Results should be correlated with cytologic and histologic findings. Close clinical follow up is recommended.       Gynecologic Cytology (PAP)   Result Value Ref Range    Interpretation        Negative for Intraepithelial Lesion or Malignancy (NILM)    Comment         Papanicolaou Test Limitations:  Cervical cytology is a screening test with limited sensitivity, and regular screening is critical for cancer prevention.  Pap tests are primarily effective for the diagnosis/prevention of squamous cell carcinoma, not adenocarcinoma or other cancers.        Specimen Adequacy       Satisfactory for evaluation, endocervical/transformation zone component present    Clinical Information       post-partum      Previous Abnormal?       No       Performing Labs       The technical component of this testing was completed at [facility].    Stain controls for all stains resulted within this report have been reviewed and show appropriate reactivity.         If you have any questions or concerns, please call the clinic at the number listed above.       Sincerely,      Ana M Pace MD

## 2024-06-17 NOTE — PATIENT INSTRUCTIONS
Nice to see you today!    Here's what we talked about:  - Please  your new blood pressure medication from the pharmacy - stop taking the old tablets and start taking the new tablets, one tablet per day.  - Come back to see me in 1 month.  - Keep writing down your blood pressures.  - Come back sooner or go to the ER if you have worsening headache, blurry vision, chest pain, trouble breathing.  - I will send you a letter with your results if they are normal, and will call you if they are not normal.

## 2024-06-17 NOTE — PROGRESS NOTES
DATE:  2024  CHIEF COMPLAINT:    Chief Complaint   Patient presents with    Post Partum Exam    Medication Reconciliation     Med reviewed            SUBJECTIVE       Sampson MAIN Fonseca is a  Female who is 38 year old with a PMHx significant for:     Patient Active Problem List   Diagnosis    Language barrier, cultural differences    Postpartum hypertension    Sinus tachycardia     confirmed and updated problem list    She is Here today for her 6 week post-partum visit. Delivery Date: 24    She had a induced vaginal delivery of viable Baby girl who weighed 7 lbs 6 oz, with Shoulder Dystocia complications. There was no laceration.      Current Concerns: no concerns today. No change in chronic headache, no change in vision, chest pain, dyspnea, RUQ pain, or new/worsening leg swelling.    6/10/24: 127/105, HR 93  24: 131/101, HR 93  24: 124/91, HR 92  24: 126/98,   24: 142/110, HR 94  6/15/24: 138/105, HR 95  24: 136/105,     Reviewed Zio patch results - they show sinus rhythm with some sinus tachycardia and sinus bradycardia. Denies dizziness or palpitations.    Post Partum ROS:  1) How are your feeding your baby and do you have any concerns about this? Baby is still in NICU (congenital heart defect) and patient is still pumping breast milk and bringing to hospital.    3) Post-partum Vaginal Bleeding: YES- continues to decrease since birth.    4) Vaginal Tissue: healing well, no complaints.    5)Hemorrhoids: No, resolved    6) Returning to [place]:   Patient has not had intercourse since delivery.    7)Abdominal pain: No    8) Contraception: Nexplanon placed on 6/3/24    9) Sleep/stress/support/caring for baby: mood is good.    10) Postpartum depression screening was negative. EPDS score 0.     A [employer]  was used for this visit.        OBJECTIVE     Physical Exam:  Vitals:    24 1402 24 1406   BP: (!) 130/91 (!) 127/90   BP Location: Right arm  "Right arm   Patient Position: Sitting Sitting   Cuff Size: Adult Regular Adult Regular   Pulse: 93    Resp: 20    Temp: 97.8  F (36.6  C)    TempSrc: Oral    SpO2: 100%    Weight: 63.8 kg (140 lb 9.6 oz)    Height: 1.537 m (5' 0.5\")      Body mass index is 27.01 kg/m .    Gen:  NAD  CV:  RRR  - no murmurs, rubs, or gallups  Pulm:  CTAB, no wheezes/rales/rhonchi  ABD: soft, nontender, no masses, no rebound, BS intact throughout  : normal female external genitalia, well healed perineum, some brown-red blood in vaginal canal, normal cervix. Pap smear obtained.  Psych: Mood and affect appropriate    LABS:  No results found for this or any previous visit (from the past 24 hour(s)).   Last PAP: No results found for: \"PAP\"    ASSESSMENT AND PLAN     Sampson Fonseca is a  Female who is 38 year old years old with a PMHx significant for:     Patient Active Problem List   Diagnosis    Language barrier, cultural differences    Postpartum hypertension    Sinus tachycardia       1. 6 week post-partum visit: Doing well.     2. Post- partum depression screen:negative.     3. Breastfeeding problems or questions: No    4. Pap smear was done at this time.     5. Immunizations given: COVID shot declined.    6. Contraception Plan:Nexplanon    7. History of GDM: no    8. History of Hypertensive of pregnancy disorders: no, but has had postpartum hypertension; see below.    9.  Caring for baby: Mom states she is waiting to get connected with WIC if needed after baby returns home.    10. Follow-up: RTC in 1 month for follow up of postpartum hypertension or sooner if develops new or worsening symptoms.    Sampson was seen today for post partum exam and medication reconciliation.    Diagnoses and all orders for this visit:    Routine postpartum follow-up  -     Pap Screen with HPV - Recommended Age 30 - 65 Years    Postpartum hypertension  Blood pressure increased and now borderline on recheck in clinic at 127/90; her home blood pressures are " also high but we have previously verified in clinic visit that her home blood pressure cuff readings are generally higher than clinic BP cuff readings. Given that BP has increased, will increase nifedipine dose to 90 mg daily from 60 mg daily. Patient continues to be asymptomatic; will not check labs today. Return precautions discussed. Would not call this chronic hypertension until 6 months postpartum if she continues to have elevated blood pressures. RTC in 1 month to recheck BP.  -     NIFEdipine ER (ADALAT CC) 90 MG 24 hr tablet; Take 1 tablet (90 mg) by mouth daily    Cervical cancer screening  -     Pap Screen with HPV - Recommended Age 30 - 65 Years    Sinus tachycardia  Zio patch results showing patient's chronic borderline tachycardic heart rates are sinus tachycardia. Patient is asymptomatic; no further intervention needed at this time.    The longitudinal plan of care for the diagnosis(es)/condition(s) as documented were addressed during this visit. Due to the added complexity in care, I will continue to support Sampson in the subsequent management and with ongoing continuity of care.       Ana M Pace MD    Patient was precepted with and seen by Dr. James.    [address] [address] [phone]

## 2024-06-19 LAB
HPV HR 12 DNA CVX QL NAA+PROBE: NEGATIVE
HPV16 DNA CVX QL NAA+PROBE: NEGATIVE
HPV18 DNA CVX QL NAA+PROBE: NEGATIVE
HUMAN PAPILLOMA VIRUS FINAL DIAGNOSIS: NORMAL

## 2024-06-20 LAB
BKR LAB AP GYN ADEQUACY: NORMAL
BKR LAB AP GYN INTERPRETATION: NORMAL
BKR LAB AP PREVIOUS ABNORMAL: NORMAL
PATH REPORT.COMMENTS IMP SPEC: NORMAL
PATH REPORT.COMMENTS IMP SPEC: NORMAL
PATH REPORT.RELEVANT HX SPEC: NORMAL

## 2024-06-26 ENCOUNTER — LACTATION ENCOUNTER (OUTPATIENT)
Dept: PEDIATRICS | Facility: CLINIC | Age: 39
End: 2024-06-26

## 2024-06-26 NOTE — LACTATION NOTE
This note was copied from a baby's chart.  Lactation Follow Up Note    Reason for visit/ call/ message:  Check in / discharge teaching via phone .    Supply:  Pumping every 3 hours, getting about 3-4 bottles per day.    Significant changes (medications, equipment, comfort, etc):  N/A    Skin to skin/ nuzzling/ latching:  Skin to skin encouraged.  Sampson asks if she is able to latch Sha Mari. Confirmed with SLP that it is OK for Oneal Guerra to latch.    Education given:  Discussed benefits of breastmilk, even if not covering all of baby's needs.  Encouraged to continue to pump, as long as desired.    Plan:  Reviewed outpatient lactation resources through [facility], encouraged to call for appointment for support.  Inpatient lactation team available for latch support prior to discharge.        Desiree Bonilla RN, IBCLC   Lactation Consultant  [facility]: [facility] [phone]  Office: [phone]

## 2024-07-15 ENCOUNTER — OFFICE VISIT (OUTPATIENT)
Dept: FAMILY MEDICINE | Facility: CLINIC | Age: 39
End: 2024-07-15
Payer: COMMERCIAL

## 2024-07-15 VITALS
RESPIRATION RATE: 20 BRPM | DIASTOLIC BLOOD PRESSURE: 73 MMHG | HEIGHT: 59 IN | TEMPERATURE: 98.3 F | BODY MASS INDEX: 28.1 KG/M2 | HEART RATE: 104 BPM | WEIGHT: 139.4 LBS | SYSTOLIC BLOOD PRESSURE: 106 MMHG | OXYGEN SATURATION: 100 %

## 2024-07-15 PROCEDURE — 99214 OFFICE O/P EST MOD 30 MIN: CPT | Mod: 24

## 2024-07-15 RX ORDER — NIFEDIPINE 90 MG/1
90 TABLET, FILM COATED, EXTENDED RELEASE ORAL DAILY
Qty: 60 TABLET | Refills: 1 | Status: SHIPPED | OUTPATIENT
Start: 2024-07-15

## 2024-07-15 NOTE — PATIENT INSTRUCTIONS
Nice to see you today!    Here's what we talked about:  - We will keep you on the same dose of blood pressure medication until September.  - In September, we will try to slowly decrease your medication and see how your blood pressure does off the medication.  - You can  medication refills at the pharmacy.  - If you start having any symptoms - change in your normal headache, change in vision, chest pain, trouble breathing, belly pain, and leg swelling, please come in sooner to be seen.  - You don't need to write down your numbers anymore!

## 2024-07-15 NOTE — PROGRESS NOTES
Preceptor Attestation:    I discussed the patient with the resident and evaluated the patient in person. I have verified the content of the note, which accurately reflects my assessment of the patient and the plan of care.   Supervising Physician:  Richard Alberto MD.

## 2024-07-15 NOTE — PROGRESS NOTES
Assessment & Plan     Postpartum hypertension  Blood pressure at goal in clinic today.  Home readings are more elevated, but this has been typical when home blood pressure cuff was compared to clinic blood pressure cuff readings at previous visit.  Patient is asymptomatic.  Will continue on nifedipine 90 mg daily and have patient return in 2 months to start weaning off medication to see if she continues to be hypertensive off medication (would be considered chronic hypertension if continues to have elevated BP 6 months postpartum).  - NIFEdipine ER (ADALAT CC) 90 MG 24 hr tablet  Dispense: 60 tablet; Refill: 1  - Can stop home BP readings; monitor for pre-E symptoms at home and these were discussed with patient      Return in about 2 months (around 9/15/2024) for Follow up.    Patient precepted with Richard Alberto MD.    The longitudinal plan of care for the diagnosis(es)/condition(s) as documented were addressed during this visit. Due to the added complexity in care, I will continue to support Sampson in the subsequent management and with ongoing continuity of care.    Subjective   Sampson is a 38 year old, presenting for the following health issues:  Hypertension (Follow-up high blood pressure)        7/15/2024     3:04 PM   Additional Questions   Roomed by bola   Accompanied by self         7/15/2024    Information    services provided? Yes    Yes    Yes   Language Jose Manuel Richard   Type of interpretation provided Face-to-face    Telephone    name Sanjiv Reddyathan    ID [id number]    Agency [facility]  Services       Multiple values from one day are sorted in reverse-chronological order     HPI   Last seen by me 1 month ago for 6-week postpartum visit.  Has Nexplanon placed.  Pap smear with HPV cotesting completed and was negative.  Blood pressures in clinic increased, so nifedipine dose increased to 90 mg daily.  Patient  "continued to be asymptomatic.  Zio patch showed sinus tachycardia, no further follow-up needed for chronic tachycardia as patient was asymptomatic.    Patient today reports that she was able to  the medication from the pharmacy and has been taking the new dose at home without side effects.    Postpartum vaginal bleeding has resolved. No change in chronic headache, no vision changes, chest pain, dyspnea, abdominal pain, or new/worsening leg edema.    Baby has returned home!    Home readings from this past week:  7/5/24: 132/104,   7/6/24: 140/109, HR 99  7/8/24: 138/109, HR 93  7/9/24: 137/108, HR 99  7/10/24: 136/103,   7/11/24: 125/96,   7/13/24: 116/92,   7/14/24: 135/108,     Pertinent positives and negatives as noted in HPI.      Objective    /73 (BP Location: Right arm, Patient Position: Sitting, Cuff Size: Adult Regular)   Pulse 104   Temp 98.3  F (36.8  C) (Oral)   Resp 20   Ht 1.486 m (4' 10.5\")   Wt 63.2 kg (139 lb 6.4 oz)   SpO2 100%   BMI 28.64 kg/m    Body mass index is 28.64 kg/m .  Physical Exam   GENERAL: alert and no distress  MS: no gross musculoskeletal defects noted. No bilateral LE edema.  SKIN: no suspicious lesions or rashes  NEURO: Normal strength and tone, mentation intact and speech normal  PSYCH: mentation appears normal, affect normal        Signed Electronically by: Ana M Pace MD    "

## 2024-07-19 ENCOUNTER — MEDICAL CORRESPONDENCE (OUTPATIENT)
Dept: HEALTH INFORMATION MANAGEMENT | Facility: CLINIC | Age: 39
End: 2024-07-19
Payer: COMMERCIAL

## 2024-08-05 ENCOUNTER — DOCUMENTATION ONLY (OUTPATIENT)
Dept: FAMILY MEDICINE | Facility: CLINIC | Age: 39
End: 2024-08-05
Payer: COMMERCIAL

## 2024-08-05 NOTE — PROGRESS NOTES
To be completed in Nursing note:    Please reference list for forms that require a visit for completion.  Please remind patients that providers are given 3-5 business days to complete and return forms.      Form type: [facility]    Date form received: 24    Date form completed by Physician:24    How was form returned to patient (mailed, faxed, or at  for patient to ): faxed to     Date form mailed/faxed/left at  for patient and sent to HIM for scannin24, sent to HIM for scanning      Once form is left for patient, faxed, or mailed PCS will then close the documentation only encounter.     Jordan Goff MA      
[[phone]]

## 2024-09-16 ENCOUNTER — OFFICE VISIT (OUTPATIENT)
Dept: FAMILY MEDICINE | Facility: CLINIC | Age: 39
End: 2024-09-16
Payer: COMMERCIAL

## 2024-09-16 VITALS
RESPIRATION RATE: 18 BRPM | SYSTOLIC BLOOD PRESSURE: 122 MMHG | DIASTOLIC BLOOD PRESSURE: 84 MMHG | OXYGEN SATURATION: 99 % | TEMPERATURE: 99.1 F | WEIGHT: 143.8 LBS | HEIGHT: 59 IN | HEART RATE: 90 BPM | BODY MASS INDEX: 28.99 KG/M2

## 2024-09-16 DIAGNOSIS — M79.644 THUMB PAIN, RIGHT: ICD-10-CM

## 2024-09-16 PROCEDURE — 99214 OFFICE O/P EST MOD 30 MIN: CPT | Mod: GC

## 2024-09-16 RX ORDER — NIFEDIPINE 30 MG
TABLET, EXTENDED RELEASE ORAL
Qty: 21 TABLET | Refills: 0 | Status: SHIPPED | OUTPATIENT
Start: 2024-09-16

## 2024-09-16 ASSESSMENT — ANXIETY QUESTIONNAIRES
GAD7 TOTAL SCORE: 0
8. IF YOU CHECKED OFF ANY PROBLEMS, HOW DIFFICULT HAVE THESE MADE IT FOR YOU TO DO YOUR WORK, TAKE CARE OF THINGS AT HOME, OR GET ALONG WITH OTHER PEOPLE?: NOT DIFFICULT AT ALL
7. FEELING AFRAID AS IF SOMETHING AWFUL MIGHT HAPPEN: NOT AT ALL

## 2024-09-16 ASSESSMENT — PATIENT HEALTH QUESTIONNAIRE - PHQ9
SUM OF ALL RESPONSES TO PHQ QUESTIONS 1-9: 0
SUM OF ALL RESPONSES TO PHQ QUESTIONS 1-9: 0
10. IF YOU CHECKED OFF ANY PROBLEMS, HOW DIFFICULT HAVE THESE PROBLEMS MADE IT FOR YOU TO DO YOUR WORK, TAKE CARE OF THINGS AT HOME, OR GET ALONG WITH OTHER PEOPLE: NOT DIFFICULT AT ALL

## 2024-09-16 NOTE — PROGRESS NOTES
Assessment & Plan     Postpartum hypertension  Blood pressure at goal today and asymptomatic.  Has been adherent to nifedipine 90 mg daily.  Will start weaning down nifedipine to 60 mg daily for 1 week, then 30 mg daily for 1 week, then stopping completely.  Will have patient return in 1 month for blood pressure recheck.  Return precautions of hypertensive emergency symptoms reviewed.  - NIFEdipine ER (ADALAT CC) 30 MG 24 hr tablet  Dispense: 21 tablet; Refill: 0    Thumb pain, right  Acute new right base of thumb pain x 1 week.  Negative Finkelstein test; lower suspicion for de Quervain's tenosynovitis.  Strength and range of motion of MP and IP joints also normal.  No joint swelling, erythema, or warmth.  May be tendinitis; will try conservative management with Voltaren gel and Tiger balm and if that does not improve symptoms, can try OTC thumb spica splint.  Follow-up in 1 month.  - diclofenac (VOLTAREN) 1 % topical gel  Dispense: 350 g; Refill: 1      Return in about 4 weeks (around 10/14/2024) for Follow up.    Patient precepted with Dr. Soliman.    The longitudinal plan of care for the diagnosis(es)/condition(s) as documented were addressed during this visit. Due to the added complexity in care, I will continue to support Sampson in the subsequent management and with ongoing continuity of care.    Ana M Pace MD  [facility]    Nicole Fonseca is a 39 year old female presenting for the following health issues:    HPI  Patient last seen by me 2 months ago for postpartum hypertension. Blood pressure at goal in clinic with home readings were elevated, which has been typical. Patient is asymptomatic. Continued on nifedipine 90 mg daily and returns today to check BP and start weaning off medication.     Today, patient reports R thumb pain for this past week. Has been waking up with pain in the morning. No history of injury. Using Tiger Balm is helpful. Using it causes pain. Does not use hands  "a lot for work.    Denies change in chronic headache, vision changes, chest pain, dyspnea, or new or worse leg swelling.    Review of Systems   Pertinent positives and negatives as noted in HPI.        Objective    /84 (BP Location: Left arm, Patient Position: Sitting, Cuff Size: Adult Regular)   Pulse 90   Temp 99.1  F (37.3  C) (Oral)   Resp 18   Ht 1.488 m (4' 10.6\")   Wt 65.2 kg (143 lb 12.8 oz)   LMP 09/12/2024   SpO2 99%   BMI 29.44 kg/m    Body mass index is 29.44 kg/m .  Physical Exam   GENERAL: alert and no distress  MS: no gross musculoskeletal defects noted. R thumb with normal ROM at MP and IP joints; good flexion/extension strength at both joints. Pain with palpation at base of thumb. No erythema, warmth, or swelling. Some pain with palpation of tendons along back of thumb. Negative Finkelstein test.  SKIN: no suspicious lesions or rashes  NEURO: Normal strength and tone, mentation intact and speech normal  PSYCH: mentation appears normal, affect normal    Answers submitted by the patient for this visit:  Patient Health Questionnaire (Submitted on 9/16/2024)  If you checked off any problems, how difficult have these problems made it for you to do your work, take care of things at home, or get along with other people?: Not difficult at all  PHQ9 TOTAL SCORE: 0  Patient Health Questionnaire (G7) (Submitted on 9/16/2024)  LUCHO 7 TOTAL SCORE: 0    ----- Service Performed and Documented by Resident or Fellow ------  "

## 2024-09-16 NOTE — PROGRESS NOTES
"Assessment & Plan     There are no diagnoses linked to this encounter.    No follow-ups on file.    Patient precepted with Dr. Soliman.    Ana M Pace MD  [facility]    Nicole   Sampson D Mo is a 39 year old female presenting for the following health issues:    HPI  Patient last seen by me 2 months ago for postpartum hypertension.  Blood pressure at goal in clinic with home readings were elevated, which has been typical.  Patient is asymptomatic.  Continued on nifedipine 90 mg daily and returns today to check BP and start weaning off medication.    ***    Review of Systems   Pertinent positives and negatives as noted in HPI.        Objective    /84 (BP Location: Left arm, Patient Position: Sitting, Cuff Size: Adult Regular)   Pulse 90   Temp 99.1  F (37.3  C) (Oral)   Resp 18   Ht 1.488 m (4' 10.6\")   Wt 65.2 kg (143 lb 12.8 oz)   LMP 09/12/2024   SpO2 99%   BMI 29.44 kg/m    Body mass index is 29.44 kg/m .  Physical Exam   GENERAL: alert and no distress  RESP: lungs clear to auscultation  CV: regular rate and rhythm, normal S1 S2, no peripheral edema  ABDOMEN: soft, nontender, bowel sounds normal  MS: no gross musculoskeletal defects noted  SKIN: no suspicious lesions or rashes  NEURO: Normal strength and tone, mentation intact and speech normal  PSYCH: mentation appears normal, affect normal    {Diagnostic Test Results (Optional):295997}    ----- Service Performed and Documented by Resident or Fellow ------  "

## 2024-09-16 NOTE — PATIENT INSTRUCTIONS
Nice to see you today!    Here's what we talked about:  - Stop taking your current blood pressure medication. Start taking the new blood pressure medication (smaller dose pills) - 2 pills once a day for 7 days, then 1 pill once a day for 7 days, then stop completely. Come back in 1 month to recheck your blood pressure. Please come back sooner if you have a worsening headache than usual, change in vision, chest pain, trouble breathing, or new or worsening leg swelling.

## 2024-09-17 NOTE — PROGRESS NOTES
Preceptor Attestation:    I discussed the patient with the resident and evaluated the patient in person. I have verified the content of the note, which accurately reflects my assessment of the patient and the plan of care.   Supervising Physician:  Gregg Soliman MD.  Answers submitted by the patient for this visit:  Patient Health Questionnaire (Submitted on 9/16/2024)  If you checked off any problems, how difficult have these problems made it for you to do your work, take care of things at home, or get along with other people?: Not difficult at all  PHQ9 TOTAL SCORE: 0  Patient Health Questionnaire (G7) (Submitted on 9/16/2024)  LUCHO 7 TOTAL SCORE: 0

## 2024-09-21 ENCOUNTER — HEALTH MAINTENANCE LETTER (OUTPATIENT)
Age: 39
End: 2024-09-21

## 2024-10-17 ENCOUNTER — OFFICE VISIT (OUTPATIENT)
Dept: FAMILY MEDICINE | Facility: CLINIC | Age: 39
End: 2024-10-17
Payer: COMMERCIAL

## 2024-10-17 VITALS
TEMPERATURE: 97.9 F | HEART RATE: 83 BPM | SYSTOLIC BLOOD PRESSURE: 112 MMHG | BODY MASS INDEX: 28.51 KG/M2 | WEIGHT: 141.4 LBS | RESPIRATION RATE: 20 BRPM | DIASTOLIC BLOOD PRESSURE: 80 MMHG | HEIGHT: 59 IN | OXYGEN SATURATION: 100 %

## 2024-10-17 DIAGNOSIS — M79.644 THUMB PAIN, RIGHT: ICD-10-CM

## 2024-10-17 PROCEDURE — 90471 IMMUNIZATION ADMIN: CPT

## 2024-10-17 PROCEDURE — 90656 IIV3 VACC NO PRSV 0.5 ML IM: CPT

## 2024-10-17 PROCEDURE — 99213 OFFICE O/P EST LOW 20 MIN: CPT | Mod: 25

## 2024-10-17 NOTE — PATIENT INSTRUCTIONS
Nice to see you today!    Here's what we talked about:  - You can stop the blood pressure medication.  - You can use the wrist and thumb support during the day, but can take it off when you're not using your hands. Keep using the gels on your hand. Also do some thumb exercises to keep the thumb moving. If you are not better or worse after another 1 month, please try to go to hand therapy.

## 2024-10-17 NOTE — PROGRESS NOTES
Assessment & Plan     Postpartum hypertension  Blood pressure normal and asymptomatic after stopping nifedipine.  Will discontinue nifedipine and postpartum hypertension has resolved.    Thumb pain, right  Continuing to have pain at right base of thumb; negative Finklestein test so more likely tendinitis as patient does use hands a lot.  Wrist/thumb spica splint and home thumb exercises given today in clinic and continue Voltaren gel and Tiger balm.  Hand therapy referral pre-emptively placed and if patient continues to have pain after 1 month, she can call to schedule a hand therapy appointment.  - Wrist/Arm/Hand Bracing Supplies Order Wrist Brace; Right; with thumb spica  - Occupational Therapy  Referral    Flu shot given today.    Return in about 3 months (around 1/17/2025) for Routine preventive.    Patient precepted with Alvaro Jama MD.    The longitudinal plan of care for the diagnosis(es)/condition(s) as documented were addressed during this visit. Due to the added complexity in care, I will continue to support Sampson in the subsequent management and with ongoing continuity of care.    Subjective   Sampson is a 39 year old, presenting for the following health issues:  Hypertension (Bp ck )        10/17/2024     8:14 AM   Additional Questions   Roomed by SEBASTIAN.  MA   Accompanied by Self         10/17/2024    Information    services provided? Yes   Language Jose Manuel   Type of interpretation provided Face-to-face    name Sanjiv Bravo    [facility]   Patient last seen by me 1 month ago.  At that time, blood pressure at goal on nifedipine 90 mg daily asymptomatic.  Began plan to wean down and stop nifedipine completely and have patient return today for blood pressure recheck.  Also had acute new right base of thumb pain that may be tendinitis; recommended conservative management with Voltaren gel and Tiger balm.    Today, patient states that her last dose  "of nifedipine was 2-3 days ago. No change in usual headache, no blurry vision, chest pain, dyspnea, RUQ pain, or new or worsening leg swelling.    Patient is still having thumb pain, has been applying Voltaren 3 times/day. Pain is about the same; sometimes there's no pain and sometimes it's really bad. Uses hands a lot to do work around the house. Pain described as close to bones. Sometimes has R wrist pain more proximal to thumb; does not have pain today.    Pertinent positives and negatives as noted in HPI.      Objective    /80   Pulse 83   Temp 97.9  F (36.6  C) (Tympanic)   Resp 20   Ht 1.499 m (4' 11\")   Wt 64.1 kg (141 lb 6.4 oz)   LMP 10/15/2024 (Approximate)   SpO2 100%   Breastfeeding Yes   BMI 28.56 kg/m    Body mass index is 28.56 kg/m .  Physical Exam   GENERAL: alert and no distress  MS: no gross musculoskeletal defects noted. Full passive ROM of both thumbs. Pain with palpation of R base of thumb. Negative Finkelstein test.  SKIN: no suspicious lesions or rashes  NEURO: Normal strength and tone, mentation intact and speech normal  PSYCH: mentation appears normal, affect normal        Signed Electronically by: Ana M Pace MD    "

## 2024-10-21 ENCOUNTER — THERAPY VISIT (OUTPATIENT)
Dept: OCCUPATIONAL THERAPY | Facility: REHABILITATION | Age: 39
End: 2024-10-21
Attending: FAMILY MEDICINE
Payer: COMMERCIAL

## 2024-10-21 DIAGNOSIS — M79.644 THUMB PAIN, RIGHT: ICD-10-CM

## 2024-10-21 PROCEDURE — 97530 THERAPEUTIC ACTIVITIES: CPT | Mod: GO | Performed by: OCCUPATIONAL THERAPIST

## 2024-10-21 PROCEDURE — 97110 THERAPEUTIC EXERCISES: CPT | Mod: GO | Performed by: OCCUPATIONAL THERAPIST

## 2024-10-21 PROCEDURE — 97165 OT EVAL LOW COMPLEX 30 MIN: CPT | Mod: GO | Performed by: OCCUPATIONAL THERAPIST

## 2024-10-21 NOTE — PROGRESS NOTES
"OCCUPATIONAL THERAPY EVALUATION  Type of Visit: Evaluation        Fall Risk Screen:  Fall screen completed by: OT  Have you fallen 2 or more times in the past year?: No  Have you fallen and had an injury in the past year?: No  Is patient a fall risk?: No    Subjective      Presenting condition or subjective complaint: no  Date of onset: 10/17/24 (Referral)    Relevant medical history:     Dates & types of surgery: head surgery    Thumb spica helps sometimes, not all the time. Has been wearing  during the day, not at night. Night is subsequently a little painful.     Patient comes to therapy today for evalaution and treatment of bilateral wrist pain.  She presents with both an in person  as well as her daughter-in-law.  She fairly recently gave birth to her daughter (fifth child) in April of this year, continues to have pain at right base of thumb.  Symptoms began shortly after giving birth.  Symptoms are most severe with repetitive and/or heavy activity including meal prep, cleaning/wiping and cooking.  She is unable to articulate what particular movements or positions are especially provocative, denies snapping or clicking to the thumb.  She has been utilizing a short thumb spica during provocative daily activity, this is helpful.  She also has a more substantial forearm-based thumb spica provided by her doctor.  She has been using topical diclofenac with good relief, as well.  Per Dr. Pace 10/17/2024, \"negative Finklestein test so more likely tendinitis as patient does use hands a lot.  Wrist/thumb spica splint and home thumb exercises given today in clinic and continue Voltaren gel and Tiger balm.  Hand therapy referral pre-emptively placed and if patient continues to have pain after 1 month, she can call to schedule a hand therapy appointment.\"    Prior diagnostic imaging/testing results: X-ray     Prior therapy history for the same diagnosis, illness or injury: No      Prior Level of " Function  Transfers: Independent  Ambulation: Independent  ADL: Independent  IADL: Childcare, Driving, Finances, Housekeeping, Laundry, Meal preparation, Medication management, Yard work    Living Environment  Social support: With family members, house of 18, has good family assistance  Type of home: House   Stairs to enter the home: Yes       Ramp: No   Stairs inside the home: Yes 16 Is there a railing: No     Help at home: None  Equipment owned:       Employment: No    Hobbies/Interests:      Patient goals for therapy: b/a       Objective   ADDITIONAL HISTORY:  Right hand dominant  Patient reports symptoms of pain, stiffness/loss of motion, weakness/loss of strength, and edema  Transportation: Receives transportation via family.  Currently not working.    Functional Outcome Measure:   Upper Extremity Functional Index Score:  SCORE:   Column Totals: /80: 64   (A lower score indicates greater disability.)    PAIN:  Pain Level at Rest: 2/10  Pain Level with Use: 8/10  Pain Location: Dorsum of the right thumb and wrist, does not extend proximally much along the course of the first DWC beyond the CMC joint.  Pain Quality: Aching and Sharp  Pain Frequency: intermittent  Pain is Worst: daytime  Pain is Exacerbated By: Lifting, cleaning, cooking.  Pain is Relieved By: NSAIDs, rest, and adequate.  Pain Progression: Improved    POSTURE: Normal     EDEMA: Trace to palpation over the right first CMC and first DWC, absent on the left.    SENSATION: WNL throughout all nerve distributions; per patient report     ROM:  AROM of the bilateral elbow, forearm, wrist, thumb and digits are within normal limits bilaterally.  Uncomfortable at endrange thumb composite flexion, wrist flexion and ulnar deviation on the left.    SPECIAL TESTS:   De Quervain's Special Tests  Pain Report Left Right   Finkelstein's Test + +   Crepitus Present Negative Negative to the first DWC, also not appreciable over the A1 pulley.   WHAT Test + +      STRENGTH:     Measured in pounds 10/21/2024 10/21/2024    Left Right   Trial 1 NT NT   Trial 2     Trial 3     Average       Lateral Pinch  Measured in pounds 10/21/2024 10/21/2024    Left Right   Trial 1 15# 12#, + with reference to 1st CMC, 1st DWC   Trial 2     Trial 3     Average       PALPATION:  Trace to the right first CMC and first DWC, negative on the left.    Assessment & Plan   CLINICAL IMPRESSIONS  Medical Diagnosis: Pain of right thumb    Treatment Diagnosis: Pain of right thumb    Impression/Assessment: Pt is a 39 year old female presenting to Occupational Therapy due to pain of right thumb.  The following significant findings have been identified: Impaired activity tolerance, Impaired coordination, Impaired ROM, Impaired strength, and Pain.  These identified deficits interfere with their ability to perform self care tasks, recreational activities, household chores, medication management, financial management,  yard work, care of others, and meal planning and preparation as compared to previous level of function.   Patient's limitations or Problem List includes: Pain, Decreased ROM/motion, Increased edema, Weakness, Hypermobility, Hypomobility, Decreased , Decreased pinch, Decreased coordination, Decreased dexterity, and Tightness in musculature of the right wrist, hand, and thumb which interferes with the patient's ability to perform Self Care Tasks (dressing, eating, bathing, hygiene/toileting), Sleep Patterns, Recreational Activities, Household Chores, and Driving  as compared to previous level of function.    Clinical Decision Making (Complexity):  Assessment of Occupational Performance: 3-5 Performance Deficits  Occupational Performance Limitations: bathing/showering, toileting, dressing, feeding, hygiene and grooming, care of others, child rearing, driving and community mobility, health management and maintenance, home establishment and management, meal preparation and cleanup,  shopping, sleep, leisure activities, and social participation  Clinical Decision Making (Complexity): Low complexity    PLAN OF CARE  Treatment Interventions:  Modalities:  US  Therapeutic Exercise:  AROM, AAROM, PROM, Tendon Gliding, Blocking, Reverse Blocking, Place and Hold, Contract Relax, Extensor Tracking, Isotonics, Isometrics, and Stabilization  Neuromuscular re-education:  Nerve Gliding, Coordination/Dexterity, Kinesthetic Training, Proprioceptive Training, Posture, Kinesiotaping, Strain Counter Strain, Isometrics, and Stabilization  Manual Techniques:  Coordination/Dexterity, Joint mobilization, Friction massage, Myofascial release, and Manual edema mobilization  Orthotic Fabrication:  Static, Finger based, Hand based, and Forearm based  Self Care:  Self Care Tasks and Ergonomic Considerations    Long Term Goals   OT Goal 1  Goal Identifier: Goal I  Goal Description: Patient will complete required ADL, IADL, leisure and homemaking tasks with mild or less pain in her difficulty utilizing orthotics and soft tissue protection principles as needed.  (0-3/10)  Rationale: In order to maximize safety and independence with performance of self-care activities;In order to maximize safety and independence with ADL/IADLs  Goal Progress: New  Target Date: 12/16/24      Frequency of Treatment: 1x/week  Duration of Treatment: 8 weeks     Education Assessment: Learner/Method: Patient;Pictures/Video;Demonstration;Reading;Listening;Family  Education Comments: With assistance from in person      Risks and benefits of evaluation/treatment have been explained.   Patient/Family/caregiver agrees with Plan of Care.     Evaluation Time:    OT Eval, Low Complexity Minutes (21955): 20   Present: Yes: Language: Belgian    Signing Clinician: Mj Ken OT        [facility]                                                                                   OUTPATIENT OCCUPATIONAL  THERAPY      PLAN OF TREATMENT FOR OUTPATIENT REHABILITATION   Patient's Last Name, First Name, FATOUMATAAlfredMARIA TERESA FonsecaSampson QUACH YOB: 1985   Provider's Name   [facility]   Medical Record No.  [id number]     Onset Date: 10/17/24 (Referral) Start of Care Date: 10/21/24     Medical Diagnosis:  Pain of right thumb      OT Treatment Diagnosis:  Pain of right thumb Plan of Treatment  Frequency/Duration:1x/week/8 weeks    Certification date from 10/21/24   To 12/16/24        See note for plan of treatment details and functional goals     Mjguille Ken OT                         I CERTIFY THE NEED FOR THESE SERVICES FURNISHED UNDER        THIS PLAN OF TREATMENT AND WHILE UNDER MY CARE     (Physician attestation of this document indicates review and certification of the therapy plan).              Referring Provider:  Alvaro Jama    Initial Assessment  See Epic Evaluation- 10/21/24

## 2024-12-19 PROBLEM — M79.644 THUMB PAIN, RIGHT: Status: RESOLVED | Noted: 2024-10-21 | Resolved: 2024-12-19

## 2025-08-09 ENCOUNTER — HEALTH MAINTENANCE LETTER (OUTPATIENT)
Age: 40
End: 2025-08-09